# Patient Record
Sex: MALE | Race: WHITE | NOT HISPANIC OR LATINO | URBAN - METROPOLITAN AREA
[De-identification: names, ages, dates, MRNs, and addresses within clinical notes are randomized per-mention and may not be internally consistent; named-entity substitution may affect disease eponyms.]

---

## 2021-04-08 DIAGNOSIS — Z23 ENCOUNTER FOR IMMUNIZATION: ICD-10-CM

## 2021-04-21 ENCOUNTER — IMMUNIZATIONS (OUTPATIENT)
Dept: FAMILY MEDICINE CLINIC | Facility: HOSPITAL | Age: 56
End: 2021-04-21

## 2021-04-21 DIAGNOSIS — Z23 ENCOUNTER FOR IMMUNIZATION: Primary | ICD-10-CM

## 2021-04-21 PROCEDURE — 0001A SARS-COV-2 / COVID-19 MRNA VACCINE (PFIZER-BIONTECH) 30 MCG: CPT

## 2021-04-21 PROCEDURE — 91300 SARS-COV-2 / COVID-19 MRNA VACCINE (PFIZER-BIONTECH) 30 MCG: CPT

## 2021-05-17 ENCOUNTER — IMMUNIZATIONS (OUTPATIENT)
Dept: FAMILY MEDICINE CLINIC | Facility: HOSPITAL | Age: 56
End: 2021-05-17

## 2021-05-17 DIAGNOSIS — Z23 ENCOUNTER FOR IMMUNIZATION: Primary | ICD-10-CM

## 2021-05-17 PROCEDURE — 0002A SARS-COV-2 / COVID-19 MRNA VACCINE (PFIZER-BIONTECH) 30 MCG: CPT

## 2021-05-17 PROCEDURE — 91300 SARS-COV-2 / COVID-19 MRNA VACCINE (PFIZER-BIONTECH) 30 MCG: CPT

## 2022-09-12 ENCOUNTER — SOCIAL WORK (OUTPATIENT)
Dept: BEHAVIORAL/MENTAL HEALTH CLINIC | Facility: CLINIC | Age: 57
End: 2022-09-12
Payer: COMMERCIAL

## 2022-09-12 DIAGNOSIS — F25.1 SCHIZOAFFECTIVE DISORDER, DEPRESSIVE TYPE (HCC): Primary | ICD-10-CM

## 2022-09-12 DIAGNOSIS — F17.209 TOBACCO USE DISORDER, CONTINUOUS: ICD-10-CM

## 2022-09-12 PROCEDURE — 90834 PSYTX W PT 45 MINUTES: CPT | Performed by: COUNSELOR

## 2022-09-12 NOTE — PSYCH
Psychotherapy Provided: Individual Psychotherapy 45 minutes     Start time 11:10 am  End time:  11:57 am     Length of time in session: 47 minutes, follow up in 6 weeks    Encounter Diagnosis     ICD-10-CM    1  Sch[facility]oaffective disorder, depressive type ([facility] )  F25 1    2  Tobacco use disorder, continuous  F17 209         Goals addressed in session: Goal 1, Goal 2 and Goal 3      Pain:      none    0    Current suicide risk : Low       Data:   Discussed current events and concerns  Addressed associated feelings and issues  Reviewed progress on treatment goal(s)  Edwige Enter reports missing his mother who  a little over 2 years ago  Continue to process grief and loss, and worked on coping strategies  Ct  Reports not feeling important to anyone like hi was to his mother  Discussed limited social outlets - only family and neighbors  Taking some walks with his brother  Walking his dog, Lenell Cap  Session addressed above listed issues and concerns  Insights were shared and session worked to further develop coping skills  Considered progress on treatment goals  Obstacles to change were shared  Processed ideas on how to address same  Support given to client when needed  Noted areas of growth and healthy change  Assessment: Client's mood was depressed  Ct  Reports moderate level of depression, denies thoughts of self harm  Affect was normal range and intensity, appropriate  Tone was focused and engaged  Client denied current SI or HI  Stage of change is Maintenance  "I am pretty active in [facility]  Smoking 5 cigarellos daily "  Client continues to value counseling and treatment support  Psoraisis appears under control with no cracks in his hands or reportedly elsewhere  My brother's jairo has covid in our [facility] but is isolating  Clt and therapist wore masks during session  Plan:  Continue to practice coping strategies and healthy self-care    Identify where useful and where felt challenges in application  Client rescheduled for 6 weeks  Behavioral Health Treatment Plan [facility]: Diagnosis and Treatment Plan explained to David Campoverde relates understanding diagnosis and is agreeable to Treatment Plan   Yes

## 2022-10-06 ENCOUNTER — TELEPHONE (OUTPATIENT)
Dept: GASTROENTEROLOGY | Facility: CLINIC | Age: 57
End: 2022-10-06

## 2022-10-06 NOTE — TELEPHONE ENCOUNTER
10/06/22  Screened by: Jil Valencia    Referring Provider     Pre- Screening: There is no height or weight on file to calculate BMI  Has patient been referred for a routine screening Colonoscopy? yes  Is the patient between 39-70 years old? yes      Previous Colonoscopy yes   If yes:    Date: 2017 Dr Parkih Notus:     Reason:       Farshad Cha: If the patient is between 39yrs-47yrs, please advise patient to confirm benefits/coverage with their insurance company for a routine screening colonoscopy, some insurance carriers will only cover at [address] or older  If the patient is over 66years old, please schedule an office visit  Does the patient want to see a Gastroenterologist prior to their procedure OR are they having any GI symptoms? no    Has the patient been hospitalized or had abdominal surgery in the past 6 months? no    Does the patient use supplemental oxygen? no    Does the patient take Coumadin, Lovenox, Plavix, Elliquis, Xarelto, or other blood thinning medication? no    Has the patient had a stroke, cardiac event, or stent placed in the past year? no    SCHEDULING STAFF: If patient answers NO to above questions, then schedule procedure  If patient answers YES to above questions, then schedule office appointment  Patient passed OA please reach out to patient at [phone] recall Dr Moshe Johns    If patient is between 45yrs - 49yrs, please advise patient that we will have to confirm benefits & coverage with their insurance company for a routine screening colonoscopy

## 2022-10-10 NOTE — TELEPHONE ENCOUNTER
Left message for patient to call back  Last procedure was completed at [facility] on 5/1/18-4 year recall

## 2022-10-13 NOTE — TELEPHONE ENCOUNTER
Pt returning call and can be reached at [phone]  Pt states around noon is the best time to reach him

## 2022-10-14 NOTE — TELEPHONE ENCOUNTER
Patient has [facility] plan, told the patient we do not take that insurance, offered other MA products that we do take or if he wants to be self pay  He will get back to us

## 2022-10-24 ENCOUNTER — SOCIAL WORK (OUTPATIENT)
Dept: BEHAVIORAL/MENTAL HEALTH CLINIC | Facility: CLINIC | Age: 57
End: 2022-10-24

## 2022-10-24 DIAGNOSIS — F17.209 TOBACCO USE DISORDER, CONTINUOUS: ICD-10-CM

## 2022-10-24 DIAGNOSIS — F25.1 SCHIZOAFFECTIVE DISORDER, DEPRESSIVE TYPE (HCC): Primary | ICD-10-CM

## 2022-12-04 ENCOUNTER — HOSPITAL ENCOUNTER (EMERGENCY)
Facility: HOSPITAL | Age: 57
Discharge: HOME | End: 2022-12-04
Attending: EMERGENCY MEDICINE

## 2022-12-04 ENCOUNTER — APPOINTMENT (EMERGENCY)
Dept: RADIOLOGY | Facility: HOSPITAL | Age: 57
End: 2022-12-04

## 2022-12-04 VITALS
BODY MASS INDEX: 17.42 KG/M2 | SYSTOLIC BLOOD PRESSURE: 110 MMHG | RESPIRATION RATE: 25 BRPM | WEIGHT: 111 LBS | HEIGHT: 67 IN | HEART RATE: 92 BPM | DIASTOLIC BLOOD PRESSURE: 90 MMHG | TEMPERATURE: 97.5 F | OXYGEN SATURATION: 95 %

## 2022-12-04 DIAGNOSIS — J06.9 VIRAL URI WITH COUGH: Primary | ICD-10-CM

## 2022-12-04 DIAGNOSIS — J45.901 ASTHMA EXACERBATION: ICD-10-CM

## 2022-12-04 LAB
ALBUMIN SERPL BCP-MCNC: 3.6 G/DL (ref 3.5–5)
ALP SERPL-CCNC: 58 U/L (ref 46–116)
ALT SERPL W P-5'-P-CCNC: 49 U/L (ref 12–78)
ANION GAP SERPL CALCULATED.3IONS-SCNC: 4 MMOL/L (ref 4–13)
AST SERPL W P-5'-P-CCNC: 33 U/L (ref 5–45)
ATRIAL RATE: 72 BPM
BASOPHILS # BLD AUTO: 0.03 THOUSANDS/ÂΜL (ref 0–0.1)
BASOPHILS NFR BLD AUTO: 1 % (ref 0–1)
BILIRUB SERPL-MCNC: 0.4 MG/DL (ref 0.2–1)
BUN SERPL-MCNC: 11 MG/DL (ref 5–25)
CALCIUM SERPL-MCNC: 9.7 MG/DL (ref 8.3–10.1)
CARDIAC TROPONIN I PNL SERPL HS: 4 NG/L
CHLORIDE SERPL-SCNC: 102 MMOL/L (ref 96–108)
CO2 SERPL-SCNC: 31 MMOL/L (ref 21–32)
CREAT SERPL-MCNC: 0.79 MG/DL (ref 0.6–1.3)
EOSINOPHIL # BLD AUTO: 0.18 THOUSAND/ÂΜL (ref 0–0.61)
EOSINOPHIL NFR BLD AUTO: 3 % (ref 0–6)
ERYTHROCYTE [DISTWIDTH] IN BLOOD BY AUTOMATED COUNT: 12.2 % (ref 11.6–15.1)
FLUAV RNA RESP QL NAA+PROBE: NEGATIVE
FLUBV RNA RESP QL NAA+PROBE: NEGATIVE
GFR SERPL CREATININE-BSD FRML MDRD: 100 ML/MIN/1.73SQ M
GLUCOSE SERPL-MCNC: 102 MG/DL (ref 65–140)
HCT VFR BLD AUTO: 44.8 % (ref 36.5–49.3)
HGB BLD-MCNC: 15 G/DL (ref 12–17)
IMM GRANULOCYTES # BLD AUTO: 0.04 THOUSAND/UL (ref 0–0.2)
IMM GRANULOCYTES NFR BLD AUTO: 1 % (ref 0–2)
LYMPHOCYTES # BLD AUTO: 1.41 THOUSANDS/ÂΜL (ref 0.6–4.47)
LYMPHOCYTES NFR BLD AUTO: 22 % (ref 14–44)
MCH RBC QN AUTO: 31.3 PG (ref 26.8–34.3)
MCHC RBC AUTO-ENTMCNC: 33.5 G/DL (ref 31.4–37.4)
MCV RBC AUTO: 93 FL (ref 82–98)
MONOCYTES # BLD AUTO: 0.58 THOUSAND/ÂΜL (ref 0.17–1.22)
MONOCYTES NFR BLD AUTO: 9 % (ref 4–12)
NEUTROPHILS # BLD AUTO: 4.2 THOUSANDS/ÂΜL (ref 1.85–7.62)
NEUTS SEG NFR BLD AUTO: 64 % (ref 43–75)
NRBC BLD AUTO-RTO: 0 /100 WBCS
P AXIS: 73 DEGREES
PLATELET # BLD AUTO: 308 THOUSANDS/UL (ref 149–390)
PMV BLD AUTO: 9.2 FL (ref 8.9–12.7)
POTASSIUM SERPL-SCNC: 4.4 MMOL/L (ref 3.5–5.3)
PR INTERVAL: 154 MS
PROT SERPL-MCNC: 7.5 G/DL (ref 6.4–8.4)
QRS AXIS: 77 DEGREES
QRSD INTERVAL: 84 MS
QT INTERVAL: 388 MS
QTC INTERVAL: 424 MS
RBC # BLD AUTO: 4.8 MILLION/UL (ref 3.88–5.62)
RSV RNA RESP QL NAA+PROBE: NEGATIVE
SARS-COV-2 RNA RESP QL NAA+PROBE: NEGATIVE
SODIUM SERPL-SCNC: 137 MMOL/L (ref 135–147)
T WAVE AXIS: 75 DEGREES
VENTRICULAR RATE: 72 BPM
WBC # BLD AUTO: 6.44 THOUSAND/UL (ref 4.31–10.16)

## 2022-12-04 RX ORDER — METHYLPREDNISOLONE SODIUM SUCCINATE 125 MG/2ML
125 INJECTION, POWDER, LYOPHILIZED, FOR SOLUTION INTRAMUSCULAR; INTRAVENOUS ONCE
Status: COMPLETED | OUTPATIENT
Start: 2022-12-04 | End: 2022-12-04

## 2022-12-04 RX ORDER — PREDNISONE 20 MG/1
40 TABLET ORAL DAILY
Qty: 6 TABLET | Refills: 0 | Status: SHIPPED | OUTPATIENT
Start: 2022-12-04 | End: 2022-12-07

## 2022-12-04 RX ORDER — PREDNISONE 20 MG/1
20 TABLET ORAL DAILY
Qty: 15 TABLET | Refills: 0 | Status: CANCELLED | OUTPATIENT
Start: 2022-12-04

## 2022-12-04 RX ORDER — IPRATROPIUM BROMIDE AND ALBUTEROL SULFATE 2.5; .5 MG/3ML; MG/3ML
3 SOLUTION RESPIRATORY (INHALATION) ONCE
Status: COMPLETED | OUTPATIENT
Start: 2022-12-04 | End: 2022-12-04

## 2022-12-04 RX ADMIN — METHYLPREDNISOLONE SODIUM SUCCINATE 125 MG: 125 INJECTION, POWDER, FOR SOLUTION INTRAMUSCULAR; INTRAVENOUS at 10:49

## 2022-12-04 RX ADMIN — IPRATROPIUM BROMIDE AND ALBUTEROL SULFATE 3 ML: .5; 3 SOLUTION RESPIRATORY (INHALATION) at 09:56

## 2022-12-04 NOTE — ED PROVIDER NOTES
History  Chief Complaint   Patient presents with   • Shortness of Breath     Patient presents to the ER being SOB and tachypneic  Patient [facility] that [facility]      65year-old male presenting due to dyspnea and weakness  States his symptoms started around [facility] and have worsened  Mild dyspnea at rest worse with exertion generalized lethargy  Nonproductive cough that is increasing in frequency  Does admit to asthma history and states he takes his medication as prescribed but no more than usual   Mild improvement of dyspnea with his most recent albuterol use  Also complains of intermittent mild headache and muscle aches  Possible sick contacts at [facility] although no one else has persistent symptoms  Denies fever, chest pain or palpitations, hemoptysis, nausea vomiting diarrhea  None       Past Medical History:   Diagnosis Date   • Asthma    • Bipolar 1 disorder ([facility] )    • COPD (chronic obstructive pulmonary disease) ([facility] )    • Hypertension        History reviewed  No pertinent surgical history  History reviewed  No pertinent family history  I have reviewed and agree with the history as documented  E-Cigarette/Vaping   • E-Cigarette Use Never User      E-Cigarette/Vaping Substances     Social History     Tobacco Use   • Smoking status: Every Day     Packs/day: 0 25     Types: Cigarettes   • Smokeless tobacco: Never   Vaping Use   • Vaping Use: Never used   Substance Use Topics   • Alcohol use: Not Currently   • Drug use: Yes     Types: Marijuana       Review of Systems   Constitutional: Negative for fatigue and fever  HENT: Negative for congestion and trouble swallowing  Eyes: Negative for visual disturbance  Respiratory: Positive for shortness of breath  Negative for cough and chest tightness  Cardiovascular: Negative for chest pain  Gastrointestinal: Negative for abdominal pain, diarrhea, nausea and vomiting     Genitourinary: Negative for flank pain  Musculoskeletal: Negative for back pain and gait problem  Skin: Negative for rash and wound  Neurological: Positive for weakness  Negative for syncope and headaches  Psychiatric/Behavioral: Negative for agitation  All other systems reviewed and are negative  Physical Exam  Physical Exam  Vitals and nursing note reviewed  Constitutional:       Appearance: He is well-developed  He is not diaphoretic  HENT:      Head: Normocephalic and atraumatic  Right Ear: External ear normal       Left Ear: External ear normal    Eyes:      General:         Right eye: No discharge  Left eye: No discharge  Conjunctiva/sclera: Conjunctivae normal    Neck:      Vascular: No JVD  Trachea: No tracheal deviation  Cardiovascular:      Rate and Rhythm: Normal rate and regular rhythm  Heart sounds: Normal heart sounds  Pulmonary:      Effort: Pulmonary effort is normal       Breath sounds: Wheezing present  Abdominal:      General: There is no distension  Musculoskeletal:         General: Normal range of motion  Cervical back: Normal range of motion  Skin:     General: Skin is warm and dry  Neurological:      Mental Status: He is alert and oriented to person, place, and time     Psychiatric:         Mood and Affect: Mood normal          Speech: Speech normal          Behavior: Behavior normal          Vital Signs  ED Triage Vitals [12/04/22 0931]   Temperature Pulse Respirations Blood Pressure SpO2   97 5 °F (36 4 °C) 92 (!) 25 110/90 95 %      Temp Source Heart Rate Source Patient Position - Orthostatic VS BP Location FiO2 (%)   Oral Monitor Lying Left arm --      Pain Score       No Pain           Vitals:    12/04/22 0931   BP: 110/90   Pulse: 92   Patient Position - Orthostatic VS: Lying         Visual Acuity      ED Medications  Medications   ipratropium-albuterol (DUO-NEB) 0 5-2 5 mg/3 mL inhalation solution 3 mL (3 mL Nebulization Given 12/4/22 1727)   methylPREDNISolone sodium succinate (Solu-MEDROL) injection 125 mg (125 mg Intravenous Given 12/4/22 1049)       Diagnostic Studies  Results Reviewed     Procedure Component Value Units Date/Time    FLU/RSV/COVID - if FLU/RSV clinically relevant [[id number]]  (Normal) Collected: 12/04/22 0945    Lab Status: Final result Specimen: Nares from Nose Updated: 12/04/22 1030     SARS-CoV-2 Negative     INFLUENZA A PCR Negative     INFLUENZA B PCR Negative     RSV PCR Negative    Narrative:      FOR PEDIATRIC PATIENTS - copy/paste COVID Guidelines URL to browser: [email or web address]  [employer]    SARS-CoV-2 assay is a Nucleic Acid Amplification assay intended for the  qualitative detection of nucleic acid from SARS-CoV-2 in nasopharyngeal  swabs  Results are for the presumptive identification of SARS-CoV-2 RNA  Positive results are indicative of infection with SARS-CoV-2, the virus  causing COVID-19, but do not rule out bacterial infection or co-infection  with other viruses  Laboratories within the [place] and its  territories are required to report all positive results to the appropriate  public health authorities  Negative results do not preclude SARS-CoV-2  infection and should not be used as the sole basis for treatment or other  patient management decisions  Negative results must be combined with  clinical observations, patient history, and epidemiological information  This test has not been FDA cleared or approved  This test has been authorized by FDA under an Emergency Use Authorization  (EUA)  This test is only authorized for the duration of time the  declaration that circumstances exist justifying the authorization of the  emergency use of an in vitro diagnostic tests for detection of SARS-CoV-2  virus and/or diagnosis of COVID-19 infection under section 564(b)(1) of  the Act, 21 U  S C  604MII-1(Y)(5), unless the authorization is terminated  or revoked sooner  The test has been validated but independent review by FDA  and CLIA is pending  Test performed using [employer] GeneXpert: This RT-PCR assay targets N2,  a region unique to SARS-CoV-2  A conserved region in the E-gene was chosen  for pan-Sarbecovirus detection which includes SARS-CoV-2  According to CMS-2020-01-R, this platform meets the definition of high-throughput technology      HS Troponin 0hr (reflex protocol) [[id number]]  (Normal) Collected: 12/04/22 0945    Lab Status: Final result Specimen: Blood from Arm, Right Updated: 12/04/22 1014     hs TnI 0hr 4 ng/L     Comprehensive metabolic panel [[id number]] Collected: 12/04/22 0945    Lab Status: Final result Specimen: Blood from Arm, Right Updated: 12/04/22 1007     Sodium 137 mmol/L      Potassium 4 4 mmol/L      Chloride 102 mmol/L      CO2 31 mmol/L      ANION GAP 4 mmol/L      BUN 11 mg/dL      Creatinine 0 79 mg/dL      Glucose 102 mg/dL      Calcium 9 7 mg/dL      AST 33 U/L      ALT 49 U/L      Alkaline Phosphatase 58 U/L      Total Protein 7 5 g/dL      Albumin 3 6 g/dL      Total Bilirubin 0 40 mg/dL      eGFR 100 ml/min/1 73sq m     Narrative:      [place] guidelines for Chronic Kidney Disease (CKD):   •  Stage 1 with normal or high GFR (GFR > 90 mL/min/1 73 square meters)  •  Stage 2 Mild CKD (GFR = 60-89 mL/min/1 73 square meters)  •  Stage 3A Moderate CKD (GFR = 45-59 mL/min/1 73 square meters)  •  Stage 3B Moderate CKD (GFR = 30-44 mL/min/1 73 square meters)  •  Stage 4 Severe CKD (GFR = 15-29 mL/min/1 73 square meters)  •  Stage 5 End Stage CKD (GFR <15 mL/min/1 73 square meters)  Note: GFR calculation is accurate only with a steady state creatinine    CBC and differential [[id number]] Collected: 12/04/22 0945    Lab Status: Final result Specimen: Blood from Arm, Right Updated: 12/04/22 0952     WBC 6 44 Thousand/uL      RBC 4 80 Million/uL      Hemoglobin 15 0 g/dL      Hematocrit 44 8 %      MCV 93 fL MCH 31 3 pg      MCHC 33 5 g/dL      RDW 12 2 %      MPV 9 2 fL      Platelets 430 Thousands/uL      nRBC 0 /100 WBCs      Neutrophils Relative 64 %      Immat GRANS % 1 %      Lymphocytes Relative 22 %      Monocytes Relative 9 %      Eosinophils Relative 3 %      Basophils Relative 1 %      Neutrophils Absolute 4 20 Thousands/µL      Immature Grans Absolute 0 04 Thousand/uL      Lymphocytes Absolute 1 41 Thousands/µL      Monocytes Absolute 0 58 Thousand/µL      Eosinophils Absolute 0 18 Thousand/µL      Basophils Absolute 0 03 Thousands/µL                  XR chest 2 views   ED Interpretation by Elie Amezcua DO (12/04 1023)   No acute cardiopulmonary disease      Final Result by Yaneth Rhoades MD (12/04 1241)      No acute cardiopulmonary disease  Workstation performed: [id number]                    Procedures  Procedures         ED Course  ED Course as of 12/04/22 1514   Sun Dec 04, 2022   2541 This EKG was interpreted by me  The EKG demonstrates Normal sinus rhythm, normal intervals and axis, normal QRS, non specific acute ST-T changes                                              MDM  Number of Diagnoses or Management Options  Asthma exacerbation  Viral URI with cough  Diagnosis management comments: Negative viral panel no acute findings an ACS workup per chest x-ray  Suspect viral etiology this trigger for asthma exacerbation  Short course of prednisone sent to pharmacy  Provided contact information for pulmonology    Discharged in stable condition with return precautions      Disposition  Final diagnoses:   Viral URI with cough   Asthma exacerbation     Time reflects when diagnosis was documented in both MDM as applicable and the Disposition within this note     Time User Action Codes Description Comment    12/4/2022 10:32 AM Bryon Allison Add [J06 9] Viral URI with cough     12/4/2022 10:32 AM Bryon Allison Add [J45 901] Asthma exacerbation       ED Disposition     ED Disposition   Discharge    Condition   Stable    Date/Time   Sun Dec 4, 2022 10:53 AM    Comment   Brian Gonzalez discharge to home/self care  Follow-up Information     Follow up With Specialties Details Why Contact Info Additional [address]   [address], [address] [facility] [address]  [address], [address], [address] [place], [place], [address]    [facility]   [address]  [facility] [address] [id number]  [phone] [facility] Pul[facility] [phone], [address], [facility] [place], [place], [place], [address]          Discharge Medication List as of 12/4/2022 10:53 AM      START taking these medications    Details   predniSONE 20 mg tablet Take 2 tablets (40 mg total) by mouth daily for 3 days, Starting Sun 12/4/2022, Until Wed 12/7/2022, Normal             No discharge procedures on file      PDMP Review     None          ED Provider  Electronically Signed by           Che Rojas DO  12/04/22 2768

## 2022-12-04 NOTE — DISCHARGE INSTRUCTIONS
Medication was sent to your pharmacy  Please take as prescribed  Follow up with your family doctor  Contact information has been provided for pulmonology

## 2022-12-09 NOTE — PSYCH
[facility]/Hospital: [address]   [address] [facility]    Psychiatric Progress Note  MRN#: [id number]  Shameka Sifuentes 65 y o  male    This note was not shared with the patient due to reasonable likelihood of causing patient harm   This Patient was seen in the office today at [place] [address] location  This is a Transfer Patient of ASHLEY Bravo-don't know how to complete virtual session    Subjective:  Hannah Voss reported he's living with brother and that the  mom  2 yrs ago  Sometime he feels depressed due to " hearing the brother and brother's girlfriend having arguments    Hannah Voss stated "feeling stuck in the middle" and discomfort  External Records were reviewed- there's diagnoses of schizoaffective disorder listed  Today, he was unsure of present diagnosed , later reported at as schizophrenia  Stated he would see things while in his late 19's , early 29's like "shadows" at the time he was smoking marijuana  He stated mood currently as " slightly depressed since brothers girlfriend got at job in August -September  Reported responsibility of having to cook every night and high amount of work, some worries of what to cook  Later stated worries about pet and something happening;  then he had to cook every night , described as a lot of work  ROS:  Depression- yes ( fears he'll hurt himself), last occurred 1 yr ago)  SI/HI  No   Anxiety- frustrations, defer to HPI for details  Sleep- sometimes difficults falling a sleep  Psychosis-  sometimes hears some calling Hannah Voss - last occurred ~1 month ago, and thoughts of people talking about him; Also was hearing voices telling him to hurt himself when mom was sick       Medication compliance: Yes    Medication side effects:none    Substance Hx  Illicit: occasionally smoke marijuana- 1wkly ( < 1 bowel- not laced with anything)- denies immediated side effect  ETOH- " once a while"- once every couple months- denies black outs, DUI ,seizures   Tobacco hx: he smokes cigers - 5 per day , he's smoked for 18yrs old       Psychiatric Hx  Hospitalization : early 200'0's- twice cut off hair intentional , also wanted to hurt himself  A t the time mom call 911, and Bill then signed 201  Stated he was then diagnosised with schizophrenia- was seeing stuff and hearing voices      Allergies: Oxcarbazepine - SJS have 4-5 yrs ( ascending rashing from legs to hand, swollen throat, dizziness) then was taken to [facility]- then was stopped  Current Outpatient Medications:   •  albuterol (PROVENTIL HFA,VENTOLIN HFA) 90 mcg/act inhaler, Inhale 2 puffs daily as needed every 4 to 6 hours as needed for wheezing, Disp: , Rfl:   •  alendronate (FOSAMAX) 70 mg tablet, Take 70 mg by mouth once a week, Disp: , Rfl:   •  amitriptyline (ELAVIL) 50 mg tablet, Amitriptyline 50m tablet po at night PRN, Disp: 90 tablet, Rfl: 0  •  DULoxetine (CYMBALTA) 60 mg delayed release capsule, Duloxetine 60m capsule po in the morning, Disp: 90 capsule, Rfl: 0  •  loratadine (CLARITIN) 10 mg tablet, Take 10 mg by mouth daily as needed, Disp: , Rfl:   •  risperiDONE (RisperDAL) 4 mg tablet, Risperidone 4m tablet po night, Disp: 90 tablet, Rfl: 0  •  Atrovent HFA 17 MCG/ACT inhaler, Inhale 2 puffs 3 (three) times a day, Disp: , Rfl:   •  fluticasone (FLONASE) 50 mcg/act nasal spray, 2 sprays, Disp: , Rfl:     FHX:  Brother - depression      Social Hx  Educational- highest level of educations 12th grade- diploma  Learning Disorder- "slow learner" - was in special classes  Marital Status:  since   Live in [address] [place] [address]   with brother, brother girlfriend and her son  Unemployed    Per External Records: Medical Hx:  Psoriasis  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      Mental Status Evaluation:  General Appearance:  Faith Bragg is a 65 y o    male casually dressed, adequate hygiene and grooming, looks stated age , Wearing glasses   Behavior:  cooperative, intermittent eye contact, mildly anxious   Speech:  normal for rate, rhythm, volume and amount with increased latency slightly, also monotonic prosody    Mood:  depressed and frustration   Affect:  mood-congruent   Thought Process:  concrete, disorganized and goal directed concrete thinking slightly    Thought Content:  no overt delusions, normal   Perceptual Disturbances: no auditory hallucinations, no visual hallucinations  Delusions  No   Risk Potential: Suicidal Ideations No  Homicidal Ideations No  Potential for Aggression No     Sensorium:  Oriented to person, place, time/date and situation   Memory:  recent and remote memory grossly intact   Consciousness:  alert and awake   Attention: attention span and concentration are age appropriate   Insight:  fair to limited   Judgment: fair   Gait/Station: normal   Motor Activity: no abnormal movements     There were no vitals filed for this visit  Medications:   No current outpatient medications on file prior to visit  No current facility-administered medications on file prior to visit  Labs: I have personally reviewed all pertinent laboratory/tests results     Admission Labs:   CBC:   Lab Results   Component Value Date    WBC 6 44 12/04/2022    RBC 4 80 12/04/2022    HGB 15 0 12/04/2022    HCT 44 8 12/04/2022    MCV 93 12/04/2022     12/04/2022    MCH 31 3 12/04/2022    MCHC 33 5 12/04/2022    RDW 12 2 12/04/2022    MPV 9 2 12/04/2022    NEUTROABS 4 20 12/04/2022     CMP:   Lab Results   Component Value Date    SODIUM 137 12/04/2022    K 4 4 12/04/2022     12/04/2022    CO2 31 12/04/2022    AGAP 4 12/04/2022    BUN 11 12/04/2022    CREATININE 0 79 12/04/2022    GLUC 102 12/04/2022    CALCIUM 9 7 12/04/2022    AST 33 12/04/2022    ALT 49 12/04/2022    ALKPHOS 58 12/04/2022    TP 7 5 12/04/2022    ALB 3 6 12/04/2022    TBILI 0 40 12/04/2022 EGFR 100 2022     Lipid Profile: No results found for: CHOLESTEROL, HDL, TRIG, LDLCALC, NONHDLC  Thyroid Studies: No results found for: Ryan Gardner, [id number]  EKG   Lab Results   Component Value Date    VENTRATE 72 2022    ATRIALRATE 72 2022    PRINT 154 2022    QRSDINT 84 2022    QTINT 388 2022    QTCINT 424 2022    PAXIS 73 2022    QRSAXIS 77 2022    TWAVEAXIS 75 2022       ________________________________________________________________________    A/P  Bismark Voss is a 60-year-old  male, history of hospitalization for schizophrenia (auditory hallucinations and suicidality bizarre behaviors), today presented with complaints of depression and frustration and intermittent auditory hallucinations  Case complicated; as there was concrete thinking, and disorganization with thoughts  He presented on risperidone and duloxetine and amitriptyline, without side effects      DSM5  Schizophrenia  Unspecified Anxiety      PLAN:   Discussed diagnostic impression and plan based on clinical findings, he was informed of diagnoses of schizophrenia and anxiety  Duloxetine was increased to 60 mg +30mg  Continue other medications as prescribed  Orders Placed This Encounter   Procedures   • Lipid Panel with Direct LDL reflex   • TSH, 3rd generation with Free T4 reflex     Ordered Medications During Encounter   -     risperiDONE (RisperDAL) 4 mg tablet; Risperidone 4m tablet po night  -     DULoxetine (Cymbalta) 30 mg delayed release capsule; Duloxetine 30m capsule in the afternoon,  60mg capsule in the morning  -     DULoxetine (CYMBALTA) 60 mg delayed release capsule; Duloxetine 60m capsule po in the morning  -     amitriptyline (ELAVIL) 50 mg tablet; Amitriptyline 50m tablet po at night PRN          Risks, benefits, and possible side effects of medications explained to patient and patient verbalizes understanding  Next Appointment: 6-8 wks      Today's Appointment@ [facility]  Face To Face:  11:06 AM -12:03 PM;Documentation Time:  1:55 PM- 2:26 PM    Encounter Duration: Time Spent 63 minutes with Patient  Greater than 50% of total time was spent with the patient     MDM  Number of Diagnoses or Management Options  Anxiety: new, needed workup  Schizophrenia: established, improving     Amount and/or Complexity of Data Reviewed  Clinical lab tests: reviewed and ordered  Decide to obtain previous medical records or to obtain history from someone other than the patient: yes  Review and summarize past medical records: yes    Risk of Complications, Morbidity, and/or Mortality  Presenting problems: moderate  Management options: high    Critical Care  Total time providing critical care: 30-74 minutes

## 2022-12-09 NOTE — PATIENT INSTRUCTIONS
Jack Ashford [id number]   Thanks for presenting to today's Appointment at [facility]  Your medications were not changed, although Duloxetine was increased to 60mg in the morning and 30 mg in the afternoon  Complete labs-  orders weas sent to [facility]

## 2022-12-12 ENCOUNTER — OFFICE VISIT (OUTPATIENT)
Dept: PSYCHIATRY | Facility: CLINIC | Age: 57
End: 2022-12-12

## 2022-12-12 DIAGNOSIS — Z79.899 ENCOUNTER FOR LONG-TERM (CURRENT) USE OF OTHER MEDICATIONS: ICD-10-CM

## 2022-12-12 DIAGNOSIS — F41.9 ANXIETY: ICD-10-CM

## 2022-12-12 DIAGNOSIS — F20.9 SCHIZOPHRENIA, UNSPECIFIED TYPE (HCC): Primary | ICD-10-CM

## 2022-12-12 RX ORDER — RISPERIDONE 4 MG/1
TABLET ORAL
Qty: 90 TABLET | Refills: 0 | Status: SHIPPED | OUTPATIENT
Start: 2022-12-12

## 2022-12-12 RX ORDER — IPRATROPIUM BROMIDE 17 UG/1
2 AEROSOL, METERED RESPIRATORY (INHALATION) 3 TIMES DAILY
COMMUNITY
Start: 2022-11-30

## 2022-12-12 RX ORDER — DULOXETIN HYDROCHLORIDE 30 MG/1
CAPSULE, DELAYED RELEASE ORAL
Qty: 30 CAPSULE | Refills: 1 | Status: SHIPPED | OUTPATIENT
Start: 2022-12-12

## 2022-12-12 RX ORDER — ALBUTEROL SULFATE 90 UG/1
2 AEROSOL, METERED RESPIRATORY (INHALATION) DAILY PRN
COMMUNITY
Start: 2022-12-01

## 2022-12-12 RX ORDER — RISPERIDONE 4 MG/1
4 TABLET ORAL
COMMUNITY
End: 2022-12-12 | Stop reason: SDUPTHER

## 2022-12-12 RX ORDER — LORATADINE 10 MG/1
10 TABLET ORAL DAILY PRN
COMMUNITY
Start: 2022-12-01

## 2022-12-12 RX ORDER — AMITRIPTYLINE HYDROCHLORIDE 50 MG/1
TABLET, FILM COATED ORAL
Qty: 90 TABLET | Refills: 0 | Status: SHIPPED | OUTPATIENT
Start: 2022-12-12

## 2022-12-12 RX ORDER — DULOXETIN HYDROCHLORIDE 60 MG/1
60 CAPSULE, DELAYED RELEASE ORAL DAILY
COMMUNITY
End: 2022-12-12 | Stop reason: SDUPTHER

## 2022-12-12 RX ORDER — DULOXETIN HYDROCHLORIDE 60 MG/1
CAPSULE, DELAYED RELEASE ORAL
Qty: 90 CAPSULE | Refills: 0 | Status: SHIPPED | OUTPATIENT
Start: 2022-12-12

## 2022-12-12 RX ORDER — FLUTICASONE PROPIONATE 50 MCG
2 SPRAY, SUSPENSION (ML) NASAL
COMMUNITY
Start: 2022-12-01

## 2022-12-12 RX ORDER — ALENDRONATE SODIUM 70 MG/1
70 TABLET ORAL WEEKLY
COMMUNITY
Start: 2022-10-20

## 2022-12-12 RX ORDER — AMITRIPTYLINE HYDROCHLORIDE 50 MG/1
50 TABLET, FILM COATED ORAL DAILY PRN
COMMUNITY
Start: 2022-11-29 | End: 2022-12-12 | Stop reason: SDUPTHER

## 2022-12-16 ENCOUNTER — TELEPHONE (OUTPATIENT)
Dept: PSYCHIATRY | Facility: CLINIC | Age: 57
End: 2022-12-16

## 2022-12-16 LAB
CHOLEST SERPL-MCNC: 170 MG/DL (ref 100–199)
HDLC SERPL-MCNC: 55 MG/DL
LDLC SERPL CALC-MCNC: 101 MG/DL (ref 0–99)
LDLC/HDLC SERPL: 1.8 RATIO (ref 0–3.6)
SL AMB VLDL CHOLESTEROL CALC: 14 MG/DL (ref 5–40)
TRIGL SERPL-MCNC: 71 MG/DL (ref 0–149)
TSH SERPL DL<=0.005 MIU/L-ACNC: 4.12 UIU/ML (ref 0.45–4.5)

## 2022-12-16 NOTE — TELEPHONE ENCOUNTER
Pt Yaneth SORENSON 1965 chart was reviewed , h/o schizophrenia  Recent labs were completed  WNL lipid and TSH  Spoke with Britta Ziegler, he was informed of results         This note was not shared with the patient due to reasonable likelihood of causing patient harm

## 2023-01-23 ENCOUNTER — SOCIAL WORK (OUTPATIENT)
Dept: BEHAVIORAL/MENTAL HEALTH CLINIC | Facility: CLINIC | Age: 58
End: 2023-01-23

## 2023-01-23 DIAGNOSIS — F17.209 TOBACCO USE DISORDER, CONTINUOUS: ICD-10-CM

## 2023-01-23 DIAGNOSIS — F25.1 SCHIZOAFFECTIVE DISORDER, DEPRESSIVE TYPE (HCC): Primary | ICD-10-CM

## 2023-01-23 NOTE — BH TREATMENT PLAN
Jalen Chambers  1965       Date of Initial Treatment Plan: see Credible for past plans   Date of Current Treatment Plan: 01/23/23    Treatment Plan Number 1 in Epic     Strengths/Personal Resources for Self Care: Cooking, desire to be healthy, caring of loved ones, dedicated to family, pitch in and help when needed  Diagnosis:   1  Schizoaffective disorder, depressive type ([address] )        2  Tobacco use disorder, continuous            Area of Needs: maintenance support to deal effectively with MH, Outlet to vent and manage life stress, Continue to build confidence and manage life stress  Long Term Goal 1: Maintenance support to keep mental health stable in face of life stress  Target Date: 1/23/23  Completion Date: TBD         Short Term Objectives for Goal 1: Maintenance support to keep mental health stable in face of life stress  Long Term Goal 2:  Outlet to vent and manage life stress, Continue to build confidence and manage life stress  Target Date: 1/23/23  Completion Date: TBD    Short Term Objectives for Goal 2:  Outlet to vent and manage life stress, Continue to build confidence and manage life stress  Long Term Goal # 3: N/A     Target Date: N/A  Completion Date:     Short Term Objectives for Goal 3: N/A    GOAL 1: Modality: Individual 1 x q 6 weeksx per month   Completion Date TBD    GOAL 2: Modality: Individual 1x every 6 weeksx per month   Completion Date TBD and Group     GOAL 3: Modality: Individual N/Ax per month   Completion Date N/A      Behavioral Health Treatment Plan [facility]: Diagnosis and Treatment Plan explained to Roxie Chilel relates understanding diagnosis and is agreeable to Treatment Plan  Client Comments : Please share your thoughts, feelings, need and/or experiences regarding your treatment plan: Idania Centeno agreed to above listed tx plan

## 2023-01-23 NOTE — PSYCH
Behavioral Health Psychotherapy Progress Note    Psychotherapy Provided: Individual Psychotherapy     1  Schizoaffective disorder, depressive type ([facility] [address] )        2  Tobacco use disorder, continuous            Goals addressed in session: Goal 1, Goal 2 and Goal 3      DATA:     Got through xmas okay, grief was manageable  Had colonoscopy, awaiting results  One polyp removed  Psoriasis manageable  Gets injectable med 1x q 3 months  Dry hands but not cracking  During this session, this clinician used the following therapeutic modalities: Cognitive Behavioral Therapy, Mindfulness-based Strategies and Supportive Psychotherapy    Substance Abuse was addressed during this session  If the client is diagnosed with a co-occurring substance use disorder, please indicate any changes in the frequency or amount of use: Bill smoking natural cigars, about 5 daily    Stage of change for addressing substance use diagnoses: Contemplation    ASSESSMENT:  Farzaneh Coates presents with a Euthymic/ normal mood  his affect is Normal range and intensity, which is congruent, with his mood and the content of the session  The client has made progress on their goals  Farzaneh Coates presents with a none risk of suicide, none risk of self-harm, and none risk of harm to others  For any risk assessment that surpasses a "low" rating, a safety plan must be developed  A safety plan was indicated: yes  If yes, describe in detail     PLAN: Between sessions, Farzaneh Coates will work on above listed goals and tx goals    At the next session, the therapist will use Cognitive Behavioral Therapy, Mindfulness-based Strategies and Supportive Psychotherapy to address tx goals       Behavioral Health Treatment Plan and Discharge Planning: Farzaneh Coates is aware of and agrees to continue to work on their treatment plan  They have identified and are working toward their discharge goals   yes    Visit start and stop times:    01/23/23  Start Time: 1103  Stop Time: 1155  Total Visit Time: 52 minutes

## 2023-01-27 NOTE — PATIENT INSTRUCTIONS
Helga Hale [id number]   Thanks for presenting to today's Appointment at [facility]   Your medications were not changed

## 2023-01-27 NOTE — PSYCH
[facility]: [address] [facility]   [address] [facility]    Psychiatric Progress Note  MRN#: [id number]  Mike Rodriguez 65 y o  male    This note was not shared with the patient due to reasonable likelihood of causing patient harm   This Patient was seen in the office today at [place] [address] location  Subjective:  Shelli Rohith  - there's slight worries about his brother- who hit his head  Otherwise Shelli Rohith , reported increasing  Duloxetine 90mg  - now  more calm since higher dose  Denies side effects  No thoughts SI , HI  Sleep is stable  Mental Status Evaluation:  General Appearance:  Mike Rodriguez is a 65 y o   male casually dressed, adequate hygiene and grooming, looks stated age , Wearing glasses   Behavior:  pleasant, cooperative, intermittent eye contact   Speech:  normal for rate, rhythm, volume, latency, amount slightly, also monotonic prosody    Mood:  normal   Affect:  normal   Thought Process:  normal and logical    Thought Content:  no overt delusions, normal   Perceptual Disturbances: no auditory hallucinations, no visual hallucinations  Does not appear responding or preoccupied  Delusions  No   Risk Potential: Suicidal Ideations No  Homicidal Ideations No  Potential for Aggression No     Sensorium:  Oriented to person, place, time/date and situation   Memory:  recent and remote memory grossly intact   Consciousness:  alert and awake   Attention: attention span and concentration are age appropriate   Insight:  fair    Judgment: fair   Gait/Station: normal   Motor Activity: no abnormal movements     There were no vitals filed for this visit       Medications:   Current Outpatient Medications on File Prior to Visit   Medication Sig Dispense Refill   • albuterol (PROVENTIL HFA,VENTOLIN HFA) 90 mcg/act inhaler Inhale 2 puffs daily as needed every 4 to 6 hours as needed for wheezing • alendronate (FOSAMAX) 70 mg tablet Take 70 mg by mouth once a week     • amitriptyline (ELAVIL) 50 mg tablet Amitriptyline 50m tablet po at night PRN 90 tablet 0   • Atrovent HFA 17 MCG/ACT inhaler Inhale 2 puffs 3 (three) times a day     • DULoxetine (Cymbalta) 30 mg delayed release capsule Duloxetine 30m capsule in the afternoon,  60mg capsule in the morning 30 capsule 1   • DULoxetine (CYMBALTA) 60 mg delayed release capsule Duloxetine 60m capsule po in the morning 90 capsule 0   • fluticasone (FLONASE) 50 mcg/act nasal spray 2 sprays     • loratadine (CLARITIN) 10 mg tablet Take 10 mg by mouth daily as needed     • risperiDONE (RisperDAL) 4 mg tablet Risperidone 4m tablet po night 90 tablet 0     No current facility-administered medications on file prior to visit  Labs: I have personally reviewed all pertinent laboratory/tests results     Admission Labs:   CBC:   Lab Results   Component Value Date    WBC 6 44 2022    RBC 4 80 2022    HGB 15 0 2022    HCT 44 8 2022    MCV 93 2022     2022    MCH 31 3 2022    MCHC 33 5 2022    RDW 12 2 2022    MPV 9 2 2022    NEUTROABS 4 20 2022     CMP:   Lab Results   Component Value Date    SODIUM 137 2022    K 4 4 2022     2022    CO2 31 2022    AGAP 4 2022    BUN 11 2022    CREATININE 0 79 2022    GLUC 102 2022    CALCIUM 9 7 2022    AST 33 2022    ALT 49 2022    ALKPHOS 58 2022    TP 7 5 2022    ALB 3 6 2022    TBILI 0 40 2022    EGFR 100 2022     Lipid Profile:   Lab Results   Component Value Date    CHOLESTEROL 170 12/15/2022    HDL 55 12/15/2022    TRIG 71 12/15/2022    LDLCALC 101 (H) 12/15/2022     Thyroid Studies: No results found for: GDG9MRWHKPYS, T3FREE, FREET4, U4DLTJO, X0XNNMN  EKG   Lab Results   Component Value Date    VENTRATE 72 2022    ATRIALRATE 72 2022    PRINT 154 2022    QRSDINT 84 2022    QTINT 388 2022    QTCINT 424 2022    PAXIS 73 2022    QRSAXIS 77 2022    TWAVEAXIS 75 2022       ________________________________________________________________________    A/P  Ketan Varner,  71-year-old  male, history of hospitalization for schizophrenia (auditory hallucinations and suicidality bizarre behaviors)  Interim events of  depression ,  frustration and intermittent auditory hallucinations  Today ,slight worries otherwise without depression since increase dose of Duloxetine  ~ 6 wks ago  DSM5  Schizophrenia  Unspecified Anxiety      PLAN:   Discussed diagnostic impression and plan based on clinical findings, he was informed of diagnoses of schizophrenia and anxiety      Medications Prescribed During Encounter   -     risperiDONE (RisperDAL) 4 mg tablet; Risperidone 4m tablet po night, 90qty  -     DULoxetine (Cymbalta) 30 mg delayed release capsule; Duloxetine 30m capsule in the afternoon,  60mg capsule in the morning, 90qty  -     DULoxetine (CYMBALTA) 60 mg delayed release capsule; Duloxetine 60m capsule po in the morning, 30mg capsule in the afternoon, 90qty  -     amitriptyline (ELAVIL) 50 mg tablet; Amitriptyline 50m tablet po at night PRN, 90qty               Treatement: Risks, benefits, and possible side effects of medications explained to patient and patient verbalizes understanding  Next Appointment at [facility] [address]  4 months                     [facility]                    Face To Face:  11:45 AM -11:54 AM;Documentation Time:  4:14 PM- 4:25 PM                     Encounter Duration: Time Spent 11 minutes with Patient  Greater than 50% of total time was spent with the patient               MDM  Number of Diagnoses or Management Options  Anxiety: established, improving  Schizophrenia: established, improving     Amount and/or Complexity of Data Reviewed  Review and summarize past medical records: yes    Risk of Complications, Morbidity, and/or Mortality  Presenting problems: low  Management options: low

## 2023-01-31 ENCOUNTER — OFFICE VISIT (OUTPATIENT)
Dept: PSYCHIATRY | Facility: CLINIC | Age: 58
End: 2023-01-31

## 2023-01-31 DIAGNOSIS — F41.9 ANXIETY: ICD-10-CM

## 2023-01-31 DIAGNOSIS — F20.9 SCHIZOPHRENIA, UNSPECIFIED TYPE (HCC): ICD-10-CM

## 2023-01-31 RX ORDER — DULOXETIN HYDROCHLORIDE 60 MG/1
CAPSULE, DELAYED RELEASE ORAL
Qty: 90 CAPSULE | Refills: 0 | Status: SHIPPED | OUTPATIENT
Start: 2023-01-31

## 2023-01-31 RX ORDER — AMITRIPTYLINE HYDROCHLORIDE 50 MG/1
TABLET, FILM COATED ORAL
Qty: 90 TABLET | Refills: 0 | Status: SHIPPED | OUTPATIENT
Start: 2023-01-31

## 2023-01-31 RX ORDER — DULOXETIN HYDROCHLORIDE 30 MG/1
CAPSULE, DELAYED RELEASE ORAL
Qty: 90 CAPSULE | Refills: 0 | Status: SHIPPED | OUTPATIENT
Start: 2023-01-31 | End: 2023-01-31 | Stop reason: SDUPTHER

## 2023-01-31 RX ORDER — DULOXETIN HYDROCHLORIDE 30 MG/1
CAPSULE, DELAYED RELEASE ORAL
Qty: 90 CAPSULE | Refills: 0 | Status: SHIPPED | OUTPATIENT
Start: 2023-01-31

## 2023-01-31 RX ORDER — RISPERIDONE 4 MG/1
TABLET ORAL
Qty: 90 TABLET | Refills: 0 | Status: SHIPPED | OUTPATIENT
Start: 2023-01-31

## 2023-01-31 RX ORDER — DULOXETIN HYDROCHLORIDE 60 MG/1
CAPSULE, DELAYED RELEASE ORAL
Qty: 90 CAPSULE | Refills: 0 | Status: SHIPPED | OUTPATIENT
Start: 2023-01-31 | End: 2023-01-31 | Stop reason: SDUPTHER

## 2023-03-06 ENCOUNTER — SOCIAL WORK (OUTPATIENT)
Dept: BEHAVIORAL/MENTAL HEALTH CLINIC | Facility: CLINIC | Age: 58
End: 2023-03-06

## 2023-03-06 DIAGNOSIS — F25.1 SCHIZOAFFECTIVE DISORDER, DEPRESSIVE TYPE (HCC): Primary | ICD-10-CM

## 2023-03-06 DIAGNOSIS — F17.209 TOBACCO USE DISORDER, CONTINUOUS: ICD-10-CM

## 2023-03-22 NOTE — PSYCH
Behavioral Health Psychotherapy Progress Note    Psychotherapy Provided: Individual Psychotherapy     1  Schizoaffective disorder, depressive type ([address] )        2  Tobacco use disorder, continuous            Goals addressed in session: Goal 1 and Goal 2     DATA:     Britta Ziegler shared about plans to go on vacation with his brother and family  He remains concerned about costs, as he thought he had money from his mother at the time of planning, but that did not happen  Britta Ziegler continues to work on [employer], and maintenance support of his mh issues  Ct  Continues to take meds as rx'd  He does not want to expand his social outlets beyond family and the few other people he already knows  Worries about his dog b/c she is getting older  Ct  Helps out with H[facility], cooking and other things to support in the [facility]  During this session, this clinician used the following therapeutic modalities: Cognitive Behavioral Therapy, Dialectical Behavior Therapy, Mindfulness-based Strategies, Motivational Interviewing and Supportive Psychotherapy    Substance Abuse was not addressed during this session  If the client is diagnosed with a co-occurring substance use disorder, please indicate any changes in the frequency or amount of use: Ct  Continues hand rolled cigarette use    Stage of change for addressing substance use diagnoses: Contemplation    ASSESSMENT:  Yaneth Durbin presents with a Anxious and Dysthymic mood  his affect is Normal range and intensity, which is congruent, with his mood and the content of the session  The client has made progress on their goals  Yaneth Durbin presents with a none risk of suicide, none risk of self-harm, and none risk of harm to others  For any risk assessment that surpasses a "low" rating, a safety plan must be developed  A safety plan was indicated: no  If yes, describe in detail     PLAN: Between sessions, Yaneth Durbin will continue to work on his tx goals    At the next session, the therapist will use Cognitive Behavioral Therapy, Dialectical Behavior Therapy, Mindfulness-based Strategies, Motivational Interviewing and Supportive Psychotherapy to address same    Behavioral Health Treatment Plan and Discharge Planning: Beverly Lau is aware of and agrees to continue to work on their treatment plan  They have identified and are working toward their discharge goals   yes    Visit start and stop times:    3/6/23  Start Time: 1106  Stop Time: 1158  Total Visit Time: 52 minutes

## 2023-05-05 ENCOUNTER — APPOINTMENT (OUTPATIENT)
Dept: LAB | Facility: HOSPITAL | Age: 58
End: 2023-05-05

## 2023-05-05 DIAGNOSIS — L40.0 PSORIASIS VULGARIS: ICD-10-CM

## 2023-05-05 LAB
ALBUMIN SERPL BCP-MCNC: 4.2 G/DL (ref 3.5–5)
ALP SERPL-CCNC: 44 U/L (ref 34–104)
ALT SERPL W P-5'-P-CCNC: 13 U/L (ref 7–52)
ANION GAP SERPL CALCULATED.3IONS-SCNC: 5 MMOL/L (ref 4–13)
AST SERPL W P-5'-P-CCNC: 18 U/L (ref 13–39)
BASOPHILS # BLD AUTO: 0.03 THOUSANDS/ÂΜL (ref 0–0.1)
BASOPHILS NFR BLD AUTO: 1 % (ref 0–1)
BILIRUB SERPL-MCNC: 0.35 MG/DL (ref 0.2–1)
BUN SERPL-MCNC: 10 MG/DL (ref 5–25)
CALCIUM SERPL-MCNC: 9.7 MG/DL (ref 8.4–10.2)
CHLORIDE SERPL-SCNC: 107 MMOL/L (ref 96–108)
CO2 SERPL-SCNC: 28 MMOL/L (ref 21–32)
CREAT SERPL-MCNC: 0.94 MG/DL (ref 0.6–1.3)
EOSINOPHIL # BLD AUTO: 0.13 THOUSAND/ÂΜL (ref 0–0.61)
EOSINOPHIL NFR BLD AUTO: 3 % (ref 0–6)
ERYTHROCYTE [DISTWIDTH] IN BLOOD BY AUTOMATED COUNT: 12.2 % (ref 11.6–15.1)
GFR SERPL CREATININE-BSD FRML MDRD: 89 ML/MIN/1.73SQ M
GLUCOSE P FAST SERPL-MCNC: 82 MG/DL (ref 65–99)
HCT VFR BLD AUTO: 45.9 % (ref 36.5–49.3)
HGB BLD-MCNC: 15.1 G/DL (ref 12–17)
IMM GRANULOCYTES # BLD AUTO: 0.01 THOUSAND/UL (ref 0–0.2)
IMM GRANULOCYTES NFR BLD AUTO: 0 % (ref 0–2)
LYMPHOCYTES # BLD AUTO: 2.29 THOUSANDS/ÂΜL (ref 0.6–4.47)
LYMPHOCYTES NFR BLD AUTO: 47 % (ref 14–44)
MCH RBC QN AUTO: 31.2 PG (ref 26.8–34.3)
MCHC RBC AUTO-ENTMCNC: 32.9 G/DL (ref 31.4–37.4)
MCV RBC AUTO: 95 FL (ref 82–98)
MONOCYTES # BLD AUTO: 0.35 THOUSAND/ÂΜL (ref 0.17–1.22)
MONOCYTES NFR BLD AUTO: 7 % (ref 4–12)
NEUTROPHILS # BLD AUTO: 1.99 THOUSANDS/ÂΜL (ref 1.85–7.62)
NEUTS SEG NFR BLD AUTO: 42 % (ref 43–75)
NRBC BLD AUTO-RTO: 0 /100 WBCS
PLATELET # BLD AUTO: 187 THOUSANDS/UL (ref 149–390)
PMV BLD AUTO: 10.7 FL (ref 8.9–12.7)
POTASSIUM SERPL-SCNC: 4.1 MMOL/L (ref 3.5–5.3)
PROT SERPL-MCNC: 6.5 G/DL (ref 6.4–8.4)
RBC # BLD AUTO: 4.84 MILLION/UL (ref 3.88–5.62)
SODIUM SERPL-SCNC: 140 MMOL/L (ref 135–147)
WBC # BLD AUTO: 4.8 THOUSAND/UL (ref 4.31–10.16)

## 2023-05-07 LAB
GAMMA INTERFERON BACKGROUND BLD IA-ACNC: 0.35 IU/ML
M TB IFN-G BLD-IMP: NEGATIVE
M TB IFN-G CD4+ BCKGRND COR BLD-ACNC: -0.11 IU/ML
M TB IFN-G CD4+ BCKGRND COR BLD-ACNC: -0.14 IU/ML
MITOGEN IGNF BCKGRD COR BLD-ACNC: >10 IU/ML

## 2023-05-15 DIAGNOSIS — F41.9 ANXIETY: ICD-10-CM

## 2023-05-15 RX ORDER — DULOXETIN HYDROCHLORIDE 60 MG/1
CAPSULE, DELAYED RELEASE ORAL
Qty: 30 CAPSULE | Refills: 1 | Status: SHIPPED | OUTPATIENT
Start: 2023-05-15

## 2023-05-15 RX ORDER — DULOXETIN HYDROCHLORIDE 30 MG/1
CAPSULE, DELAYED RELEASE ORAL
Qty: 30 CAPSULE | Refills: 1 | Status: SHIPPED | OUTPATIENT
Start: 2023-05-15

## 2023-05-15 NOTE — TELEPHONE ENCOUNTER
Patient calling for RF of duloxetine  Patient of Dr Patti Clark, who is currently unavailable   Forwarding to available providers for review and approval

## 2023-05-22 ENCOUNTER — SOCIAL WORK (OUTPATIENT)
Dept: BEHAVIORAL/MENTAL HEALTH CLINIC | Facility: CLINIC | Age: 58
End: 2023-05-22

## 2023-05-22 DIAGNOSIS — F17.209 TOBACCO USE DISORDER, CONTINUOUS: ICD-10-CM

## 2023-05-22 DIAGNOSIS — F25.1 SCHIZOAFFECTIVE DISORDER, DEPRESSIVE TYPE (HCC): Primary | ICD-10-CM

## 2023-05-22 NOTE — PSYCH
Behavioral Health Psychotherapy Progress Note    Psychotherapy Provided: Individual Psychotherapy     1  Schizoaffective disorder, depressive type ([address] )        2  Tobacco use disorder, continuous            Goals addressed in session: Goal 1 and Goal 2     DATA:     Rosamaria Patel shared about vacation with his brother  Family actually did not go  They had some tough moments here and there  He remains concerned about finances  We discussed things associated with [facility] costs, and having challenging conversations with his brother  Brother has concerns about clients gender fluidity and bisexuality, his thin frame and appearance and low weight  Ct  Following up with doctors and self care  Rosamaria Comment continues to work on [employer], and maintenance support of his mh issues  Ct  Continues to take meds as rx'd  He does not want to expand his social outlets beyond family and the few other people he already knows  Worries about his dog b/c she is getting older  Ct  Helps out with Hh, cooking and other things to support in the [facility]  During this session, this clinician used the following therapeutic modalities: Cognitive Behavioral Therapy, Dialectical Behavior Therapy, Mindfulness-based Strategies, Motivational Interviewing and Supportive Psychotherapy    Substance Abuse was not addressed during this session  If the client is diagnosed with a co-occurring substance use disorder, please indicate any changes in the frequency or amount of use: Ct  Continues hand rolled cigarette use    Stage of change for addressing substance use diagnoses: Contemplation    ASSESSMENT:  Dariana Vidales presents with a Anxious and Dysthymic mood  his affect is Normal range and intensity, which is congruent, with his mood and the content of the session  The client has made progress on their goals  Dariana Vidales presents with a none risk of suicide, none risk of self-harm, and none risk of harm to others      For any risk assessment that "surpasses a \"low\" rating, a safety plan must be developed  A safety plan was indicated: no  If yes, describe in detail     PLAN: Between sessions, Fariba Lima will continue to work on his tx goals    At the next session, the therapist will use Cognitive Behavioral Therapy, Dialectical Behavior Therapy, Mindfulness-based Strategies, Motivational Interviewing and Supportive Psychotherapy to address same  RS for 7 weeks    Behavioral Health Treatment Plan and Discharge Planning: Fariba Lima is aware of and agrees to continue to work on their treatment plan  They have identified and are working toward their discharge goals   yes    Visit start and stop times:    5/22/23  Start Time: 1105  Stop Time: 1157  Total Visit Time: 52 minutes  "

## 2023-07-25 ENCOUNTER — SOCIAL WORK (OUTPATIENT)
Dept: BEHAVIORAL/MENTAL HEALTH CLINIC | Facility: CLINIC | Age: 58
End: 2023-07-25
Payer: COMMERCIAL

## 2023-07-25 DIAGNOSIS — F25.1 SCHIZOAFFECTIVE DISORDER, DEPRESSIVE TYPE (HCC): Primary | ICD-10-CM

## 2023-07-25 DIAGNOSIS — F17.209 TOBACCO USE DISORDER, CONTINUOUS: ICD-10-CM

## 2023-07-25 PROCEDURE — 90834 PSYTX W PT 45 MINUTES: CPT | Performed by: COUNSELOR

## 2023-07-25 NOTE — PSYCH
Behavioral Health Psychotherapy Progress Note    Psychotherapy Provided: Individual Psychotherapy     1. Schizoaffective disorder, depressive type ([address])        2. Tobacco use disorder, continuous            Goals addressed in session: Goal 1 and Goal 2     DATA:     Jesus Mcdonough was late due to transport, per his report. He discussed challenges in the family. Reviewed issues of gender identity and preference. Ct. Valued discussion around the genderbread person, and issues of sex, gender and presentation. "I am non-binary, more than male or female.". Ct was assigned at birth male (sex), gender is non-binary, and presentation is neutral, long hair, but wearing beard for brother, who gets upset when people confuse them for a couple. Ct. Feeling okay with beard now, but if lived alone, would probably shave. Ct. Discussed family issues around nephew in process of addressing heroin addiction, being in [facility] doing work for his father/client's brother. Ct. Given info about MES program.  Sister to get  and has been up to visit. Ct. Reports that his psoriasis is doing better and his hands are doing well. "They wanted to take me off a med, but I am afraid to do so. They are finally doing better. ". During this session, this clinician used the following therapeutic modalities: Cognitive Behavioral Therapy, Dialectical Behavior Therapy, Mindfulness-based Strategies, Motivational Interviewing and Supportive Psychotherapy    Substance Abuse was not addressed during this session. If the client is diagnosed with a co-occurring substance use disorder, please indicate any changes in the frequency or amount of use: Ct. Continues hand rolled cigarette use. . Stage of change for addressing substance use diagnoses: Contemplation    ASSESSMENT:  Sadie Jacobson presents with a Anxious and Dysthymic mood. his affect is Normal range and intensity, which is congruent, with his mood and the content of the session.  The client has made progress on their goals. Cherri Arguello presents with a none risk of suicide, none risk of self-harm, and none risk of harm to others. For any risk assessment that surpasses a "low" rating, a safety plan must be developed. A safety plan was indicated: no  If yes, describe in detail     PLAN: Between sessions, Cherri Arguello will continue to work on his tx goals. He continues to appreciate tx to ground him, give support and continue to build on his confidence, assertive skills and coping. . At the next session, the therapist will use Cognitive Behavioral Therapy, Dialectical Behavior Therapy, Mindfulness-based Strategies, Motivational Interviewing and Supportive Psychotherapy to address same. RS for 7 weeks  . Behavioral Health Treatment Plan and Discharge Planning: Cherri Arguello is aware of and agrees to continue to work on their treatment plan. They have identified and are working toward their discharge goals.  yes    Visit start and stop times:    7/25/23  Start Time: 1012  Stop Time: 1104  Total Visit Time: 52 minutes

## 2023-07-27 NOTE — PATIENT INSTRUCTIONS
Dewey Garibay [id number]   Thanks for presenting to today's Appointment at [facility]  Duloxetine was increased 60mg tablet twice daily  Your  other medications were not changed
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
## 2023-08-03 ENCOUNTER — OFFICE VISIT (OUTPATIENT)
Dept: PSYCHIATRY | Facility: CLINIC | Age: 58
End: 2023-08-03
Payer: COMMERCIAL

## 2023-08-03 DIAGNOSIS — F41.1 GENERALIZED ANXIETY DISORDER: ICD-10-CM

## 2023-08-03 DIAGNOSIS — F20.9 SCHIZOPHRENIA, UNSPECIFIED TYPE (HCC): ICD-10-CM

## 2023-08-03 PROCEDURE — 99214 OFFICE O/P EST MOD 30 MIN: CPT | Performed by: PSYCHIATRY & NEUROLOGY

## 2023-08-03 RX ORDER — AMITRIPTYLINE HYDROCHLORIDE 50 MG/1
TABLET, FILM COATED ORAL
Qty: 90 TABLET | Refills: 0 | Status: SHIPPED | OUTPATIENT
Start: 2023-08-03

## 2023-08-03 RX ORDER — FLUTICASONE FUROATE, UMECLIDINIUM BROMIDE AND VILANTEROL TRIFENATATE 100; 62.5; 25 UG/1; UG/1; UG/1
1 POWDER RESPIRATORY (INHALATION) DAILY
COMMUNITY
Start: 2023-08-02

## 2023-08-03 RX ORDER — RISPERIDONE 4 MG/1
TABLET ORAL
Qty: 90 TABLET | Refills: 0 | Status: SHIPPED | OUTPATIENT
Start: 2023-08-03

## 2023-08-03 RX ORDER — DULOXETIN HYDROCHLORIDE 60 MG/1
CAPSULE, DELAYED RELEASE ORAL
Qty: 60 CAPSULE | Refills: 1 | Status: SHIPPED | OUTPATIENT
Start: 2023-08-03

## 2023-08-22 ENCOUNTER — HOSPITAL ENCOUNTER (OUTPATIENT)
Dept: CT IMAGING | Facility: HOSPITAL | Age: 58
Discharge: HOME | End: 2023-08-22
Payer: MEDICARE

## 2023-08-22 DIAGNOSIS — R91.8 LUNG NODULES: ICD-10-CM

## 2023-08-22 PROCEDURE — 71250 CT THORAX DX C-: CPT

## 2023-08-22 PROCEDURE — G1004 CDSM NDSC: HCPCS

## 2023-09-07 NOTE — PATIENT INSTRUCTIONS
Ynes Cisneros [id number]   Thanks for presenting to today's Appointment at [facility]  Your medications were not changed
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
## 2023-09-08 ENCOUNTER — OFFICE VISIT (OUTPATIENT)
Dept: PSYCHIATRY | Facility: CLINIC | Age: 58
End: 2023-09-08
Payer: COMMERCIAL

## 2023-09-08 DIAGNOSIS — F43.9 TRAUMA AND STRESSOR-RELATED DISORDER: ICD-10-CM

## 2023-09-08 DIAGNOSIS — F41.1 GENERALIZED ANXIETY DISORDER: ICD-10-CM

## 2023-09-08 DIAGNOSIS — F20.9 SCHIZOPHRENIA, UNSPECIFIED TYPE (HCC): Primary | ICD-10-CM

## 2023-09-08 PROCEDURE — 99214 OFFICE O/P EST MOD 30 MIN: CPT | Performed by: PSYCHIATRY & NEUROLOGY

## 2023-09-08 RX ORDER — DULOXETIN HYDROCHLORIDE 60 MG/1
CAPSULE, DELAYED RELEASE ORAL
Qty: 60 CAPSULE | Refills: 1 | Status: SHIPPED | OUTPATIENT
Start: 2023-09-08

## 2023-09-14 ENCOUNTER — SOCIAL WORK (OUTPATIENT)
Dept: BEHAVIORAL/MENTAL HEALTH CLINIC | Facility: CLINIC | Age: 58
End: 2023-09-14
Payer: COMMERCIAL

## 2023-09-14 DIAGNOSIS — F17.209 TOBACCO USE DISORDER, CONTINUOUS: ICD-10-CM

## 2023-09-14 DIAGNOSIS — F25.1 SCHIZOAFFECTIVE DISORDER, DEPRESSIVE TYPE (HCC): Primary | ICD-10-CM

## 2023-09-14 PROCEDURE — 90834 PSYTX W PT 45 MINUTES: CPT | Performed by: COUNSELOR

## 2023-09-14 NOTE — BH CRISIS PLAN
Client Name: Marisa Burgos       Client YOB: 1965  : 1965    Treatment Team (include name and contact information):     Psychotherapist: Sue Styles MA    Psychiatrist: Dr. Maurisio Jaramillo of information completed: no      Healthcare Provider  Maria T Gillesbrittany Evens, DO  [address] [facility]  [place] [address]    Type of Plan   * Child plans (children 15 yo and younger) must be completed and signed by the child's legal guardian   * Plans for all individuals 15 yo and above must be signed by the client. Plan Type: adolescent/adult (15 and over) Update/Review      My Personal Strengths are (in the client's own words): "I mostly wake up with a positive mood, loyal, caring, responsible, willing to pitch in, try to be healthy, determined and have good supports."    The stressors and triggers that may put me at risk are:   chronic anxiety, chronic mental illness, death of Mom, family issues and financial problems, chronic anxiety, chronic mental illness, death of  chronic anxiety, chronic mental illness, death of Mom, family issues and financial problems, chronic anxiety, chronic mental illness, death of mom, family issues and financial problems, family issues and financial problems    Coping skills I can use to keep myself calm and safe: Take a shower, Listen to music, Physical activity, Call a friend or family member, [place]/meditate, Increased contact with professional supports. Coping skills/supports I can use to maintain abstinence from substance use:   not addressing tobacco cessation at this time    The people that provide me with help and support: (Include name, contact, and how they can help)   Support person #1: Meenakshi Reyes    * Phone number: in cell phone    * How can they help me? Listen, ground me, be supportive and encouraging. Support person #2: Bibi Kraft    * Phone number: in cell phone    * How can they help me?   Listen, ground me, be supportive and encouraging. Support person #3: Aakash Robert    * Phone number: in cell phone    * How can they help me? Listen, ground me, be supportive and encouraging. In the past, the following has helped me in times of crisis:    Being in a quiet space, Being with other people, Taking medications, Talking to a professional on the telephone, Calling a friend, Calling a family member, Taking a walk or exercising, Breathing exercises (or other mindfulness-based activities), Praying or meditating, Using de-escalation tools that I have learned, Listening to music, Watching television or a movie and Other: time with dog, Hillary Dey. If it is an emergency and you need immediate help, call 9-1-1    If there is a possibility of danger to yourself or others, call the following crisis hotline resources:     Adult Crisis Numbers  Suicide Prevention Hotline - Dial 9-8-8  [facility]: [address] [facility] Street: [address] [place] [address] 98: [address] [facility] [address]: [phone]  [address] [place] [address] Street: [phone]  [facility]: [address] [facility] [address]: [address]: [phone] (daytime). [phone] (after hours, weekends, holidays)     Child/Adolescent Crisis Numbers   [facility]: [address]: [phone]   [facility]: [phone]   [address] [place] [address] Street: [phone]    Please note: Some [facility] do not have a separate number for Child/Adolescent specific crisis. If your county is not listed under Child/Adolescent, please call the adult number for your county     National Talk to Text Line   All Ages - [phone]    In the event your feelings become unmanageable, and you cannot reach your support system, you will call 911 immediately or go to the nearest hospital emergency room.

## 2023-09-14 NOTE — PSYCH
Behavioral Health Psychotherapy Progress Note    Psychotherapy Provided: Individual Psychotherapy     1. Schizoaffective disorder, depressive type ([address])        2. Tobacco use disorder, continuous            Goals addressed in session: Goal 1     DATA:     Elidia Du discussed current events. Reviewed psychiatric visit notes. Elidia Du said that he is doing okay, taking increased Duloxetine. Denies AH since episode documented by Dr. Michelle Durán. Sleeping about 8 hours nightly. Pitching in around the house. Appetite is low. Weight stable. Psoriasis is stable, hands are without cuts right now. Walking almost daily. Walking with and without the dog for about a half hour. Making connections with some of their neighbors. Getting along okay with brother. Some stress around brother and [address] Gets along with Joey Claudio son (23) fine. Updated treatment plan and crisis plan with client. See form for details. During this session, this clinician used the following therapeutic modalities: Cognitive Behavioral Therapy, Dialectical Behavior Therapy, Mindfulness-based Strategies, Motivational Interviewing and Supportive Psychotherapy    Substance Abuse was not addressed during this session. If the client is diagnosed with a co-occurring substance use disorder, please indicate any changes in the frequency or amount of use: Ct. Continues hand rolled cigarette use. . Stage of change for addressing substance use diagnoses: Contemplation    ASSESSMENT:  Angelo Sneed presents with a Anxious and Dysthymic mood. his affect is Normal range and intensity, which is congruent, with his mood and the content of the session. The client has made progress on their goals. Angelo Sneed presents with a none risk of suicide, none risk of self-harm, and none risk of harm to others. For any risk assessment that surpasses a "low" rating, a safety plan must be developed.     A safety plan was indicated: no  If yes, describe in detail     PLAN: Between sessions, Nolan Scott will continue to work on his tx goals. He continues to appreciate tx to ground him, give support and continue to build on his confidence, assertive skills and coping. . At the next session, the therapist will use Cognitive Behavioral Therapy, Dialectical Behavior Therapy, Mindfulness-based Strategies, Motivational Interviewing and Supportive Psychotherapy to address same. RS for 6 weeks. .    Behavioral Health Treatment Plan and Discharge Planning: Nolan Scott is aware of and agrees to continue to work on their treatment plan. They have identified and are working toward their discharge goals.  yes    Visit start and stop times:    9/14/23  Start Time: 1004  Stop Time: 1056  Total Visit Time: 52 minutes

## 2023-09-14 NOTE — BH TREATMENT PLAN
Lamar Khan  1965       Date of Initial Treatment Plan: see Credible for past plans   Date of Current Treatment Plan: 09/14/23    Treatment Plan Number 1 in Epic     Strengths/Personal Resources for Self Care: Cooking, desire to be healthy, caring of loved ones, dedicated to family, pitch in and help when needed. Diagnosis:   1. Schizoaffective disorder, depressive type ([address])        2. Tobacco use disorder, continuous            Area of Needs: maintenance support to deal effectively with MH, Outlet to vent and manage life stress, Continue to build confidence and manage life stress. Long Term Goal 1: "I want to feel happier in my life so I don't feel so depressed. I like to have a place to talk about things that scare me, grouind my fears and build better skills to manage in healthier way.". Target Date: 3/13/24  Completion Date: 3/13/24         Short Term Objectives for Goal 1: Maintenance support visits q 6 weeks with Gladis to be stable in face of life stress. Behavioral Health Treatment Plan [facility]: Diagnosis and Treatment Plan explained to Darshan Caldwell relates understanding diagnosis and is agreeable to Treatment Plan. Client Comments : Please share your thoughts, feelings, need and/or experiences regarding your treatment plan: Hardy Hennessy agreed to above listed tx plan.

## 2023-10-19 ENCOUNTER — APPOINTMENT (OUTPATIENT)
Dept: LAB | Facility: HOSPITAL | Age: 58
End: 2023-10-19
Payer: MEDICARE

## 2023-10-19 DIAGNOSIS — E87.1 HYPOSMOLALITY SYNDROME: ICD-10-CM

## 2023-10-19 DIAGNOSIS — M81.0 SENILE OSTEOPOROSIS: ICD-10-CM

## 2023-10-19 DIAGNOSIS — J43.9 EMPHYSEMATOUS BLEB (HCC): ICD-10-CM

## 2023-10-19 DIAGNOSIS — Z79.899 ENCOUNTER FOR LONG-TERM (CURRENT) USE OF OTHER MEDICATIONS: ICD-10-CM

## 2023-10-19 DIAGNOSIS — E78.00 PURE HYPERCHOLESTEROLEMIA: ICD-10-CM

## 2023-10-19 DIAGNOSIS — Z72.0 TOBACCO ABUSE: ICD-10-CM

## 2023-10-19 LAB
ALBUMIN SERPL BCP-MCNC: 4.2 G/DL (ref 3.5–5)
ALP SERPL-CCNC: 48 U/L (ref 34–104)
ALT SERPL W P-5'-P-CCNC: 13 U/L (ref 7–52)
ANION GAP SERPL CALCULATED.3IONS-SCNC: 5 MMOL/L
AST SERPL W P-5'-P-CCNC: 21 U/L (ref 13–39)
BASOPHILS # BLD AUTO: 0.02 THOUSANDS/ÂΜL (ref 0–0.1)
BASOPHILS NFR BLD AUTO: 0 % (ref 0–1)
BILIRUB SERPL-MCNC: 0.37 MG/DL (ref 0.2–1)
BUN SERPL-MCNC: 8 MG/DL (ref 5–25)
CALCIUM SERPL-MCNC: 9.6 MG/DL (ref 8.4–10.2)
CHLORIDE SERPL-SCNC: 105 MMOL/L (ref 96–108)
CHOLEST SERPL-MCNC: 144 MG/DL
CO2 SERPL-SCNC: 28 MMOL/L (ref 21–32)
CREAT SERPL-MCNC: 0.75 MG/DL (ref 0.6–1.3)
EOSINOPHIL # BLD AUTO: 0.34 THOUSAND/ÂΜL (ref 0–0.61)
EOSINOPHIL NFR BLD AUTO: 6 % (ref 0–6)
ERYTHROCYTE [DISTWIDTH] IN BLOOD BY AUTOMATED COUNT: 12.5 % (ref 11.6–15.1)
GFR SERPL CREATININE-BSD FRML MDRD: 101 ML/MIN/1.73SQ M
GLUCOSE P FAST SERPL-MCNC: 92 MG/DL (ref 65–99)
HCT VFR BLD AUTO: 44.8 % (ref 36.5–49.3)
HDLC SERPL-MCNC: 52 MG/DL
HGB BLD-MCNC: 14.5 G/DL (ref 12–17)
IMM GRANULOCYTES # BLD AUTO: 0.03 THOUSAND/UL (ref 0–0.2)
IMM GRANULOCYTES NFR BLD AUTO: 1 % (ref 0–2)
LDLC SERPL CALC-MCNC: 83 MG/DL (ref 0–100)
LYMPHOCYTES # BLD AUTO: 1.06 THOUSANDS/ÂΜL (ref 0.6–4.47)
LYMPHOCYTES NFR BLD AUTO: 17 % (ref 14–44)
MCH RBC QN AUTO: 31 PG (ref 26.8–34.3)
MCHC RBC AUTO-ENTMCNC: 32.4 G/DL (ref 31.4–37.4)
MCV RBC AUTO: 96 FL (ref 82–98)
MONOCYTES # BLD AUTO: 0.41 THOUSAND/ÂΜL (ref 0.17–1.22)
MONOCYTES NFR BLD AUTO: 7 % (ref 4–12)
NEUTROPHILS # BLD AUTO: 4.3 THOUSANDS/ÂΜL (ref 1.85–7.62)
NEUTS SEG NFR BLD AUTO: 69 % (ref 43–75)
NRBC BLD AUTO-RTO: 0 /100 WBCS
PLATELET # BLD AUTO: 223 THOUSANDS/UL (ref 149–390)
PMV BLD AUTO: 10.1 FL (ref 8.9–12.7)
POTASSIUM SERPL-SCNC: 5 MMOL/L (ref 3.5–5.3)
PROT SERPL-MCNC: 6.8 G/DL (ref 6.4–8.4)
PSA SERPL-MCNC: 0.09 NG/ML (ref 0–4)
RBC # BLD AUTO: 4.68 MILLION/UL (ref 3.88–5.62)
SODIUM SERPL-SCNC: 138 MMOL/L (ref 135–147)
TRIGL SERPL-MCNC: 47 MG/DL
TSH SERPL DL<=0.05 MIU/L-ACNC: 3.08 UIU/ML (ref 0.45–4.5)
WBC # BLD AUTO: 6.16 THOUSAND/UL (ref 4.31–10.16)

## 2023-10-19 PROCEDURE — 80053 COMPREHEN METABOLIC PANEL: CPT

## 2023-10-19 PROCEDURE — 83036 HEMOGLOBIN GLYCOSYLATED A1C: CPT

## 2023-10-19 PROCEDURE — 80061 LIPID PANEL: CPT

## 2023-10-19 PROCEDURE — 36415 COLL VENOUS BLD VENIPUNCTURE: CPT

## 2023-10-19 PROCEDURE — 85025 COMPLETE CBC W/AUTO DIFF WBC: CPT

## 2023-10-19 PROCEDURE — G0103 PSA SCREENING: HCPCS

## 2023-10-19 PROCEDURE — 84443 ASSAY THYROID STIM HORMONE: CPT

## 2023-10-20 LAB
EST. AVERAGE GLUCOSE BLD GHB EST-MCNC: 120 MG/DL
HBA1C MFR BLD: 5.8 %

## 2023-10-23 ENCOUNTER — SOCIAL WORK (OUTPATIENT)
Dept: BEHAVIORAL/MENTAL HEALTH CLINIC | Facility: CLINIC | Age: 58
End: 2023-10-23
Payer: COMMERCIAL

## 2023-10-23 DIAGNOSIS — F25.1 SCHIZOAFFECTIVE DISORDER, DEPRESSIVE TYPE (HCC): Primary | ICD-10-CM

## 2023-10-23 DIAGNOSIS — F17.209 TOBACCO USE DISORDER, CONTINUOUS: ICD-10-CM

## 2023-10-23 PROCEDURE — 90834 PSYTX W PT 45 MINUTES: CPT | Performed by: COUNSELOR

## 2023-10-23 NOTE — PSYCH
Behavioral Health Psychotherapy Progress Note    Psychotherapy Provided: Individual Psychotherapy     1. Schizoaffective disorder, depressive type ([address])        2. Tobacco use disorder, continuous            Goals addressed in session: Goal 1     DATA:     Jada Brady discussed frustrations with managing finances and brother's agendas. Ct and I explored challenges, options and how to use calm assertive communication with brother. Brother out of work for couple of months adding to pressures in the [facility]. Ct otherwise coping reasonably. Late to appt today due to delay with his ride BCT. Continue to build insights and cioping skills. Jada Brady values counseling as part of his maintenance therapy. Ct. Does not have supports with whom he can share these issues. During this session, this clinician used the following therapeutic modalities: Cognitive Behavioral Therapy, Dialectical Behavior Therapy, Mindfulness-based Strategies, Motivational Interviewing, and Supportive Psychotherapy    Substance Abuse was addressed during this session. If the client is diagnosed with a co-occurring substance use disorder, please indicate any changes in the frequency or amount of use: Ct. Continues to smoke "cigars" (more like a cigarello), approx 8 daily. . Stage of change for addressing substance use diagnoses: Contemplation    ASSESSMENT:  Fatuma Pryor presents with a Dysthymic mood. his affect is Constricted, which is congruent, with his mood and the content of the session. The client has made progress on their goals. Fatuma Pryor presents with a none risk of suicide, none risk of self-harm, and none risk of harm to others. For any risk assessment that surpasses a "low" rating, a safety plan must be developed.     A safety plan was indicated: no  If yes, describe in detail NA    PLAN: Between sessions, Fatuma Pryor will continue to work on above listed issues and tx goal.. At the next session, the therapist will use Cognitive Behavioral Therapy, Dialectical Behavior Therapy, Mindfulness-based Strategies, Motivational Interviewing, and Supportive Psychotherapy to address same. RS for 6 weeks. Behavioral Health Treatment Plan and Discharge Planning: Nolan Solders is aware of and agrees to continue to work on their treatment plan. They have identified and are working toward their discharge goals.  yes    Visit start and stop times:    10/23/23  Start Time: 1020  Stop Time: 1059  Total Visit Time: 39 minutes

## 2023-11-07 NOTE — PATIENT INSTRUCTIONS
Cielo Funk [id number]   Thanks for presenting to today's Appointment at [facility]  Bupropion 150mg was started: 1 tablet po daily  in the morning   Your other medications were not changed

## 2023-11-07 NOTE — PSYCH
[facility]: [facility], [address]    Psychiatric Progress Note  MRN#: [id number]  Caroline Apgar 63 y.o. male    This note was not shared with the patient due to reasonable likelihood of causing patient harm   _________________________________________________________________________________________________________________________________  OFFICE APPOINTMENT   Seen today at [address] [facility] location                                                Patient Caroline Apgar ,1965   Prescriber/Physician: Mino Saavedra DO Physician Location:   [facility] [id number]     This service was provided in the office. Patient is currently located in the [place], where I am  licensed. Patient gave consent to proceed with encounter; acknowledge understanding of security and privacy of encounter   [facility]  Patient verbalized understanding evaluation only involves Psychiatric diagnosing, prescribing, result monitoring   Patient was informed this is a billable service and legal  ___________________________________________________________________________________________________________________________________     Reason for Visit:                        Chief Complaint   Patient presents with   • Depression     Subjective: Magali Koch There's slight sadness, as he reported  missing his mom Vic Sorto)-  2020 denies anhedonia ( he likes to listen to music, read magazines, going on walks)Also ruminated about death of his mom and now thinks he's a burden in to his family- lives with brother ,sister lives in [place].  His mother was the person who use to care for Diana Ayala the most. Denies SI/Hi                      There's also somatic complaints - Sondra Apgar has recent fall - unsteady coordination while trying to clean -occur 2 wks ago . - hit his head , did not go to hospital.    ROS:  Sleep - duration 8 hrs  Psychosis- denies hallucinations , denies delusions  Anxiety- intermittent- a few things occurring in his life, leds to him getting upset . Appetite- he eats dinner per night,at times can't find things to eat , cause of forgetfulness, also he does not drive  therefore unable to get things. In 2017 he weight 120 pound now he's 110 lb, height 5'5    Medication: Osvaldo Reynolds is  Compliant Duloxetine and Risperidone , Amptrypline  Medication s/e: denies     Med trail: Trileptal -allergy- skin swollen , rash; Prozac - not helping and zoning out . Substance Hx:  Tobacco- he smokes 6 cigars daily  Illicit- he last smoked cannabis 1 wk ago  Alcohol- once monthly , denies DUI, seizures,blackouts       Medical ROS:  Constitutional: denies fevers and chills  Respiratory: denies wheezings, + SOB  Cardiovascular: denies chest pain , palpaliation   Musculoskeletal:+ nose pain s/p fall ,   Neurological:   + intermittent  headaches, Denies dizziness  Pertinent items are noted in above all other symptoms are negative             Mental Status Evaluation:  General Appearance:  Osvaldo Reynolds is a 63 y.o.  male casually dressed, dressed appropriately, looks stated age , Wearing glasses   Behavior:  calm, intermittent eye contact   Speech:  normal for rate, rhythm, volume, latency, amount, slightly abnormal prosody   Mood:  normal to sometimes sad   Affect:  normal to sadness   Thought Process:  concrete and logical    Thought Content:  no overt delusions, normal, negative thoughts, ruminating thoughts   Perceptual Disturbances: no auditory hallucinations, no visual hallucinations.  Does not appear responding or preoccupied  Delusions  w/o   Risk Potential: Suicidal Ideations w/o  Homicidal Ideations w/o  Potential for Aggression w/o   Sensorium:  Oriented to person, place [facility]), time/date ( situation   Memory:  recent and remote memory grossly intact   Consciousness:  alert and awake   Attention: attention span and concentration are age appropriate   Insight:  fair    Judgment: Appropriate    Gait/Station: normal   Motor Activity: no abnormal movements,      There were no vitals filed for this visit. Medications:   Current Outpatient Medications on File Prior to Visit   Medication Sig Dispense Refill   • albuterol (PROVENTIL HFA,VENTOLIN HFA) 90 mcg/act inhaler Inhale 2 puffs daily as needed every 4 to 6 hours as needed for wheezing     • alendronate (FOSAMAX) 70 mg tablet Take 70 mg by mouth once a week     • amitriptyline (ELAVIL) 50 mg tablet Amitriptyline 50m tablet po at night PRN 90 tablet 0   • Atrovent HFA 17 MCG/ACT inhaler Inhale 2 puffs 3 (three) times a day     • DULoxetine (CYMBALTA) 60 mg delayed release capsule Duloxetine 60m capsule po twice daily 60 capsule 1   • fluticasone (FLONASE) 50 mcg/act nasal spray 2 sprays     • loratadine (CLARITIN) 10 mg tablet Take 10 mg by mouth daily as needed     • risperiDONE (RisperDAL) 4 mg tablet Risperidone 4m tablet po night 90 tablet 0   • Trelegy Ellipta 100-62.5-25 MCG/ACT inhaler Inhale 1 puff in the morning       No current facility-administered medications on file prior to visit. Also :   •  Trelegy Ellipta 100-62.5-25 MCG/ACT inhaler, Inhale 1 puff in the morning, Disp: , Rfl:       Labs: I have personally reviewed all pertinent laboratory/tests results.    Admission Labs:   CBC:   Lab Results   Component Value Date    WBC 6.16 10/19/2023    RBC 4.68 10/19/2023    HGB 14.5 10/19/2023    HCT 44.8 10/19/2023    MCV 96 10/19/2023     10/19/2023    MCH 31.0 10/19/2023    MCHC 32.4 10/19/2023    RDW 12.5 10/19/2023    MPV 10.1 10/19/2023    NEUTROABS 4.30 10/19/2023     CMP:   Lab Results   Component Value Date    SODIUM 138 10/19/2023    K 5.0 10/19/2023     10/19/2023    CO2 28 10/19/2023    AGAP 5 10/19/2023    BUN 8 10/19/2023    CREATININE 0.75 10/19/2023    GLUC 102 12/04/2022    GLUF 92 10/19/2023    CALCIUM 9.6 10/19/2023    AST 21 10/19/2023    ALT 13 10/19/2023    ALKPHOS 48 10/19/2023    TP 6.8 10/19/2023    ALB 4.2 10/19/2023    TBILI 0.37 10/19/2023    EGFR 101 10/19/2023     Lipid Profile:   Lab Results   Component Value Date    CHOLESTEROL 144 10/19/2023    HDL 52 10/19/2023    TRIG 47 10/19/2023    LDLCALC 83 10/19/2023    NONHDLC 120 04/10/2023     Thyroid Studies:   Lab Results   Component Value Date    CKS1NBODNYIQ 3.075 10/19/2023     EKG   Lab Results   Component Value Date    VENTRATE 72 12/04/2022    ATRIALRATE 72 12/04/2022    PRINT 154 12/04/2022    QRSDINT 84 12/04/2022    QTINT 388 12/04/2022    QTCINT 424 12/04/2022    PAXIS 73 12/04/2022    QRSAXIS 77 12/04/2022    TWAVEAXIS 75 12/04/2022       CT chest wo contrast     Impression   1. 6 mm noncalcified left upper lobe pulmonary nodule. Based on current Fleischner Society 2017 Guidelines on incidental pulmonary nodule, followup non-contrast CT is recommended at 6 months from the initial examination and, if stable at that time, an additional followup is recommended for 18-24 months from the initial examination. 2. Moderate emphysema. Mild coronary artery calcification. The study was marked in EPIC for significant notification, and marked for follow-up      ________________________________________________________________________    A/P  Tarsha Smith, is a 63 y.o.,  male, history of hospitalization for schizophrenia (auditory hallucinations , disorganized thoughts). There interim depression although mild symptoms reported . Case complicated as there rumination about prior problematic relationships and findings of traumatizations w/ depressed mood; denies auditory hallucinations are associated.  Currently with persistant reports of depression in the setting of missing his mom, also otherwise generalized worries      DSM5  Depression, unspecified depressive type  Trauma and Stress Related Disorder   Schizophrenia  Generalized  Anxiety Disorder         PLAN:   History and external records reviewed. Discussed clinical findings and  diagnostic impression; depression  Discussed starting Bupropion for augmentation and smoke cessation   Bupropion 150mg daily in morning  Did not change other  medications    Medications Prescribed During Encounter at [facility]:  -     buPROPion (Wellbutrin XL) 150 mg 24 hr tablet; Bupropion ER 150m tablet po daily in the morning  -     risperiDONE (RisperDAL) 4 mg tablet; Risperidone 4m tablet po night  -     DULoxetine (CYMBALTA) 60 mg delayed release capsule; Duloxetine 60m capsule po twice daily  -     amitriptyline (ELAVIL) 50 mg tablet; Amitriptyline 50m tablet po at night PRN        There are no discontinued medications. Treatement: Risks, benefits, and possible side effects of medications explained to patient and patient verbalizes understanding. Next Appointment at [facility]  :  4-6wk                    Today's Appointment@ [facility]                    Face To Face:  10:02-  10:36                         Encounter Duration: Time Spent  34 mins with Patient. Greater than 50% of total time was spent with the patient         MDM  Number of Diagnoses or Management Options  Diagnosis management comments: 4       Amount and/or Complexity of Data Reviewed  Review and summarize past medical records: yes    Risk of Complications, Morbidity, and/or Mortality  Presenting problems: low  Diagnostic procedures: moderate  Management options: moderate         This note may have been written with the assistance of dictation software. Please excuse any grammatical  errors, misspellings,  and abnormal spacing of letters , sentences or paragraphs .  For accurate interpretation should read note from left to right

## 2023-11-08 ENCOUNTER — OFFICE VISIT (OUTPATIENT)
Dept: PSYCHIATRY | Facility: CLINIC | Age: 58
End: 2023-11-08
Payer: COMMERCIAL

## 2023-11-08 DIAGNOSIS — F32.A DEPRESSION, UNSPECIFIED DEPRESSION TYPE: Primary | ICD-10-CM

## 2023-11-08 DIAGNOSIS — F43.9 TRAUMA AND STRESSOR-RELATED DISORDER: ICD-10-CM

## 2023-11-08 DIAGNOSIS — F20.9 SCHIZOPHRENIA, UNSPECIFIED TYPE (HCC): ICD-10-CM

## 2023-11-08 DIAGNOSIS — F41.1 GENERALIZED ANXIETY DISORDER: ICD-10-CM

## 2023-11-08 PROCEDURE — 99214 OFFICE O/P EST MOD 30 MIN: CPT | Performed by: PSYCHIATRY & NEUROLOGY

## 2023-11-08 RX ORDER — RISPERIDONE 4 MG/1
TABLET ORAL
Qty: 90 TABLET | Refills: 0 | Status: SHIPPED | OUTPATIENT
Start: 2023-11-08

## 2023-11-08 RX ORDER — BUPROPION HYDROCHLORIDE 150 MG/1
TABLET ORAL
Qty: 30 TABLET | Refills: 1 | Status: SHIPPED | OUTPATIENT
Start: 2023-11-08

## 2023-11-08 RX ORDER — AMITRIPTYLINE HYDROCHLORIDE 50 MG/1
TABLET, FILM COATED ORAL
Qty: 90 TABLET | Refills: 0 | Status: SHIPPED | OUTPATIENT
Start: 2023-11-08

## 2023-11-08 RX ORDER — DULOXETIN HYDROCHLORIDE 60 MG/1
CAPSULE, DELAYED RELEASE ORAL
Qty: 180 CAPSULE | Refills: 0 | Status: SHIPPED | OUTPATIENT
Start: 2023-11-08

## 2023-11-30 NOTE — PSYCH
SPINE PATIENTS - NEW PROTOCOL PREVISIT    RECORDS RECEIVED FROM: Internal   REASON FOR VISIT: Facet arthropathy, cervical [M47.812]  Cervical disc herniation [M50.20]  Degeneration of lumbar intervertebral disc [M51.36]  Lumbar disc herniation [M51.26]  Lumbar facet arthropathy [M47.816]    Date of Appt: 12/27/2023   NOTES (FOR ALL VISITS) STATUS DETAILS   OFFICE NOTE from referring provider Internal 11/28/2023 Dr Holly [facility]    OFFICE NOTE from other specialist Internal 11/30/2023 PT [facility]   07/25/2023 Dr Maurer [facility]   07/08/2021 Dr Castillo [facility]   07/08/2021 Dr Duque [facility]   01/11/2019 Dr Elizondo [facility]   10/29/2014 Dr Paulson [facility]    DISCHARGE SUMMARY from hospital N/A    DISCHARGE REPORT from ER N/A    OPERATIVE REPORT Internal 02/16/2023 excisional biopsy of a left cervical node    EMG REPORT Received/Internal 08/15/2023 [facility]  09/16/2019 Dr Mcduffie [facility]    MEDICATION LIST N/A    IMAGING  (FOR ALL VISITS)     MRI (HEAD, NECK, SPINE) Internal 11/20/2023 lumbar cervical spine   XRAY (SPINE) *NEUROSURGERY* N/A    CT (HEAD, NECK, SPINE) N/A        Psychotherapy Provided: Individual Psychotherapy 51 minutes     Length of time in session: 51 minutes, follow up in 6 week    Encounter Diagnosis     ICD-10-CM    1  Schizoaffective disorder, depressive type ([facility] [address] )  F25 1    2  Tobacco use disorder, continuous  F17 209        Goals addressed in session: Goal 1, Goal 2 and Goal 3      Pain:      none    0    Current suicide risk : Low     Data:   Discussed current events and concerns  Addressed associated feelings and issues  Reviewed progress on treatment goal(s)  Discussed grief issues, family dynamics, managing anxiety and self care  Ct  Is working with family, cooking a lot, enjoying his dog, but worrying that she is getting older  Psoraisis acting up again, crack in palm evident  Ct  Happy living with his brother and family  Session addressed above listed issues and concerns  Insights were shared and session worked to further develop coping skills  Considered progress on treatment goals  Obstacles to change were shared  Processed ideas on how to address same  Support given to client when needed  Noted areas of growth and healthy change  Assessment: Client's mood was normal   Affect was normal range and intensity, appropriate  Tone was focused and engaged  Client denied current SI or HI  Stage of change is Action  Client continues to value counseling and treatment support  Plan:  Continue to practice coping strategies and healthy self-care  Identify where useful and where felt challenges in application  Client rescheduled for 12/12 with Dr Garrison Kim for first time and 1/23 with Torrey Narvaez  Behavioral Health Treatment Plan [facility]: Diagnosis and Treatment Plan explained to Marely Maciel relates understanding diagnosis and is agreeable to Treatment Plan   Yes     Visit Time    Visit Start Time: 11:03 am  Visit Stop Time:  11:54 am  Total Visit Duration: 51 minutes

## 2023-12-04 ENCOUNTER — SOCIAL WORK (OUTPATIENT)
Dept: BEHAVIORAL/MENTAL HEALTH CLINIC | Facility: CLINIC | Age: 58
End: 2023-12-04
Payer: COMMERCIAL

## 2023-12-04 DIAGNOSIS — F25.1 SCHIZOAFFECTIVE DISORDER, DEPRESSIVE TYPE (HCC): Primary | ICD-10-CM

## 2023-12-04 DIAGNOSIS — F17.209 TOBACCO USE DISORDER, CONTINUOUS: ICD-10-CM

## 2023-12-04 PROCEDURE — 90834 PSYTX W PT 45 MINUTES: CPT | Performed by: COUNSELOR
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
## 2023-12-12 NOTE — PSYCH
[facility]: [address] [place] [address], [address]    Psychiatric Progress Note  MRN#: [id number]  Marisa Burgos 63 y.o. male    This note was not shared with the patient due to reasonable likelihood of causing patient harm   _________________________________________________________________________________________________________________________________  OFFICE APPOINTMENT   Seen today at [facility] location                                                Patient Marisa Burgos ,1965   Prescriber/Physician: Angelito Sifuentes DO Physician Location:   [address] [place] [id number]     This service was provided in the office. Patient is currently located in the [place], where I am  licensed. Patient gave consent to proceed with encounter; acknowledge understanding of security and privacy of encounter   [facility]  Patient verbalized understanding evaluation only involves Psychiatric diagnosing, prescribing, result monitoring   Patient was informed this is a billable service and legal  ___________________________________________________________________________________________________________________________________     Reason for Visit:                        Chief Complaint   Patient presents with   • Depression     Subjective:    Marisa Burgos -stated his mood as sad ; precipitant factors: dog killed nephews cat ; Timmothy Solis was depressed earlier in the wk, now more calm after burial . HIs sleep is not impaired , stated duration 8 hrs, appetite non-impaired as he's eating 2 meals daily; denies dwelling thoughts. Also , stated his brother noticed improvements described as non- fluctuation  in his mood. Otherwise, he started Bupropion- now smoking sometimes less than 6 cigars.  Defer to tobacco history for other details       ROS:  Sleep -  defer to above  Psychosis- denies hallucination, denies delusions  SI/HI -denies  Depression; severity  6-7 out of 10 , defer to above; normal appetite   Energy- normal  Anxiety- denies; without ruminating thoughts , or concentration impairment   [place]bey- denies  Irritability- denies      Medication: China Moy is  Compliant Bupropion 150mg , Duloxetine  120mg and Risperidone 4mg , Amptrypline  50mg  Medication s/e: denies       Tobacco Use: Medium Risk (12/13/2023)    Patient History    • Smoking Tobacco Use: Former    • Smokeless Tobacco Use: Never    • Passive Exposure: Not on file      Tobacco- he smokes 6 cigars daily; has craving, and agitation , h/o of trying to smoke less- with distractions techniques - works ; h/o smoking cigarettes   Illicit-  Domingo Clayton reported once and while smoking ( cannabis)   Alcohol- occasional       Medical ROS:  Respiratory: has COPD, intermittent SOB - got recuse inhaler  Cardiovascular: denies chest pain , palpaliation   Musculoskeletal: w/o recent falls   Neurological:   Without muscle spasms, Tremors, or restlessness  Pertinent items are noted in above all other symptoms are negative             Mental Status Evaluation:  General Appearance:  China Moy is a 63 y.o.  male casually dressed, dressed appropriately, looks stated age , Wearing glasses   Behavior:  cooperative, calm, appropriate eye contact    Speech:  normal for rate, rhythm, volume, latency, amount,   Mood:  sad   Affect:  normal    Thought Process:  normal and logical    Thought Content:  no overt delusions, normal, ruminating thoughts   Perceptual Disturbances: no auditory hallucinations, no visual hallucinations.  Does not appear responding or preoccupied  Delusions  w/o   Risk Potential: Suicidal Ideations w/o  Homicidal Ideations w/o  Potential for Aggression w/o   Sensorium:  Oriented to person, place [facility] time/date ( Dec 13,  2023) and situation   Memory:  recent and remote memory grossly intact   Consciousness:  alert and awake   Attention: attention span and concentration are age appropriate   Insight:  fair    Judgment: Appropriate    Gait/Station: normal   Motor Activity: no abnormal movements,      There were no vitals filed for this visit. Medications:   Current Outpatient Medications on File Prior to Visit   Medication Sig Dispense Refill   • albuterol (PROVENTIL HFA,VENTOLIN HFA) 90 mcg/act inhaler Inhale 2 puffs daily as needed every 4 to 6 hours as needed for wheezing     • alendronate (FOSAMAX) 70 mg tablet Take 70 mg by mouth once a week     • amitriptyline (ELAVIL) 50 mg tablet Amitriptyline 50m tablet po at night PRN 90 tablet 0   • Atrovent HFA 17 MCG/ACT inhaler Inhale 2 puffs 3 (three) times a day     • buPROPion (Wellbutrin XL) 150 mg 24 hr tablet Bupropion ER 150m tablet po daily in the morning 30 tablet 1   • DULoxetine (CYMBALTA) 60 mg delayed release capsule Duloxetine 60m capsule po twice daily 180 capsule 0   • fluticasone (FLONASE) 50 mcg/act nasal spray 2 sprays     • loratadine (CLARITIN) 10 mg tablet Take 10 mg by mouth daily as needed     • risperiDONE (RisperDAL) 4 mg tablet Risperidone 4m tablet po night 90 tablet 0   • Trelegy Ellipta 100-62.5-25 MCG/ACT inhaler Inhale 1 puff in the morning       No current facility-administered medications on file prior to visit. Also :   •  Trelegy Ellipta 100-62.5-25 MCG/ACT inhaler, Inhale 1 puff in the morning, Disp: , Rfl:       Labs: I have personally reviewed all pertinent laboratory/tests results.    Admission Labs:   CBC:   Lab Results   Component Value Date    WBC 6.16 10/19/2023    RBC 4.68 10/19/2023    HGB 14.5 10/19/2023    HCT 44.8 10/19/2023    MCV 96 10/19/2023     10/19/2023    MCH 31.0 10/19/2023    MCHC 32.4 10/19/2023    RDW 12.5 10/19/2023    MPV 10.1 10/19/2023    NEUTROABS 4.30 10/19/2023     CMP:   Lab Results   Component Value Date    SODIUM 138 10/19/2023    K 5.0 10/19/2023     10/19/2023    CO2 28 10/19/2023    AGAP 5 10/19/2023    BUN 8 10/19/2023    CREATININE 0.75 10/19/2023    GLUC 102 12/04/2022    GLUF 92 10/19/2023    CALCIUM 9.6 10/19/2023    AST 21 10/19/2023    ALT 13 10/19/2023    ALKPHOS 48 10/19/2023    TP 6.8 10/19/2023    ALB 4.2 10/19/2023    TBILI 0.37 10/19/2023    EGFR 101 10/19/2023     Lipid Profile:   Lab Results   Component Value Date    CHOLESTEROL 144 10/19/2023    HDL 52 10/19/2023    TRIG 47 10/19/2023    LDLCALC 83 10/19/2023    NONHDLC 120 04/10/2023     Thyroid Studies:   Lab Results   Component Value Date    CXO9WYYNSXLI 3.075 10/19/2023     EKG   Lab Results   Component Value Date    VENTRATE 72 12/04/2022    ATRIALRATE 72 12/04/2022    PRINT 154 12/04/2022    QRSDINT 84 12/04/2022    QTINT 388 12/04/2022    QTCINT 424 12/04/2022    PAXIS 73 12/04/2022    QRSAXIS 77 12/04/2022    TWAVEAXIS 75 12/04/2022       CT chest wo contrast     Impression   1. 6 mm noncalcified left upper lobe pulmonary nodule. Based on current Fleischner Society 2017 Guidelines on incidental pulmonary nodule, followup non-contrast CT is recommended at 6 months from the initial examination and, if stable at that time, an additional followup is recommended for 18-24 months from the initial examination. 2. Moderate emphysema. Mild coronary artery calcification. The study was marked in EPIC for significant notification, and marked for follow-up      ________________________________________________________________________    A/P  Chana Lawler, is a 63 y.o.,  male, history of hospitalization for schizophrenia (auditory hallucinations , disorganized thoughts), Case complicated,  traumatizations  cause of problematic relationship w/ depressed mood; denies auditory hallucinations are associated.  Interim events of depressed mood , cause of rumination , now without thoughts, although acute sadness in the setting of death of a family's members pet . Devoid of SI, plan or intent or other neurovegetative symptoms,  indicative of normal grief. Otherwise with less smoking since Bupropion started         DSM5  1. Tobacco use disorder, mild, abuse    2. Trauma and stressor-related disorder    3. Schizophrenia, unspecified type ([address])    4. Generalized anxiety disorder    5. Depression, unspecified depression type             PLAN:   History and external records reviewed. Discussed clinical findings and  diagnostic impression  Did not change other  medications    Medications Prescribed During Encounter at [facility]:  -     buPROPion (Wellbutrin XL) 150 mg 24 hr tablet; Bupropion ER 150m tablet po daily in the morning    Medication List [facility]  -     risperidONE (RisperDAL) 4 mg tablet; Risperidone 4m tablet po night  -     DULoxetine (CYMBALTA) 60 mg delayed release capsule; Duloxetine 60m capsule po twice daily  -     amitriptyline (ELAVIL) 50 mg tablet; Amitriptyline 50m tablet po at night PRN        There are no discontinued medications. Treatement: Risks, benefits, and possible side effects of medications explained to patient and patient verbalizes understanding. Next Appointment at [facility]  :  4-6wk                    ________________________________________________________________________________________________________________________                    Recent Patient Encounter : 10:03- 10:39                    Duration: Time Spent  34 mins with Patient. Greater than 50% of total time was spent with the patient         MDM  Number of Diagnoses or Management Options  Diagnosis management comments: 5       Amount and/or Complexity of Data Reviewed  Review and summarize past medical records: yes    Risk of Complications, Morbidity, and/or Mortality  Presenting problems: low  Diagnostic procedures: moderate  Management options: moderate         This note may have been written with the assistance of dictation software. Please excuse any grammatical  errors, misspellings,  and abnormal spacing of letters , sentences or paragraphs .  For accurate interpretation should read note from left to right

## 2023-12-12 NOTE — PATIENT INSTRUCTIONS
Tarsha Smith [id number]   Thanks for presenting to today's Appointment at [facility]  Your medications were not changed

## 2023-12-13 ENCOUNTER — OFFICE VISIT (OUTPATIENT)
Dept: PSYCHIATRY | Facility: CLINIC | Age: 58
End: 2023-12-13
Payer: COMMERCIAL

## 2023-12-13 DIAGNOSIS — F20.9 SCHIZOPHRENIA, UNSPECIFIED TYPE (HCC): ICD-10-CM

## 2023-12-13 DIAGNOSIS — F41.1 GENERALIZED ANXIETY DISORDER: ICD-10-CM

## 2023-12-13 DIAGNOSIS — F43.9 TRAUMA AND STRESSOR-RELATED DISORDER: ICD-10-CM

## 2023-12-13 DIAGNOSIS — F17.200 TOBACCO USE DISORDER, MILD, ABUSE: Primary | ICD-10-CM

## 2023-12-13 DIAGNOSIS — F32.A DEPRESSION, UNSPECIFIED DEPRESSION TYPE: ICD-10-CM

## 2023-12-13 PROCEDURE — 99213 OFFICE O/P EST LOW 20 MIN: CPT | Performed by: PSYCHIATRY & NEUROLOGY

## 2023-12-13 RX ORDER — BUPROPION HYDROCHLORIDE 150 MG/1
TABLET ORAL
Qty: 30 TABLET | Refills: 1 | Status: SHIPPED | OUTPATIENT
Start: 2023-12-13

## 2024-02-05 DIAGNOSIS — F41.1 GENERALIZED ANXIETY DISORDER: ICD-10-CM

## 2024-02-05 DIAGNOSIS — F32.A DEPRESSION, UNSPECIFIED DEPRESSION TYPE: ICD-10-CM

## 2024-02-05 RX ORDER — DULOXETIN HYDROCHLORIDE 60 MG/1
CAPSULE, DELAYED RELEASE ORAL
Qty: 180 CAPSULE | Refills: 0 | OUTPATIENT
Start: 2024-02-05

## 2024-02-05 RX ORDER — DULOXETIN HYDROCHLORIDE 60 MG/1
CAPSULE, DELAYED RELEASE ORAL
Qty: 60 CAPSULE | Refills: 1 | Status: SHIPPED | OUTPATIENT
Start: 2024-02-05

## 2024-02-05 NOTE — TELEPHONE ENCOUNTER
Pt Bill Haji, 1965, [facility] chart was reviewed, Duloxetine 60  was sent to [facility] pharmacy     This note was not shared with the patient due to reasonable likelihood of causing patient harm       This note may have been written with the assistance of dictation software. Please excuse any grammatical  errors, misspellings,  and abnormal spacing of letters , sentences or paragraphs . For accurate interpretation should read note horizontally

## 2024-02-06 DIAGNOSIS — F20.9 SCHIZOPHRENIA, UNSPECIFIED TYPE (HCC): ICD-10-CM

## 2024-02-06 DIAGNOSIS — F41.1 GENERALIZED ANXIETY DISORDER: ICD-10-CM

## 2024-02-06 RX ORDER — AMITRIPTYLINE HYDROCHLORIDE 50 MG/1
TABLET, FILM COATED ORAL
Qty: 30 TABLET | Refills: 2 | OUTPATIENT
Start: 2024-02-06

## 2024-02-06 RX ORDER — AMITRIPTYLINE HYDROCHLORIDE 50 MG/1
TABLET, FILM COATED ORAL
Qty: 90 TABLET | Refills: 0 | Status: SHIPPED | OUTPATIENT
Start: 2024-02-06

## 2024-02-06 RX ORDER — RISPERIDONE 4 MG/1
TABLET ORAL
Qty: 30 TABLET | Refills: 2 | OUTPATIENT
Start: 2024-02-06

## 2024-02-06 RX ORDER — RISPERIDONE 4 MG/1
TABLET ORAL
Qty: 90 TABLET | Refills: 0 | Status: SHIPPED | OUTPATIENT
Start: 2024-02-06

## 2024-02-06 NOTE — TELEPHONE ENCOUNTER
Pt Bill Haji, 1965, [facility] chart was reviewed Amitriptyline and Risperidone was sent to [facility] pharmacy     Medications Prescribed During [facility] Encounter:  -     amitriptyline (ELAVIL) 50 mg tablet; Amitriptyline 50m tablet po at night PRN  -     risperiDONE (RisperDAL) 4 mg tablet; Risperidone 4m tablet po night         This note was not shared with the patient due to reasonable likelihood of causing patient harm       This note may have been written with the assistance of dictation software. Please excuse any grammatical  errors, misspellings,  and abnormal spacing of letters , sentences or paragraphs . For accurate interpretation should read note horizontally

## 2024-03-04 ENCOUNTER — SOCIAL WORK (OUTPATIENT)
Dept: BEHAVIORAL/MENTAL HEALTH CLINIC | Facility: CLINIC | Age: 59
End: 2024-03-04
Payer: COMMERCIAL

## 2024-03-04 ENCOUNTER — HOSPITAL ENCOUNTER (EMERGENCY)
Facility: HOSPITAL | Age: 59
Discharge: HOME | End: 2024-03-04
Attending: EMERGENCY MEDICINE | Admitting: EMERGENCY MEDICINE
Payer: MEDICARE

## 2024-03-04 VITALS
RESPIRATION RATE: 18 BRPM | OXYGEN SATURATION: 100 % | HEART RATE: 83 BPM | TEMPERATURE: 98.4 F | DIASTOLIC BLOOD PRESSURE: 89 MMHG | SYSTOLIC BLOOD PRESSURE: 123 MMHG

## 2024-03-04 DIAGNOSIS — F43.9 TRAUMA AND STRESSOR-RELATED DISORDER: ICD-10-CM

## 2024-03-04 DIAGNOSIS — B37.0 ORAL THRUSH: Primary | ICD-10-CM

## 2024-03-04 DIAGNOSIS — F17.209 TOBACCO USE DISORDER, CONTINUOUS: ICD-10-CM

## 2024-03-04 DIAGNOSIS — R22.0 LIP SWELLING: ICD-10-CM

## 2024-03-04 DIAGNOSIS — F25.1 SCHIZOAFFECTIVE DISORDER, DEPRESSIVE TYPE (HCC): Primary | ICD-10-CM

## 2024-03-04 DIAGNOSIS — F41.1 GENERALIZED ANXIETY DISORDER: ICD-10-CM

## 2024-03-04 PROCEDURE — 90834 PSYTX W PT 45 MINUTES: CPT | Performed by: COUNSELOR

## 2024-03-04 PROCEDURE — 99283 EMERGENCY DEPT VISIT LOW MDM: CPT | Performed by: EMERGENCY MEDICINE

## 2024-03-04 PROCEDURE — 99283 EMERGENCY DEPT VISIT LOW MDM: CPT

## 2024-03-04 NOTE — PSYCH
"Behavioral Health Psychotherapy Progress Note    Psychotherapy Provided: Individual Psychotherapy     1. Schizoaffective disorder, depressive type (HCC)        2. Generalized anxiety disorder        3. Trauma and stressor-related disorder        4. Tobacco use disorder, continuous            Goals addressed in session: Goal 1     DATA:     Ct reports \"Lots going on\". \"I'm not feeling well, I have a cold.  Lost sense of taste for a bit over the weekend, but back.  It started Friday.\".  Encouraged client to test for covid.  Discussed other health issues.  \"A CT scan found 2 nodules on lung about 6 months ago.  Seeing pulmonologist today about same.  Brother is going to drive me.  Psoraisis on hands is doing okay with Stelara shot q 12 weeks.  Trelligy for COPD may be causing some problems with dry mouth and swelling of lips.\"/    Ct. Reports that his nephew Dylan, and gf Willy had been living in the woods nearby because homeless.  Dylan took stuff from Valentina at the start of the relationship with ct's brother, so she does not feel comfortable having them stay at the house. Bill's former sister in law, Sadaf moved to [place] and the kids moved out there with her in Jan.  Valentina put up cameras in  in Oct/Nov, in the kitchen, and by the front and garage door.  \"That has not been very comfortable.\".     \" and Valentina are splitting up, but still friends.   is selling the house.\".  Plan is for brothmodesto Lugo to move with client back to [facility] where Bill used to live.  Brother  says he will buy a trailer and they can live there cheaply with ct's dog, Cherise.  \" and Valentina had been fighting a lot. Valentina seemed unhappy.  Now that they are breaking up, she seems a lot happier.\". Discussed mixed feelings about returning to [facility], but thinks it will be okay.    Ct reports feeling comfortable with Dr. Valles.  Med change of adding wellbutrin is helping smoking craving a little bit but not a lot.  Ct " "reports smoking cigarellos, about 7 daily.  Stress adding to amount smoking.  MJ is about once weekly, couple hits off a bowl.    Sleep and mood reportedly okay.  Depression reported as moderate. Denies AH, or other.  No Delusions, and no si or hi.    Continue to work on building insights, coping and confidence to communicate in a calm assertive manner.    During this session, this clinician used the following therapeutic modalities: Cognitive Behavioral Therapy, Dialectical Behavior Therapy, Mindfulness-based Strategies, Motivational Interviewing, and Supportive Psychotherapy    Substance Abuse was addressed during this session. If the client is diagnosed with a co-occurring substance use disorder, please indicate any changes in the frequency or amount of use: see above. Stage of change for addressing substance use diagnoses: Contemplation    ASSESSMENT:  Bill Haij presents with a Euthymic/ normal mood.     his affect is Constricted, which is congruent, with his mood and the content of the session. The client has made progress on their goals.    Bill Haji presents with a none risk of suicide, none risk of self-harm, and none risk of harm to others.    For any risk assessment that surpasses a \"low\" rating, a safety plan must be developed.    A safety plan was indicated: no  If yes, describe in detail NA    PLAN: Between sessions, Bill Haji will continue to work on above issues and tx goal.. At the next session, the therapist will use Cognitive Behavioral Therapy, Dialectical Behavior Therapy, Mindfulness-based Strategies, Motivational Interviewing, and Supportive Psychotherapy to address same.    Behavioral Health Treatment Plan and Discharge Planning: Bill Haji is aware of and agrees to continue to work on their treatment plan. They have identified and are working toward their discharge goals. yes    Visit start and stop times:    03/04/24  Start Time: 1003  Stop Time: 1055  Total Visit Time: 52 " minutes

## 2024-03-04 NOTE — ED PROVIDER NOTES
History  Chief Complaint   Patient presents with    Medical Problem     Patient presents to the ED with c/o lip swelling since yesterday. States he feels like they are burning. States also feels slightly SOB, like he has a cold. States on a new medication but started it a few weeks ago.      59-year-old male presents for evaluation of lower lip swelling and redness.  Denies any trouble swallowing talking or breathing.  The patient recently was started on antifungal medication for oral thrush which has been a recurrent issue likely secondary to the patient's inhaled steroid.  Patient states that his symptoms were worse last evening.      Medical Problem      Prior to Admission Medications   Prescriptions Last Dose Informant Patient Reported? Taking?   Atrovent HFA 17 MCG/ACT inhaler   Yes No   Sig: Inhale 2 puffs 3 (three) times a day   DULoxetine (CYMBALTA) 60 mg delayed release capsule   No No   Sig: Duloxetine 60m capsule po twice daily   Trelegy Ellipta 100-62.5-25 MCG/ACT inhaler   Yes No   Sig: Inhale 1 puff in the morning   albuterol (PROVENTIL HFA,VENTOLIN HFA) 90 mcg/act inhaler   Yes No   Sig: Inhale 2 puffs daily as needed every 4 to 6 hours as needed for wheezing   alendronate (FOSAMAX) 70 mg tablet   Yes No   Sig: Take 70 mg by mouth once a week   amitriptyline (ELAVIL) 50 mg tablet   No No   Sig: Amitriptyline 50m tablet po at night PRN   buPROPion (Wellbutrin XL) 150 mg 24 hr tablet   No No   Sig: Bupropion ER 150m tablet po daily in the morning   fluticasone (FLONASE) 50 mcg/act nasal spray   Yes No   Si sprays   loratadine (CLARITIN) 10 mg tablet   Yes No   Sig: Take 10 mg by mouth daily as needed   risperiDONE (RisperDAL) 4 mg tablet   No No   Sig: Risperidone 4m tablet po night      Facility-Administered Medications: None       Past Medical History:   Diagnosis Date    Asthma     Bipolar 1 disorder (HCC)     COPD (chronic obstructive pulmonary disease) (HCC)     Emphysema lung  (HCC)     Hypertension        History reviewed. No pertinent surgical history.    History reviewed. No pertinent family history.  I have reviewed and agree with the history as documented.    E-Cigarette/Vaping    E-Cigarette Use Never User      E-Cigarette/Vaping Substances     Social History     Tobacco Use    Smoking status: Former     Types: Cigars    Smokeless tobacco: Never    Tobacco comments:     Bill Haji , smoke 6 cigars daily. H/o cigarettes ,    Vaping Use    Vaping status: Never Used   Substance Use Topics    Alcohol use: Not Currently     Comment: occassional    Drug use: Yes     Types: Marijuana       Review of Systems    Physical Exam  Physical Exam  Vitals and nursing note reviewed.   Constitutional:       General: He is not in acute distress.     Appearance: He is well-developed.   HENT:      Head: Normocephalic and atraumatic.      Right Ear: External ear normal.      Left Ear: External ear normal.      Mouth/Throat:      Mouth: Mucous membranes are dry.      Comments: Mild erythema and swelling to the lower lip.  There is no signs of angioedema, anaphylaxis.  There is no sublingual swelling to suggest Stevie's.  There is no posterior oropharyngeal swelling or edema.  Eyes:      General: No scleral icterus.  Cardiovascular:      Rate and Rhythm: Normal rate and regular rhythm.      Heart sounds: No murmur heard.  Pulmonary:      Effort: Pulmonary effort is normal. No respiratory distress.      Breath sounds: Normal breath sounds. No stridor.   Abdominal:      General: There is no distension.   Musculoskeletal:         General: Normal range of motion.      Cervical back: Normal range of motion.   Skin:     Findings: No rash.   Neurological:      Mental Status: He is alert. Mental status is at baseline.   Psychiatric:         Mood and Affect: Mood normal.         Vital Signs  ED Triage Vitals [03/04/24 1531]   Temperature Pulse Respirations Blood Pressure SpO2   98.4 °F (36.9 °C) 83 18 123/89  100 %      Temp Source Heart Rate Source Patient Position - Orthostatic VS BP Location FiO2 (%)   Temporal Monitor Sitting Right arm --      Pain Score       No Pain           Vitals:    03/04/24 1531   BP: 123/89   Pulse: 83   Patient Position - Orthostatic VS: Sitting         Visual Acuity      ED Medications  Medications - No data to display    Diagnostic Studies  Results Reviewed       None                   No orders to display              Procedures  Procedures         ED Course                               SBIRT 20yo+      Flowsheet Row Most Recent Value   Initial Alcohol Screen: US AUDIT-C     1. How often do you have a drink containing alcohol? 0 Filed at: 03/04/2024 1533   2. How many drinks containing alcohol do you have on a typical day you are drinking?  0 Filed at: 03/04/2024 1533   3a. Male UNDER 65: How often do you have five or more drinks on one occasion? 0 Filed at: 03/04/2024 1533   3b. FEMALE Any Age, or MALE 65+: How often do you have 4 or more drinks on one occassion? 0 Filed at: 03/04/2024 1533   Audit-C Score 0 Filed at: 03/04/2024 1533   JOSE LUIS: How many times in the past year have you...    Used an illegal drug or used a prescription medication for non-medical reasons? Never Filed at: 03/04/2024 1533                      Medical Decision Making  Patient without evidence of acute anaphylaxis, angioedema or airway involvement.  Discussed use of antihistamines, completion of antifungal medication and outpatient follow-up with dentistry or primary care.      The patient (and any family present) verbalized understanding of the discharge instructions and warnings that would necessitate return to the Emergency Department.    All questions were answered prior to discharge.             Disposition  Final diagnoses:   Oral thrush   Lip swelling     Time reflects when diagnosis was documented in both MDM as applicable and the Disposition within this note       Time User Action Codes Description  Comment    3/4/2024  3:36 PM You Segal Add [B37.0] Oral thrush     3/4/2024  3:36 PM You Segal Add [R22.0] Lip swelling           ED Disposition       ED Disposition   Discharge    Condition   Stable    Date/Time   Mon Mar 4, 2024 1535    Comment   Bill Haji discharge to home/self care.                   Follow-up Information       Follow up With Specialties Details Why Contact Info    Maria T Turner, DO   As needed [address] [place] [address]  [address]  [facility] [address]  [phone]              Current Discharge Medication List        CONTINUE these medications which have NOT CHANGED    Details   amitriptyline (ELAVIL) 50 mg tablet Amitriptyline 50m tablet po at night PRN  Qty: 90 tablet, Refills: 0    Associated Diagnoses: Generalized anxiety disorder      DULoxetine (CYMBALTA) 60 mg delayed release capsule Duloxetine 60m capsule po twice daily  Qty: 60 capsule, Refills: 1    Comments: Void Duloxetine 60mg  Associated Diagnoses: Generalized anxiety disorder      risperiDONE (RisperDAL) 4 mg tablet Risperidone 4m tablet po night  Qty: 90 tablet, Refills: 0    Associated Diagnoses: Schizophrenia, unspecified type ([facility])      albuterol (PROVENTIL HFA,VENTOLIN HFA) 90 mcg/act inhaler Inhale 2 puffs daily as needed every 4 to 6 hours as needed for wheezing      alendronate (FOSAMAX) 70 mg tablet Take 70 mg by mouth once a week      Atrovent HFA 17 MCG/ACT inhaler Inhale 2 puffs 3 (three) times a day      buPROPion (Wellbutrin XL) 150 mg 24 hr tablet Bupropion ER 150m tablet po daily in the morning  Qty: 30 tablet, Refills: 1    Associated Diagnoses: Depression, unspecified depression type      fluticasone (FLONASE) 50 mcg/act nasal spray 2 sprays      loratadine (CLARITIN) 10 mg tablet Take 10 mg by mouth daily as needed      Trelegy Ellipta 100-62.5-25 MCG/ACT inhaler Inhale 1 puff in the morning             No discharge procedures on file.    PDMP Review       None            ED  Provider  Electronically Signed by             You Segal,   03/04/24 [id number]

## 2024-03-04 NOTE — DISCHARGE INSTRUCTIONS
You should the previously prescribed antifungal medication.  You can also take over-the-counter Benadryl as directed on the package for further symptom relief and to help with some of the swelling.    Return to the emergency department if you have worsening lip swelling, tongue swelling, tongue elevation.  Return if you have any difficulty breathing or swallowing.

## 2024-03-14 NOTE — PATIENT INSTRUCTIONS
Bill Haji [id number]   Thanks for presenting to today's Appointment at [facility]  Your medications were not changed

## 2024-03-14 NOTE — PSYCH
[facility]/Hospital: [facility] Outpatient Clinic/Non Addiction   [address] [facility] [address] [phone]    Psychiatric Progress Note  MRN#: [id number]  Bill Haji 59 y.o. male    This note was not shared with the patient due to reasonable likelihood of causing patient harm   _________________________________________________________________________________________________________________________________  OFFICE APPOINTMENT   Seen today at [facility] location                                                Patient Bill Haji ,1965   Prescriber/Physician: Leonard Ortiz DO Physician Location:   [facility] OUTPATIENT  [address] [facility] [address]     This service was provided in the office.  Patient is currently located in the [place], where I am  licensed.   Patient gave consent to proceed with encounter; acknowledge understanding of security and privacy of encounter   Patient identity was verified as well as the [facility] chart  Patient verbalized understanding evaluation only involves Psychiatric diagnosing, prescribing, result monitoring   Patient was informed this is a billable service and legal  ___________________________________________________________________________________________________________________________________     Reason for Visit:                        Chief Complaint   Patient presents with   • Depression     Subjective:    Bill Haji on familial stressors , uncertain regarding living situation, currently brother and girlfriend decided not to sell home and he's relieved.  Otherwise , he thinks about his mom  a lot , missing her but not to the point of tearfulness, his mom ( Bel )  ,  denies SI, HI, and he's able to perk up with taking a walk and listening to music, reading.    ROS:  Sleep -stable , restfulness   Psychosis denies  Depression;  defer to above  Energy; he reported as  his norm  Anxiety - denies  Irritability- denies  Ready to quit: Not Answered  Counseling given: Not Answered  Tobacco comments:  03/18/2024 Bill Haji , smoke 6-7 cigars daily. H/o cigarettes . Has cravings , h/o relapse after nicotine patch or gum trials, he thinks about stopping        Medication: Bill Haji is  Compliant Bupropion 150mg , Duloxetine  120mg and Risperidone 4mg , Amptrypline  50mg  Medication s/e: denies             Medical ROS:  Respiratory: Positive SOB , he has history of asthma and COPD  Cardiovascular: denies chest pain , palpaliation , heart racing   MSK ; Without muscle spasms, pain   He's on presidone taper due to yeast infection, chap lips and rash, he thinks that's cause of Trelegy   Pertinent items are noted in above all other symptoms are negative             Mental Status Evaluation:  General Appearance:  Bill Haji is a 59 y.o.  male casually dressed, looks stated age , Wearing glasses   Behavior:  cooperative, calm, appropriate eye contact    Speech:  normal for rate, rhythm, volume, latency, amount,   Mood:  Depressed    Affect:  Anxious to slightly limited      Thought Process:  concrete and logical    Thought Content:  no overt delusions, normal, ruminating thoughts   Perceptual Disturbances: denies auditory hallucinations when asked, denies visual hallucinations when asked. Does not appear responding or preoccupied  Delusions  w/o   Risk Potential: Suicidal Ideations w/o  Homicidal Ideations w/o  Potential for Aggression w/o   Sensorium:  Oriented to person, place ( [place], [place]), time/date ( March 2024) and situation   Memory:  recent and remote memory grossly intact   Consciousness:  alert and awake   Attention: attention span and concentration are appropriate   Insight:  fair    Judgment: Appropriate    Gait/Station: normal   Motor Activity: no abnormal movements,      There were no vitals filed for this visit.      Medications:   Current Outpatient Medications on File Prior to Visit   Medication Sig Dispense Refill   • amitriptyline (ELAVIL) 50 mg tablet Amitriptyline 50m tablet po at night PRN 90 tablet 0   • risperiDONE (RisperDAL) 4 mg tablet Risperidone 4m tablet po night 90 tablet 0   • albuterol (PROVENTIL HFA,VENTOLIN HFA) 90 mcg/act inhaler Inhale 2 puffs daily as needed every 4 to 6 hours as needed for wheezing     • alendronate (FOSAMAX) 70 mg tablet Take 70 mg by mouth once a week     • Atrovent HFA 17 MCG/ACT inhaler Inhale 2 puffs 3 (three) times a day     • buPROPion (Wellbutrin XL) 150 mg 24 hr tablet Bupropion ER 150m tablet po daily in the morning 30 tablet 1   • DULoxetine (CYMBALTA) 60 mg delayed release capsule Duloxetine 60m capsule po twice daily 60 capsule 1   • fluticasone (FLONASE) 50 mcg/act nasal spray 2 sprays     • loratadine (CLARITIN) 10 mg tablet Take 10 mg by mouth daily as needed     • Trelegy Ellipta 100-62.5-25 MCG/ACT inhaler Inhale 1 puff in the morning       No current facility-administered medications on file prior to visit.    Also :   •  Trelegy Ellipta 100-62.5-25 MCG/ACT inhaler, Inhale 1 puff in the morning, Disp: , Rfl:       Labs: I have personally reviewed all pertinent laboratory/tests results.   Admission Labs:   CBC:   Lab Results   Component Value Date    WBC 6.16 10/19/2023    RBC 4.68 10/19/2023    HGB 14.5 10/19/2023    HCT 44.8 10/19/2023    MCV 96 10/19/2023     10/19/2023    MCH 31.0 10/19/2023    MCHC 32.4 10/19/2023    RDW 12.5 10/19/2023    MPV 10.1 10/19/2023    NEUTROABS 4.30 10/19/2023     CMP:   Lab Results   Component Value Date    SODIUM 138 10/19/2023    K 5.0 10/19/2023     10/19/2023    CO2 28 10/19/2023    AGAP 5 10/19/2023    BUN 8 10/19/2023    CREATININE 0.75 10/19/2023    GLUC 102 2022    GLUF 92 10/19/2023    CALCIUM 9.6 10/19/2023    AST 21 10/19/2023    ALT 13 10/19/2023    ALKPHOS 48 10/19/2023    TP 6.8  10/19/2023    ALB 4.2 10/19/2023    TBILI 0.37 10/19/2023    EGFR 101 10/19/2023     Lipid Profile:   Lab Results   Component Value Date    CHOLESTEROL 144 10/19/2023    HDL 52 10/19/2023    TRIG 47 10/19/2023    LDLCALC 83 10/19/2023    NONHDLC 120 04/10/2023     Thyroid Studies:   Lab Results   Component Value Date    JAY4YDPFQBRV 3.075 10/19/2023           CT chest wo contrast     Impression   1. 6 mm noncalcified left upper lobe pulmonary nodule. Based on current Fleischner Society 2017 Guidelines on incidental pulmonary nodule, followup non-contrast CT is recommended at 6 months from the initial examination and, if stable at that time, an additional followup is recommended for 18-24 months from the initial examination.   2. Moderate emphysema. Mild coronary artery calcification. The study was marked in EPIC for significant notification, and marked for follow-up      ________________________________________________________________________    A/P  Bill Haji, is a 59 y.o.,  male, history of hospitalization for schizophrenia (auditory hallucinations , disorganized thoughts), Case complicated,  traumatizations  cause of problematic relationship w/ depressed mood; denies auditory hallucinations are associated. Interim events of depressed mood , cause of rumination , now without thoughts, although acute sadness in the setting of death of a family's members pet . Devoid of SI, plan or intent or other neurovegetative symptoms,  indicative of normal grief. Otherwise , slight increase of cigar while Bill Haji compliant to Bupropion 150mg         DSM5  1. Tobacco use disorder, moderate    2. Unspecified Depression    3. Generalized anxiety disorder    4. Trauma and stressor-related disorder    5. Schizophrenia, unspecified type (HCC)             PLAN:   History and external records reviewed. Discussed clinical findings and  diagnostic impression  Did not change other  medications    Medications  Prescribed During Encounter at [facility]:  -     buPROPion (Wellbutrin SR) 100 mg 12 hr tablet; Bupropion SR 100m tablet in the morning and 1 tablet in the afternoon( 7am, 12pm)  -     DULoxetine (CYMBALTA) 60 mg delayed release capsule; Duloxetine 60m capsule po twice daily       Medication List Also:   -     risperidONE (RisperDAL) 4 mg tablet; Risperidone 4m tablet po night  -     amitriptyline (ELAVIL) 50 mg tablet; Amitriptyline 50m tablet po at night PRN        Medications Discontinued During [facility] Encounter   Medication Reason   • buPROPion (Wellbutrin XL) 150 mg 24 hr tablet                   Treatement: Risks, benefits, and possible side effects of medications explained to patient and patient verbalizes understanding.                        [facility]   [facility] 3 months                     ________________________________________________________________________________________________________________________                    Patient Encounter : 10:15- 10: 38   Documenting: 10:39- 10:47                    Duration: Time Spent  23 mins with Patient.Greater than 50% of total time was spent with the patient         MDM  Number of Diagnoses or Management Options  Diagnosis management comments: 5       Amount and/or Complexity of Data Reviewed  Review and summarize past medical records: yes    Risk of Complications, Morbidity, and/or Mortality  Presenting problems: moderate  Diagnostic procedures: moderate  Management options: moderate         This note may have been written with the assistance of dictation software. Please excuse any grammatical  errors, misspellings,  and abnormal spacing of letters , sentences or paragraphs . For accurate interpretation should read note  horizontally

## 2024-03-18 ENCOUNTER — OFFICE VISIT (OUTPATIENT)
Dept: PSYCHIATRY | Facility: CLINIC | Age: 59
End: 2024-03-18
Payer: COMMERCIAL

## 2024-03-18 DIAGNOSIS — F20.9 SCHIZOPHRENIA, UNSPECIFIED TYPE (HCC): ICD-10-CM

## 2024-03-18 DIAGNOSIS — F41.1 GENERALIZED ANXIETY DISORDER: ICD-10-CM

## 2024-03-18 DIAGNOSIS — F32.A DEPRESSION, UNSPECIFIED DEPRESSION TYPE: ICD-10-CM

## 2024-03-18 DIAGNOSIS — F17.200 TOBACCO USE DISORDER, MODERATE, DEPENDENCE: Primary | ICD-10-CM

## 2024-03-18 DIAGNOSIS — F43.9 TRAUMA AND STRESSOR-RELATED DISORDER: ICD-10-CM

## 2024-03-18 PROCEDURE — 99214 OFFICE O/P EST MOD 30 MIN: CPT | Performed by: PSYCHIATRY & NEUROLOGY

## 2024-03-18 RX ORDER — DULOXETIN HYDROCHLORIDE 60 MG/1
CAPSULE, DELAYED RELEASE ORAL
Qty: 60 CAPSULE | Refills: 1 | Status: SHIPPED | OUTPATIENT
Start: 2024-03-18

## 2024-03-18 RX ORDER — BUPROPION HYDROCHLORIDE 100 MG/1
TABLET, EXTENDED RELEASE ORAL
Qty: 60 TABLET | Refills: 1 | Status: SHIPPED | OUTPATIENT
Start: 2024-03-18

## 2024-03-22 DIAGNOSIS — Z00.6 ENCOUNTER FOR EXAMINATION FOR NORMAL COMPARISON OR CONTROL IN CLINICAL RESEARCH PROGRAM: ICD-10-CM

## 2024-04-03 ENCOUNTER — TELEPHONE (OUTPATIENT)
Dept: PSYCHIATRY | Facility: CLINIC | Age: 59
End: 2024-04-03

## 2024-04-03 NOTE — TELEPHONE ENCOUNTER
Patient reporting allergy to bupropion-rash. States he went to dermatologist and the dermatologist said she was pretty sure it was the bupropion causing the rash. Forwarding to Dr. Ortiz for review.

## 2024-04-04 NOTE — TELEPHONE ENCOUNTER
Pt Bill Adithya, 1965, [facility] chart was reviewed    He reported on allergy symptoms since increase dose of Bupropion Xl 150mg to Bupropion SR 200mg ( rash diffuse face neck, arms stomach. Required interventions  ER visit , was started on another Prednisone taper after worsening symptoms to OTC allergen. Now reports less mouth swelling since stopping Bupropion, although with rash. Bill Haji has seen Derm was put on topical. Regarding prior reporting of Trelegy as the cause Bill Haji reported that was also stopped.    Discussed possible allergy to Bupropion or Trelegy as the cause and he was advise to not to restart Bupropion that was prescribed via [facility]       This note was not shared with the patient due to reasonable likelihood of causing patient harm       This note may have been written with the assistance of dictation software. Please excuse any grammatical  errors, misspellings,  and abnormal spacing of letters , sentences or paragraphs . For accurate interpretation should read note horizontally

## 2024-04-08 ENCOUNTER — APPOINTMENT (OUTPATIENT)
Dept: LAB | Facility: HOSPITAL | Age: 59
End: 2024-04-08
Payer: MEDICARE

## 2024-04-08 DIAGNOSIS — L40.0 PSORIASIS VULGARIS: ICD-10-CM

## 2024-04-08 LAB
ALBUMIN SERPL BCP-MCNC: 3.9 G/DL (ref 3.5–5)
ALP SERPL-CCNC: 47 U/L (ref 34–104)
ALT SERPL W P-5'-P-CCNC: 13 U/L (ref 7–52)
ANION GAP SERPL CALCULATED.3IONS-SCNC: 6 MMOL/L (ref 4–13)
AST SERPL W P-5'-P-CCNC: 15 U/L (ref 13–39)
BILIRUB SERPL-MCNC: 0.35 MG/DL (ref 0.2–1)
BUN SERPL-MCNC: 10 MG/DL (ref 5–25)
CALCIUM SERPL-MCNC: 9.2 MG/DL (ref 8.4–10.2)
CHLORIDE SERPL-SCNC: 104 MMOL/L (ref 96–108)
CO2 SERPL-SCNC: 28 MMOL/L (ref 21–32)
CREAT SERPL-MCNC: 0.89 MG/DL (ref 0.6–1.3)
GFR SERPL CREATININE-BSD FRML MDRD: 94 ML/MIN/1.73SQ M
GLUCOSE P FAST SERPL-MCNC: 116 MG/DL (ref 65–99)
POTASSIUM SERPL-SCNC: 4.2 MMOL/L (ref 3.5–5.3)
PROT SERPL-MCNC: 6.2 G/DL (ref 6.4–8.4)
SODIUM SERPL-SCNC: 138 MMOL/L (ref 135–147)

## 2024-04-08 PROCEDURE — 80053 COMPREHEN METABOLIC PANEL: CPT

## 2024-04-08 PROCEDURE — 36415 COLL VENOUS BLD VENIPUNCTURE: CPT

## 2024-04-08 PROCEDURE — 86480 TB TEST CELL IMMUN MEASURE: CPT

## 2024-04-09 LAB
GAMMA INTERFERON BACKGROUND BLD IA-ACNC: 0.05 IU/ML
M TB IFN-G BLD-IMP: NEGATIVE
M TB IFN-G CD4+ BCKGRND COR BLD-ACNC: 0.01 IU/ML
M TB IFN-G CD4+ BCKGRND COR BLD-ACNC: 0.01 IU/ML
MITOGEN IGNF BCKGRD COR BLD-ACNC: 9.95 IU/ML

## 2024-04-15 ENCOUNTER — SOCIAL WORK (OUTPATIENT)
Dept: BEHAVIORAL/MENTAL HEALTH CLINIC | Facility: CLINIC | Age: 59
End: 2024-04-15
Payer: COMMERCIAL

## 2024-04-15 DIAGNOSIS — F20.3 UNDIFFERENTIATED SCHIZOPHRENIA (HCC): Primary | ICD-10-CM

## 2024-04-15 DIAGNOSIS — F41.1 GENERALIZED ANXIETY DISORDER: ICD-10-CM

## 2024-04-15 PROCEDURE — 90837 PSYTX W PT 60 MINUTES: CPT | Performed by: COUNSELOR

## 2024-04-15 NOTE — BH TREATMENT PLAN
"Bill Haji  1965       Date of Initial Treatment Plan: see [facility] for past plans   Date of Current Treatment Plan: 04/15/24  Plan target date: 10/14/24  Plan expiration date: 10/14/24       Treatment Plan Number 1 in Epic     Strengths/Personal Resources for Self Care: Cooking, desire to be healthy, caring of loved ones, dedicated to family, pitch in and help when needed.    Diagnosis:   1. Undifferentiated schizophrenia (HCC)        2. Generalized anxiety disorder              Area of Needs: maintenance support to deal effectively with MH, Outlet to vent and manage life stress, Continue to build confidence and esteem.        Long Term Goal 1:  \"I want to feel happier in my life so I don't feel so depressed.  I like to have a place to talk about things that scare me, grouind my fears and build better skills to manage in healthier way.\".     Target Date: 10/14/24  Completion Date: 10/14/24         Short Term Objectives for Goal 1:  Maintenance support visits q 6 weeks with Gladis to be stable in face of life stress.    How will client know treatment is completed?  \"Able to cope with life stress without tx support.\".      Behavioral Health Treatment Plan [facility]: Diagnosis and Treatment Plan explained to Bill Khan relates understanding diagnosis and is agreeable to Treatment Plan.      Client Comments : Please share your thoughts, feelings, need and/or experiences regarding your treatment plan: Bill agreed to above listed tx plan.    "

## 2024-04-15 NOTE — PSYCH
"Behavioral Health Psychotherapy Progress Note    Psychotherapy Provided: Individual Psychotherapy     1. Undifferentiated schizophrenia (HCC)        2. Generalized anxiety disorder            Goals addressed in session: Goal 1     DATA:     Bill reports over the past four weeks having had thrush from Trelegy med for asthma/COPD, and a rash from the increased dose of Buproprion.  Ct says he went to ER and oral surgeon. \"Both my lips blew up, canker sores in mouth, Rash with blisters was on arms, stomach, neck and face.\".  Spoke with Dr. Valles.  Had stopped Buproprion about a month ago.  Had only been on increased Welbutrin for a couple of weeks.  Has had two rounds of prednisone.  Feeling better.  Has not noticed a worsening depression.  Psoraisis is reportedly under control.    Asked about hx of psychosis as we have not discussed in awhile.  Ct remineded me of his Hx of visual hallucination couple of times when mom was alive.  Sometimes he says he still hears his name being called.  Some social paranoia, not usually with family, but more with strangers..  Anxiety has settled down, as brother and Valentina have reconciled.  They are still engaged and now going to therapy.  Adriano (Valentina's son) is still living with them and plans to have a  coming to the house today.    Sleep is okay.  Taking other meds as rx'd.  Worries about brother and sister's health.  My brother had dental implant surgery and my sister had carpal tunnel surgery. Updated overdue tx plan.  Continue to build insights and coping.    During this session, this clinician used the following therapeutic modalities: Cognitive Behavioral Therapy, Dialectical Behavior Therapy, Mindfulness-based Strategies, Motivational Interviewing, and Supportive Psychotherapy    Substance Abuse was not addressed during this session. If the client is diagnosed with a co-occurring substance use disorder, please indicate any changes in the frequency or amount of use: " "NA. Stage of change for addressing substance use diagnoses: No substance use/Not applicable    ASSESSMENT:  Bill Haji presents with a Euthymic/ normal mood.     his affect is Normal range and intensity, which is congruent, with his mood and the content of the session. The client has made progress on their goals.    Bill Haji presents with a none risk of suicide, none risk of self-harm, and none risk of harm to others.    For any risk assessment that surpasses a \"low\" rating, a safety plan must be developed.    A safety plan was indicated: no  If yes, describe in detail NA    PLAN: Between sessions, Bill Haji will continue to work on above issues and goals.. At the next session, the therapist will use Cognitive Behavioral Therapy, Dialectical Behavior Therapy, Mindfulness-based Strategies, Motivational Interviewing, and Supportive Psychotherapy to address same..    Behavioral Health Treatment Plan and Discharge Planning: Bill Haji is aware of and agrees to continue to work on their treatment plan. They have identified and are working toward their discharge goals. yes    Visit start and stop times:    04/15/24  Start Time: 1005  Stop Time: 1100  Total Visit Time: 55 minutes  "

## 2024-04-17 ENCOUNTER — APPOINTMENT (OUTPATIENT)
Dept: LAB | Facility: HOSPITAL | Age: 59
End: 2024-04-17
Payer: MEDICARE

## 2024-04-17 DIAGNOSIS — Z00.6 ENCOUNTER FOR EXAMINATION FOR NORMAL COMPARISON OR CONTROL IN CLINICAL RESEARCH PROGRAM: ICD-10-CM

## 2024-04-17 LAB
BASOPHILS # BLD AUTO: 0.03 THOUSANDS/ÂΜL (ref 0–0.1)
BASOPHILS NFR BLD AUTO: 1 % (ref 0–1)
EOSINOPHIL # BLD AUTO: 0.15 THOUSAND/ÂΜL (ref 0–0.61)
EOSINOPHIL NFR BLD AUTO: 3 % (ref 0–6)
ERYTHROCYTE [DISTWIDTH] IN BLOOD BY AUTOMATED COUNT: 12.3 % (ref 11.6–15.1)
HCT VFR BLD AUTO: 43.9 % (ref 36.5–49.3)
HGB BLD-MCNC: 14 G/DL (ref 12–17)
IMM GRANULOCYTES # BLD AUTO: 0.03 THOUSAND/UL (ref 0–0.2)
IMM GRANULOCYTES NFR BLD AUTO: 1 % (ref 0–2)
LYMPHOCYTES # BLD AUTO: 2.05 THOUSANDS/ÂΜL (ref 0.6–4.47)
LYMPHOCYTES NFR BLD AUTO: 39 % (ref 14–44)
MCH RBC QN AUTO: 30 PG (ref 26.8–34.3)
MCHC RBC AUTO-ENTMCNC: 31.9 G/DL (ref 31.4–37.4)
MCV RBC AUTO: 94 FL (ref 82–98)
MONOCYTES # BLD AUTO: 0.39 THOUSAND/ÂΜL (ref 0.17–1.22)
MONOCYTES NFR BLD AUTO: 8 % (ref 4–12)
NEUTROPHILS # BLD AUTO: 2.56 THOUSANDS/ÂΜL (ref 1.85–7.62)
NEUTS SEG NFR BLD AUTO: 48 % (ref 43–75)
NRBC BLD AUTO-RTO: 0 /100 WBCS
PLATELET # BLD AUTO: 255 THOUSANDS/UL (ref 149–390)
PMV BLD AUTO: 9.6 FL (ref 8.9–12.7)
RBC # BLD AUTO: 4.67 MILLION/UL (ref 3.88–5.62)
WBC # BLD AUTO: 5.21 THOUSAND/UL (ref 4.31–10.16)

## 2024-04-17 PROCEDURE — 85025 COMPLETE CBC W/AUTO DIFF WBC: CPT

## 2024-04-17 PROCEDURE — 36415 COLL VENOUS BLD VENIPUNCTURE: CPT

## 2024-04-29 NOTE — PATIENT INSTRUCTIONS
Bill Haji [id number]   Thanks for presenting to your [facility] appointment   Risperidone was increased : 1 mg tablet in the morning and 4 mg tablet at night   Your other medications were not changed

## 2024-04-29 NOTE — PSYCH
[facility]/Hospital: [facility] Outpatient Clinic/Non Addiction   [address] [facility] [address] [phone]    Psychiatric Progress Note  MRN#: [id number]  Bill Haji 59 y.o. male    This note was not shared with the patient due to reasonable likelihood of causing patient harm   _________________________________________________________________________________________________________________________________  OFFICE APPOINTMENT   Seen today at [facility] location                                                Patient Bill Haji male ,1965   Prescriber/Physician: Leonard Ortiz DO (she/her) Physician Location:   [facility] OUTPATIENT  [address] [address]     This service was provided in the office.  Patient is currently located in the [place], where I am  licensed.   Patient gave consent to proceed with encounter; acknowledge understanding of security and privacy of encounter   [facility]  Patient verbalized understanding evaluation only involves Psychiatric diagnosing, prescribing, result monitoring   Patient was informed this is a billable service and legal  ___________________________________________________________________________________________________________________________________     Reason for Visit:                        Chief Complaint   Patient presents with   • Psychosis     Subjective:  Interim event : Bill Haji tried  Bupropion Xl 150mg to Bupropion SR 200mg , had rash, had to go to ER , completed   Prednisone taper , was told allergy , and call [facility] was instructed to stop Bupropion    Currently he complained of light rash since stopping Bupropion and itching , without swelling or , small rash on forearm and neck .Hes able to sleep thru the night      Otherwise there anxiety about possible pending move to [facility] , also Bill Haji  complained familial strife and associated pressure on him  Note :he has 60mo services (commuting)       ROS:  Sleep -normal  Psychosis :  has paranoia associated nervous, report nervous about his dog , paranoia in the home  about brother and girlfriend, leads to clumsiness, without recent hallucination  Depression; slight if thinking about his mom  Si/hi- denies  Anxiety : defer to above   Irritability- denies     Ready to quit: Not Answered  Counseling given: Not Answered  Tobacco comments: 04/30/24 Bill Haji , smoke 6 cigars daily. Stated he cut down as much as he could now   Bill Haji last smoke marijuana on Sunday, smokes occassionally       Medication: Bill Haji is complaint to Risperidone 4mg,  Duloxetine  120mg and Amptrypline  50mg  Medication s/e: denies ,wihtout rash since stopping Bupropion             Medical ROS:  Cardiovascular: denies chest pain , palpaliation , heart racing   MSK ; Without muscle spasms, pain   Neuro: denies tremors, syncope, dizziness   Other Pertinent items are noted in above all other symptoms are negative         Allergies   Allergen Reactions   • Bupropion Itching and Rash   • Oxcarbazepine Rash and Hives     SJS after 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  male casually dressed, looks stated age , Wearing glasses   Behavior:  cooperative, calm, appropriate eye gaze    Speech:  normal for rate, rhythm, volume, latency, amount,   Mood:  Depressed    Affect:  Anxious       Thought Process:  concrete, disorganized, and logical    Thought Content:  paranoid ideation, negative thinking, ruminating thoughts   Perceptual Disturbances: denies auditory hallucinations when asked, denies visual hallucinations when asked. Does not appear responding or preoccupied  Delusions  w/o   Risk Potential: Suicidal  Ideations w/o  Homicidal Ideations w/o  Potential for Aggression w/o   Sensorium:  Oriented to person, place ( [facility],) time/date ( 2024) and situation   Memory:  recent and remote memory grossly intact   Consciousness:  alert and awake   Attention: attention span and concentration are appropriate   Insight:  Limited    Judgment: Appropriate    Gait/Station: normal   Motor Activity: no abnormal movements,      There were no vitals filed for this visit.     Medications:   Current Outpatient Medications on File Prior to Visit   Medication Sig Dispense Refill   • amitriptyline (ELAVIL) 50 mg tablet Amitriptyline 50m tablet po at night PRN 90 tablet 0   • risperiDONE (RisperDAL) 4 mg tablet Risperidone 4m tablet po night 90 tablet 0   • albuterol (PROVENTIL HFA,VENTOLIN HFA) 90 mcg/act inhaler Inhale 2 puffs daily as needed every 4 to 6 hours as needed for wheezing     • alendronate (FOSAMAX) 70 mg tablet Take 70 mg by mouth once a week     • Atrovent HFA 17 MCG/ACT inhaler Inhale 2 puffs 3 (three) times a day     • buPROPion (Wellbutrin SR) 100 mg 12 hr tablet Bupropion SR 100m tablet in the morning and 1 tablet in the afternoon( 7am, 12pm) 60 tablet 1   • DULoxetine (CYMBALTA) 60 mg delayed release capsule Duloxetine 60m capsule po twice daily 60 capsule 1   • fluticasone (FLONASE) 50 mcg/act nasal spray 2 sprays     • loratadine (CLARITIN) 10 mg tablet Take 10 mg by mouth daily as needed     • Trelegy Ellipta 100-62.5-25 MCG/ACT inhaler Inhale 1 puff in the morning       No current facility-administered medications on file prior to visit.    Also :   •  Trelegy Ellipta 100-62.5-25 MCG/ACT inhaler, Inhale 1 puff in the morning, Disp: , Rfl:       Labs: I have personally reviewed all pertinent laboratory/tests results.   Admission Labs:   CBC:   Lab Results   Component Value Date    WBC 5.21 2024    RBC 4.67 2024    HGB 14.0 2024    HCT 43.9 2024    MCV 94  04/17/2024     04/17/2024    MCH 30.0 04/17/2024    MCHC 31.9 04/17/2024    RDW 12.3 04/17/2024    MPV 9.6 04/17/2024    NEUTROABS 2.56 04/17/2024     CMP:   Lab Results   Component Value Date    SODIUM 138 04/08/2024    K 4.2 04/08/2024     04/08/2024    CO2 28 04/08/2024    AGAP 6 04/08/2024    BUN 10 04/08/2024    CREATININE 0.89 04/08/2024    GLUC 102 12/04/2022    GLUF 116 (H) 04/08/2024    CALCIUM 9.2 04/08/2024    AST 15 04/08/2024    ALT 13 04/08/2024    ALKPHOS 47 04/08/2024    TP 6.2 (L) 04/08/2024    ALB 3.9 04/08/2024    TBILI 0.35 04/08/2024    EGFR 94 04/08/2024     Lipid Profile:   Lab Results   Component Value Date    CHOLESTEROL 144 10/19/2023    HDL 52 10/19/2023    TRIG 47 10/19/2023    LDLCALC 83 10/19/2023    NONHDLC 120 04/10/2023     Thyroid Studies:   Lab Results   Component Value Date    FRO3WPTPALZJ 3.075 10/19/2023           CT chest wo contrast     Impression   1. 6 mm noncalcified left upper lobe pulmonary nodule. Based on current Fleischner Society 2017 Guidelines on incidental pulmonary nodule, followup non-contrast CT is recommended at 6 months from the initial examination and, if stable at that time, an additional followup is recommended for 18-24 months from the initial examination.   2. Moderate emphysema. Mild coronary artery calcification. The study was marked in EPIC for significant notification, and marked for follow-up      ________________________________________________________________________    A/P  Bill Haji, is a 59 y.o.,  male, history of hospitalization for schizophrenia (auditory hallucinations , disorganized thoughts), Case complicated,  traumatizations  cause of problematic relationship w/ depressed mood; denies auditory hallucinations are associated. Interim events of depressed mood , linked to missing his mom, otherwise now with noted anxiety , paranoia, presents psychotic , although he was vague with details. There patterns of  perseverance about the brother and girlfriend and associated worries , non logically sound. Risk factors, he has habitual smoking of marijuana , also nicotine, recently stopped bupropion, now devoid of rash allergy symptoms.             DSM5  1. Schizophrenia    2. Generalized anxiety disorder    3. Tobacco use disorder, moderate, dependence    4. Depression, unspecified depression type    5. Trauma and stressor-related disorder          PLAN:   History and external records reviewed. Discussed clinical findings and  diagnostic impression  Labs 24 WNL CBC diff, CMP,            10/2023 WNL Lipid panel   Risperidone: increased to 1mg in the morning , 4 mg at night   Did not change other medications    Medications Prescribed During Encounter at [facility]:    -     risperiDONE (RisperDAL) 1 mg tablet; Risperidone 1 mg : 1 tablet in the morning  -     risperiDONE (RisperDAL) 4 mg tablet; Risperidone 4m tablet po night  -     DULoxetine (CYMBALTA) 60 mg delayed release capsule; Duloxetine 60m capsule po twice daily  -     amitriptyline (ELAVIL) 50 mg tablet; Amitriptyline 50m tablet po at night PRN             Medications Discontinued During This Encounter   Medication Reason   • buPROPion (Wellbutrin SR) 100 mg 12 hr tablet                      Treatement: Risks, benefits, and possible side effects of medications explained to patient and patient verbalizes understanding.                        [facility]   [facility]                     ________________________________________________________________________________________________________________________                    Patient Encounter : 10:31 - 11:03                                                  Duration: Time Spent  32 mins with Patient.Greater than 50% of total time was spent with the patient         MDM  Number of Diagnoses or Management Options  Diagnosis management comments: 5       Amount and/or Complexity of  Data Reviewed  Clinical lab tests: reviewed  Review and summarize past medical records: yes    Risk of Complications, Morbidity, and/or Mortality  Presenting problems: moderate  Diagnostic procedures: moderate  Management options: moderate         This note may have been written with the assistance of dictation software. Please excuse any grammatical  errors, misspellings,  and abnormal spacing of letters , sentences or paragraphs . For accurate interpretation should read note  horizontally

## 2024-04-30 ENCOUNTER — OFFICE VISIT (OUTPATIENT)
Dept: PSYCHIATRY | Facility: CLINIC | Age: 59
End: 2024-04-30
Payer: COMMERCIAL

## 2024-04-30 DIAGNOSIS — F20.9 SCHIZOPHRENIA, UNSPECIFIED TYPE (HCC): Primary | ICD-10-CM

## 2024-04-30 DIAGNOSIS — F41.1 GENERALIZED ANXIETY DISORDER: ICD-10-CM

## 2024-04-30 DIAGNOSIS — F43.9 TRAUMA AND STRESSOR-RELATED DISORDER: ICD-10-CM

## 2024-04-30 DIAGNOSIS — F32.A DEPRESSION, UNSPECIFIED DEPRESSION TYPE: ICD-10-CM

## 2024-04-30 DIAGNOSIS — F17.200 TOBACCO USE DISORDER, MODERATE, DEPENDENCE: ICD-10-CM

## 2024-04-30 PROCEDURE — 99214 OFFICE O/P EST MOD 30 MIN: CPT | Performed by: PSYCHIATRY & NEUROLOGY

## 2024-04-30 RX ORDER — AMITRIPTYLINE HYDROCHLORIDE 50 MG/1
TABLET, FILM COATED ORAL
Qty: 90 TABLET | Refills: 0 | Status: SHIPPED | OUTPATIENT
Start: 2024-04-30

## 2024-04-30 RX ORDER — RISPERIDONE 4 MG/1
TABLET ORAL
Qty: 90 TABLET | Refills: 0 | Status: SHIPPED | OUTPATIENT
Start: 2024-04-30

## 2024-04-30 RX ORDER — RISPERIDONE 1 MG/1
TABLET ORAL
Qty: 30 TABLET | Refills: 1 | Status: SHIPPED | OUTPATIENT
Start: 2024-04-30

## 2024-04-30 RX ORDER — DULOXETIN HYDROCHLORIDE 60 MG/1
CAPSULE, DELAYED RELEASE ORAL
Qty: 60 CAPSULE | Refills: 1 | Status: SHIPPED | OUTPATIENT
Start: 2024-04-30

## 2024-05-05 LAB
APOB+LDLR+PCSK9 GENE MUT ANL BLD/T: NOT DETECTED
BRCA1+BRCA2 DEL+DUP + FULL MUT ANL BLD/T: NOT DETECTED
MLH1+MSH2+MSH6+PMS2 GN DEL+DUP+FUL M: NOT DETECTED

## 2024-05-13 ENCOUNTER — TELEPHONE (OUTPATIENT)
Dept: PSYCHIATRY | Facility: CLINIC | Age: 59
End: 2024-05-13

## 2024-06-14 ENCOUNTER — OFFICE VISIT (OUTPATIENT)
Dept: PSYCHIATRY | Facility: CLINIC | Age: 59
End: 2024-06-14
Payer: COMMERCIAL

## 2024-06-14 DIAGNOSIS — F43.9 TRAUMA AND STRESSOR-RELATED DISORDER: ICD-10-CM

## 2024-06-14 DIAGNOSIS — F17.200 TOBACCO USE DISORDER, MODERATE, DEPENDENCE: ICD-10-CM

## 2024-06-14 DIAGNOSIS — F32.A DEPRESSION, UNSPECIFIED DEPRESSION TYPE: Primary | ICD-10-CM

## 2024-06-14 DIAGNOSIS — F41.1 GENERALIZED ANXIETY DISORDER: ICD-10-CM

## 2024-06-14 DIAGNOSIS — F20.9 SCHIZOPHRENIA, UNSPECIFIED TYPE (HCC): ICD-10-CM

## 2024-06-14 PROCEDURE — 99214 OFFICE O/P EST MOD 30 MIN: CPT | Performed by: PSYCHIATRY & NEUROLOGY

## 2024-06-14 RX ORDER — RISPERIDONE 4 MG/1
TABLET ORAL
Qty: 90 TABLET | Refills: 0 | Status: SHIPPED | OUTPATIENT
Start: 2024-06-14

## 2024-06-14 RX ORDER — DULOXETIN HYDROCHLORIDE 60 MG/1
CAPSULE, DELAYED RELEASE ORAL
Qty: 60 CAPSULE | Refills: 2 | Status: SHIPPED | OUTPATIENT
Start: 2024-06-14

## 2024-06-14 RX ORDER — AMITRIPTYLINE HYDROCHLORIDE 50 MG/1
TABLET, FILM COATED ORAL
Qty: 90 TABLET | Refills: 0 | Status: SHIPPED | OUTPATIENT
Start: 2024-06-14

## 2024-06-14 RX ORDER — RISPERIDONE 1 MG/1
TABLET ORAL
Qty: 90 TABLET | Refills: 0 | Status: SHIPPED | OUTPATIENT
Start: 2024-06-14

## 2024-06-14 NOTE — PATIENT INSTRUCTIONS
iBll Haji [id number]   Thanks for presenting to your [facility] appointment   Your medications were not changed

## 2024-06-14 NOTE — PSYCH
[facility]/Hospital: [facility] Outpatient Clinic/Non Addiction   [address] [place] [address] [phone]    Psychiatric Progress Note  MRN#: [id number]  Bill Haji 59 y.o. male    This note was not shared with the patient due to reasonable likelihood of causing patient harm   _________________________________________________________________________________________________________________________________  OFFICE APPOINTMENT   Seen today at [facility] location                                                Patient Bill Haij male ,1965   Prescriber/Physician: Leonard Ortiz DO (she/her) Physician Location:   [facility] OUTPATIENT  [address] [facility] [facility] [address]     This service was provided in the office.  Patient is currently located in the [place], where I am  licensed.   Patient gave consent to proceed with encounter; acknowledge understanding of security and privacy of encounter   [facility] identity [facility]  Patient verbalized understanding evaluation only involves Psychiatric diagnosing, prescribing, result monitoring   Patient was informed this is a billable service and legal  ___________________________________________________________________________________________________________________________________     Reason for Visit:                        Chief Complaint   Patient presents with   • Depression     Subjective:  Bill Haji some sad , dog   Otherwise he increased Risperidone , less thoughts, less rumination his brother and brothers  girlfriend.   Denies hallucinations, delusions     ROS:  Sleep - 7-8hrs   Psychosis :  denies   Depression; defer to above   Si/hi-  denies       Ready to quit: Not Answered  Counseling given: Not Answered  Tobacco comments: 24 [address]ithya , smoke 6 cigars daily. Stated he cut down as much as he could now   Bill Haji  last smoke marijuana on Sunday, smokes occassionally       Medication: Bill Haji is complaint to Risperidone 1mg in morning 4mg,  Duloxetine  60mg x 2 ( morning and 5 pm) and Amptrypline  50mg  Medication s/e: denies ,wihtout rash since stopping Bupropion     Social History     Tobacco Use   • Smoking status: Every Day     Types: Cigars   • Smokeless tobacco: Never   • Tobacco comments:     06/14/24 Bill Haji , smoke 6-7 cigars daily.    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  male casually dressed, looks stated age , Wearing glasses   Behavior:  cooperative, calm, appropriate eye gaze    Speech:  normal for rate, rhythm, volume, latency, amount,   Mood:  Sad   Affect:  Normal      Thought Process:  concrete, goal directed, and logical    Thought Content:  no overt delusions, normal   Perceptual Disturbances: denies auditory hallucinations when asked, denies visual hallucinations when asked. Does not appear responding or preoccupied  Delusions  w/o   Risk Potential: Suicidal Ideations w/o  Homicidal Ideations w/o  Potential for Aggression w/o   Sensorium:  Oriented to person, place ( [facility],) time/date ( June 2024) and situation   Memory:  recent and remote memory grossly intact   Consciousness:  alert and awake   Attention: attention span and concentration are appropriate   Insight:  Fair    Judgment: Appropriate     Gait/Station: normal   Motor Activity: no abnormal movements,      There were no vitals filed for this visit.     Medications:   Current Outpatient Medications on File Prior to Visit   Medication Sig Dispense Refill   • albuterol (PROVENTIL HFA,VENTOLIN HFA) 90 mcg/act inhaler Inhale 2 puffs daily as needed every 4 to 6 hours as needed for wheezing     • alendronate (FOSAMAX) 70 mg tablet Take 70 mg by mouth once a week     • amitriptyline (ELAVIL) 50 mg tablet Amitriptyline 50m tablet po at night PRN 90 tablet 0   • Atrovent HFA 17 MCG/ACT inhaler Inhale 2 puffs 3 (three) times a day     • DULoxetine (CYMBALTA) 60 mg delayed release capsule Duloxetine 60m capsule po twice daily 60 capsule 1   • fluticasone (FLONASE) 50 mcg/act nasal spray 2 sprays     • loratadine (CLARITIN) 10 mg tablet Take 10 mg by mouth daily as needed     • risperiDONE (RisperDAL) 1 mg tablet Risperidone 1 mg : 1 tablet in the morning 30 tablet 1   • risperiDONE (RisperDAL) 4 mg tablet Risperidone 4m tablet po night 90 tablet 0   • Trelegy Ellipta 100-62.5-25 MCG/ACT inhaler Inhale 1 puff in the morning       No current facility-administered medications on file prior to visit.    Also :   •  Trelegy Ellipta 100-62.5-25 MCG/ACT inhaler, Inhale 1 puff in the morning, Disp: , Rfl:       Labs: I have personally reviewed all pertinent laboratory/tests results.   Admission Labs:   CBC:   Lab Results   Component Value Date    WBC 5.21 2024    RBC 4.67 2024    HGB 14.0 2024    HCT 43.9 2024    MCV 94 2024     2024    MCH 30.0 2024    MCHC 31.9 2024    RDW 12.3 2024    MPV 9.6 2024    NEUTROABS 2.56 2024     CMP:   Lab Results   Component Value Date    SODIUM 138 2024    K 4.2 2024     2024    CO2 28 2024    AGAP 6 2024    BUN 10 2024    CREATININE 0.89 2024    GLUC 102 2022    GLUF 116 (H) 2024    CALCIUM  9.2 04/08/2024    AST 15 04/08/2024    ALT 13 04/08/2024    ALKPHOS 47 04/08/2024    TP 6.2 (L) 04/08/2024    ALB 3.9 04/08/2024    TBILI 0.35 04/08/2024    EGFR 94 04/08/2024     Lipid Profile:   Lab Results   Component Value Date    CHOLESTEROL 144 10/19/2023    HDL 52 10/19/2023    TRIG 47 10/19/2023    LDLCALC 83 10/19/2023    NONHDLC 120 04/10/2023     Thyroid Studies:   Lab Results   Component Value Date    KQS4KOCLKTLL 3.075 10/19/2023           CT chest wo contrast     Impression   1. 6 mm noncalcified left upper lobe pulmonary nodule. Based on current Fleischner Society 2017 Guidelines on incidental pulmonary nodule, followup non-contrast CT is recommended at 6 months from the initial examination and, if stable at that time, an additional followup is recommended for 18-24 months from the initial examination.   2. Moderate emphysema. Mild coronary artery calcification. The study was marked in EPIC for significant notification, and marked for follow-up    Labs 04/08/24 WNL CBC diff, CMP,            10/2023 WNL Lipid panel  ________________________________________________________________________    A/P  Bill Haji, is a 59 y.o.,  male, history of hospitalization for schizophrenia (auditory hallucinations , disorganized thoughts), Case complicated,  traumatizations  cause of problematic relationship w/ depressed mood; denies auditory hallucinations are associated. Interim events of depressed mood , linked to missing his mom, otherwise now with noted anxiety , paranoia, presents psychotic , although Risk factors, he has habitual smoking of marijuana . Currently with slight grieft s/p death of his dog, otherwise devoid of psychosis since higher dose Risperidone 5mg x 2 months.     DSM5  1. Depression, unspecified depression type    2. Generalized anxiety disorder    3. Schizophrenia    4. Tobacco use disorder, moderate, dependence    5. Trauma and stressor-related disorder            PLAN:   History  and external records reviewed. Discussed clinical findings and  diagnostic impression. Improving  Did not change other medications    Medications Prescribed During Encounter at [facility]:      -     amitriptyline (ELAVIL) 50 mg tablet; Amitriptyline 50m tablet po at night PRN  -     DULoxetine (CYMBALTA) 60 mg delayed release capsule; Duloxetine 60m capsule po twice daily  -     risperiDONE (RisperDAL) 4 mg tablet; Risperidone 4m tablet po night  -     risperiDONE (RisperDAL) 1 mg tablet; Risperidone 1 mg : 1 tablet in the morning               Treatement: Risks, benefits, and possible side effects of medications explained to patient and patient verbalizes understanding.                        [facility]   [facility]                     ________________________________________________________________________________________________________________________                    Patient Encounter Duration:    Start Time: 10:30 AM  Stop Time: 10:45 AM  Documenting:   Total Encounter Time :  15 minutes  was  spent with the Patient. Greater than 50% of total time was spent with the patient       MDM  Number of Diagnoses or Management Options  Diagnosis management comments: 5       Amount and/or Complexity of Data Reviewed  Review and summarize past medical records: yes    Risk of Complications, Morbidity, and/or Mortality  Presenting problems: low  Diagnostic procedures: moderate  Management options: moderate         This note may have been written with the assistance of dictation software. Please excuse any grammatical  errors, misspellings,  and abnormal spacing of letters , sentences or   paragraphs . For accurate interpretation should read note horizontally

## 2024-07-19 ENCOUNTER — TELEPHONE (OUTPATIENT)
Dept: PSYCHIATRY | Facility: CLINIC | Age: 59
End: 2024-07-19

## 2024-07-19 NOTE — TELEPHONE ENCOUNTER
Writer called and left voicemail for client to inform them that Dr. Ortiz has left the practice and cancelled scheduled appointment with Dr. Ortiz. Writer told client they can call the access center with any medication needs and let client know that intake will call them in regards to a transfer.

## 2024-07-30 DIAGNOSIS — F41.1 GENERALIZED ANXIETY DISORDER: ICD-10-CM

## 2024-07-30 NOTE — TELEPHONE ENCOUNTER
Reason for call:   [x] Refill   [] Prior Auth  [] Other:     Office:   [] PCP/Provider -   [x] Specialty/Provider - psych /Leonard Ortiz,      Medication:amitriptyline (ELAVIL) 50 mg tablet     Dose/Frequency: 1 tablet po at night PRN     Quantity: 90    Pharmacy: [facility]/pharmacy #[id number] - [place] - [address] [phone]     Does the patient have enough for 3 days?   [] Yes   [x] No - Send as HP to POD

## 2024-07-31 RX ORDER — AMITRIPTYLINE HYDROCHLORIDE 50 MG/1
TABLET, FILM COATED ORAL
Qty: 90 TABLET | Refills: 0 | Status: SHIPPED | OUTPATIENT
Start: 2024-07-31

## 2024-08-05 ENCOUNTER — SOCIAL WORK (OUTPATIENT)
Dept: BEHAVIORAL/MENTAL HEALTH CLINIC | Facility: CLINIC | Age: 59
End: 2024-08-05
Payer: COMMERCIAL

## 2024-08-05 DIAGNOSIS — F20.3 UNDIFFERENTIATED SCHIZOPHRENIA (HCC): Primary | ICD-10-CM

## 2024-08-05 DIAGNOSIS — F17.200 TOBACCO USE DISORDER, MODERATE, DEPENDENCE: ICD-10-CM

## 2024-08-05 DIAGNOSIS — F41.1 GENERALIZED ANXIETY DISORDER: ICD-10-CM

## 2024-08-05 PROCEDURE — 90837 PSYTX W PT 60 MINUTES: CPT | Performed by: COUNSELOR

## 2024-08-05 NOTE — PSYCH
"Behavioral Health Psychotherapy Progress Note    Psychotherapy Provided: Individual Psychotherapy     1. Undifferentiated schizophrenia (HCC)        2. Generalized anxiety disorder        3. Tobacco use disorder, moderate, dependence            Goals addressed in session: Goal 1     DATA:     Bill shared that his beloved pet dog, and \"child\" Cherise had to be euthanized.  He was calm and said he was doing okay.  Would like to get another dog, but his brother / GF Valentina also lost their dog, so they want to take a break from having another dog in the HH at this time.  Ct. Accepting of this boundary.  Processed grief, self care, outlets and coping. Current health okay.  He has COPD, cut back further on tobacco use.  Ct. Says current weight is 111     During this session, this clinician used the following therapeutic modalities: Cognitive Behavioral Therapy, Dialectical Behavior Therapy, Mindfulness-based Strategies, Motivational Interviewing, and Supportive Psychotherapy    Substance Abuse was addressed during this session. If the client is diagnosed with a co-occurring substance use disorder, please indicate any changes in the frequency or amount of use: Discussed having cut back further on smoking.  Continue to encourage cessation through MI techniques due to present health issues - COPD, Hx of cancer in family.. Stage of change for addressing substance use diagnoses: Contemplation    ASSESSMENT:  Bill Haji presents with a Euthymic/ normal mood.     his affect is Normal range and intensity, which is congruent, with his mood and the content of the session. The client has made progress on their goals.    Bill Haji presents with a none risk of suicide, none risk of self-harm, and none risk of harm to others.    For any risk assessment that surpasses a \"low\" rating, a safety plan must be developed.    A safety plan was indicated: no  If yes, describe in detail NA     PLAN: Between sessions, Bill Haji " will continue to work on above issues and tx goal.. At the next session, the therapist will use Cognitive Behavioral Therapy, Dialectical Behavior Therapy, Mindfulness-based Strategies, Motivational Interviewing, and Supportive Psychotherapy to address same.    Behavioral Health Treatment Plan and Discharge Planning: Bill Adithya is aware of and agrees to continue to work on their treatment plan. They have identified and are working toward their discharge goals. yes    Visit start and stop times:    8/5/24  Start Time: 1004  Stop Time: 1101  Total Visit Time: 57 minutes

## 2024-09-20 ENCOUNTER — TELEPHONE (OUTPATIENT)
Dept: PSYCHIATRY | Facility: CLINIC | Age: 59
End: 2024-09-20

## 2024-10-07 DIAGNOSIS — F41.1 GENERALIZED ANXIETY DISORDER: ICD-10-CM

## 2024-10-07 DIAGNOSIS — F20.9 SCHIZOPHRENIA, UNSPECIFIED TYPE (HCC): ICD-10-CM

## 2024-10-07 RX ORDER — DULOXETIN HYDROCHLORIDE 60 MG/1
CAPSULE, DELAYED RELEASE ORAL
Qty: 60 CAPSULE | Refills: 2 | Status: SHIPPED | OUTPATIENT
Start: 2024-10-07

## 2024-10-07 RX ORDER — RISPERIDONE 4 MG/1
TABLET ORAL
Qty: 90 TABLET | Refills: 0 | Status: SHIPPED | OUTPATIENT
Start: 2024-10-07

## 2024-10-07 RX ORDER — RISPERIDONE 1 MG/1
TABLET ORAL
Qty: 90 TABLET | Refills: 0 | Status: SHIPPED | OUTPATIENT
Start: 2024-10-07

## 2024-10-07 NOTE — TELEPHONE ENCOUNTER
Reason for call:   [x] Refill   [] Prior Auth  [] Other:     Office:   [x] PCP/Provider - [facility] SHERRIEOP / NATALIE ThompsonC   [] Specialty/Provider -     Medication:   DULoxetine (CYMBALTA) 60 mg delayed release capsule    Duloxetine 60m capsule po twice daily     60 capsules + 2 refills    risperiDONE (RisperDAL) 1 mg tablet    Risperidone 1 mg : 1 tablet in the morning     90 tablets    risperiDONE (RisperDAL) 4 mg tablet    Risperidone 4m tablet po night     90 tablets    Pharmacy: [facility]/pharmacy #[id number] - [place] - [address]     Does the patient have enough for 3 days?   [] Yes   [x] No - Send as HP to POD

## 2024-10-08 ENCOUNTER — APPOINTMENT (OUTPATIENT)
Dept: LAB | Facility: HOSPITAL | Age: 59
End: 2024-10-08
Payer: MEDICARE

## 2024-10-08 DIAGNOSIS — R63.4 LOSS OF WEIGHT: ICD-10-CM

## 2024-10-08 DIAGNOSIS — R91.1 COIN LESION: ICD-10-CM

## 2024-10-08 DIAGNOSIS — F31.61 MILD MIXED BIPOLAR I DISORDER (HCC): ICD-10-CM

## 2024-10-08 DIAGNOSIS — J43.9 EMPHYSEMATOUS BLEB (HCC): ICD-10-CM

## 2024-10-08 LAB
ALBUMIN SERPL BCG-MCNC: 4.1 G/DL (ref 3.5–5)
ALP SERPL-CCNC: 55 U/L (ref 34–104)
ALT SERPL W P-5'-P-CCNC: 16 U/L (ref 7–52)
ANION GAP SERPL CALCULATED.3IONS-SCNC: 4 MMOL/L (ref 4–13)
AST SERPL W P-5'-P-CCNC: 18 U/L (ref 13–39)
BASOPHILS # BLD AUTO: 0.03 THOUSANDS/ΜL (ref 0–0.1)
BASOPHILS NFR BLD AUTO: 1 % (ref 0–1)
BILIRUB SERPL-MCNC: 0.39 MG/DL (ref 0.2–1)
BUN SERPL-MCNC: 9 MG/DL (ref 5–25)
CALCIUM SERPL-MCNC: 9.5 MG/DL (ref 8.4–10.2)
CHLORIDE SERPL-SCNC: 103 MMOL/L (ref 96–108)
CHOLEST SERPL-MCNC: 167 MG/DL
CO2 SERPL-SCNC: 31 MMOL/L (ref 21–32)
CREAT SERPL-MCNC: 0.78 MG/DL (ref 0.6–1.3)
EOSINOPHIL # BLD AUTO: 0.18 THOUSAND/ΜL (ref 0–0.61)
EOSINOPHIL NFR BLD AUTO: 3 % (ref 0–6)
ERYTHROCYTE [DISTWIDTH] IN BLOOD BY AUTOMATED COUNT: 12.7 % (ref 11.6–15.1)
EST. AVERAGE GLUCOSE BLD GHB EST-MCNC: 131 MG/DL
GFR SERPL CREATININE-BSD FRML MDRD: 99 ML/MIN/1.73SQ M
GLUCOSE P FAST SERPL-MCNC: 89 MG/DL (ref 65–99)
HBA1C MFR BLD: 6.2 %
HCT VFR BLD AUTO: 45.3 % (ref 36.5–49.3)
HDLC SERPL-MCNC: 47 MG/DL
HGB BLD-MCNC: 14.8 G/DL (ref 12–17)
IMM GRANULOCYTES # BLD AUTO: 0.04 THOUSAND/UL (ref 0–0.2)
IMM GRANULOCYTES NFR BLD AUTO: 1 % (ref 0–2)
LDLC SERPL CALC-MCNC: 107 MG/DL (ref 0–100)
LYMPHOCYTES # BLD AUTO: 1.99 THOUSANDS/ΜL (ref 0.6–4.47)
LYMPHOCYTES NFR BLD AUTO: 32 % (ref 14–44)
MCH RBC QN AUTO: 30.8 PG (ref 26.8–34.3)
MCHC RBC AUTO-ENTMCNC: 32.7 G/DL (ref 31.4–37.4)
MCV RBC AUTO: 94 FL (ref 82–98)
MONOCYTES # BLD AUTO: 0.5 THOUSAND/ΜL (ref 0.17–1.22)
MONOCYTES NFR BLD AUTO: 8 % (ref 4–12)
NEUTROPHILS # BLD AUTO: 3.52 THOUSANDS/ΜL (ref 1.85–7.62)
NEUTS SEG NFR BLD AUTO: 55 % (ref 43–75)
NRBC BLD AUTO-RTO: 0 /100 WBCS
PLATELET # BLD AUTO: 299 THOUSANDS/UL (ref 149–390)
PMV BLD AUTO: 9.6 FL (ref 8.9–12.7)
POTASSIUM SERPL-SCNC: 4.1 MMOL/L (ref 3.5–5.3)
PROT SERPL-MCNC: 6.7 G/DL (ref 6.4–8.4)
PSA SERPL-MCNC: 0.52 NG/ML (ref 0–4)
RBC # BLD AUTO: 4.81 MILLION/UL (ref 3.88–5.62)
SODIUM SERPL-SCNC: 138 MMOL/L (ref 135–147)
T4 FREE SERPL-MCNC: 0.78 NG/DL (ref 0.61–1.12)
TRIGL SERPL-MCNC: 66 MG/DL
TSH SERPL DL<=0.05 MIU/L-ACNC: 6.69 UIU/ML (ref 0.45–4.5)
WBC # BLD AUTO: 6.26 THOUSAND/UL (ref 4.31–10.16)

## 2024-10-08 PROCEDURE — G0103 PSA SCREENING: HCPCS

## 2024-10-08 PROCEDURE — 36415 COLL VENOUS BLD VENIPUNCTURE: CPT

## 2024-10-08 PROCEDURE — 80053 COMPREHEN METABOLIC PANEL: CPT

## 2024-10-08 PROCEDURE — 84443 ASSAY THYROID STIM HORMONE: CPT

## 2024-10-08 PROCEDURE — 80061 LIPID PANEL: CPT

## 2024-10-08 PROCEDURE — 84439 ASSAY OF FREE THYROXINE: CPT

## 2024-10-08 PROCEDURE — 83036 HEMOGLOBIN GLYCOSYLATED A1C: CPT

## 2024-10-08 PROCEDURE — 85025 COMPLETE CBC W/AUTO DIFF WBC: CPT

## 2024-10-14 ENCOUNTER — TELEPHONE (OUTPATIENT)
Age: 59
End: 2024-10-14

## 2024-10-14 NOTE — TELEPHONE ENCOUNTER
Patient called again asking if someone is calling him back to reschedule his ANDREA appointment. Writer informed patient the message was forward to the correct person and she will reach out as soon as she has an appointment available.

## 2024-10-14 NOTE — TELEPHONE ENCOUNTER
Patient called office requesting to cancel and r/s ANDREA appt for tomorrow 10/15/24. Writer informed patient the appt was cancelled and someone will return his call to r/s

## 2024-10-15 NOTE — TELEPHONE ENCOUNTER
Patient called in to check the status of having their Transfer Of Care appointment rescheduled.    Writer stated messages were sent to the person that is able to take care of them and they will reach out when an appointment becomes available.    The best contact number is [phone]

## 2024-10-22 NOTE — PSYCH
Psychiatric Medication Management - Behavioral Health   Bill Haji 59 y.o. male MRN: [id number]    Reason for Visit:   Chief Complaint   Patient presents with    Medication Management     Assessment & Plan  Schizophrenia  generally stable    Continue Risperdal 1 mg in the morning and 4 mg at bedtime to continue to help with mood stabilization and hallucinations  Recommended continuation of outpatient therapy at [facility]   Orders:    risperiDONE (RisperDAL) 1 mg tablet; Risperidone 1 mg : 1 tablet in the morning    risperiDONE (RisperDAL) 4 mg tablet; Risperidone 4m tablet po night    Trauma and stressor-related disorder  variable   Continue amitriptyline 50 mg at bedtime as needed for sleep  Continue Cymbalta 60 mg daily to continue to help with mood  Recommended continuation of outpatient therapy at [facility]        Depression, unspecified depression type  variable   Continue amitriptyline 50 mg at bedtime as needed for sleep  Continue Cymbalta 60 mg daily to continue to help with mood  Recommended continuation of outpatient therapy at [facility]        Generalized anxiety disorder  variable   Continue amitriptyline 50 mg at bedtime as needed for sleep  Continue Cymbalta 60 mg daily to continue to help with mood  Recommended continuation of outpatient therapy at [facility]   Orders:    amitriptyline (ELAVIL) 50 mg tablet; Amitriptyline 50m tablet po at night PRN    DULoxetine (CYMBALTA) 60 mg delayed release capsule; Take 1 capsule (60 mg total) by mouth 2 (two) times a day Duloxetine 60m capsule po twice daily       Assessment/Plan:     Impression:  Depression, unspecified - variable   Generalized anxiety disorder - variable   Schizophrenia - generally stable    Tobacco use disorder, moderate, dependence  Trauma and stressor related disorder - variable      Continue amitriptyline 50 mg at bedtime as needed for sleep  Continue Cymbalta 60 mg BID to continue to help with  "mood  Continue Risperdal 1 mg in the morning and 4 mg at bedtime to continue to help with mood stabilization and hallucinations  Recommended continuation of outpatient therapy at [facility]   Medical follow up with PCP as needed  Follow up in 3 months      Treatment Plan: Patient is connected to a therapist at [facility]. Both a treatment plan and a crisis plan were completed at today's visit. Patient verbally consented and signed both forms while in the office today. Next treatment plan due 4/25/2025. Next crisis plan due 10/25/2025.     Treatment Recommendations:      Risks, Benefits And Possible Side Effects Of Medications:  Risks, benefits, and possible side effects of medications explained to patient and family, they verbalize understanding    Controlled Medication Discussion:  Not applicable - controlled substances not prescribed by this practice.        Subjective:  Medication compliance: yes  Medication side effects: ashley Khan is a 58 y/o male being seen for transfer of care appointment today, was previously seeing Dr. Ortiz, last appointment was on 6/14/2024. Patient has psychiatric diagnoses including depression, generalized anxiety disorder, schizophrenia, and trauma and stressor related disorder. Patient is currently being prescribed amitriptyline 50 mg at bedtime as needed, Cymbalta 60 mg BID, and Risperdal 1 mg in the morning and 4 mg at bedtime. Patient is currently connected to outpatient therapy at [facility]. No additional services in place at this time.    Patient presents today reporting \"good\" mood. Sleep has been half and half, getting about 6-7 hours on average. Reports having a hard time falling asleep some nights but no trouble other nights. Bill wakes up a few times throughout the night to use the bathroom but is able to fall back to sleep. Energy and motivation levels have been good. Bill has been reading different magazines and the paper to keep busy. " Appetite has been fluctuating, reports eating about 2 meals daily and some snacks. States that he either feels hungry or doesn't but he is trying to eat an adequate amount of food daily. Patient has lost some weight over the past few years but he is focusing on protein now so he doesn't lose more weight. Patient denies any significant mood swings, irritability, aggression, or elevated moods. States that he has the urge to cry almost everyday since his dog passed away in July but he is able to hold back his tears and control this. Patient rates their depression a 7/10 on a severity scale today and they rate their anxiety a 5/10. Bill states that he has been managing his anxiety and depression well and his medications are working for him. He states that he has been a little more depressed since his dog passed away but therapy has been helping. Bill does not wish for any medication changes today. Denies SI/HI. Denies access to guns or weapons. Denies AH/VH. Encouraged patient to call the office with any questions or concerns. Patient feels comfortable following up in 3 months.     Review Of Systems:     Constitutional Negative   ENT Negative   Cardiovascular Negative   Respiratory Negative   Gastrointestinal Negative   Genitourinary Negative   Musculoskeletal Negative   Integumentary Negative   Neurological Negative   Endocrine Negative     Past Medical History:   Patient Active Problem List   Diagnosis    Depression    Tobacco use disorder, moderate, dependence    SOB (shortness of breath)    Tobacco use disorder, mild, abuse    Trauma and stressor-related disorder    Schizophrenia ([facility])    Generalized anxiety disorder    Osteoporosis    Psoriasis    COPD (chronic obstructive pulmonary disease) ([facility])    Spinal stenosis of cervical region       Allergies:   Allergies   Allergen Reactions    Bupropion Itching and Rash    Oxcarbazepine Rash and Hives     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 Stated he was then diagnosised with schizophrenia- was seeing stuff and hearing voices  No IOP or PHP   Therapy - [facility] - every 6 weeks   No hx of violent behavior   No SA or SIB   Past medication trials: Prozac, bupropion, Trileptal (allergies)     Family Psychiatric History:   Brother - depression      Social History:   Educational- highest level of educations 12th grade- diploma  Learning Disorder- \"slow learner\" - was in special classes  Marital Status:  since 2004  Live in [place], [place], living with brother, brother's girlfriend and her son (21 y/o)   Also close with sister (lives in [place]), talk on the phone   Unemployed   Hobbies = reading, walking, listening to music, watching tv, cooking     Substance Abuse History:  Tobacco hx: Bill smokes cigars - once a week; smoked for 17yrs old   Illicit: occasionally smoke marijuana, about once weekly   Alcohol - \"once a while\"- once every couple months- denies black outs, DUI ,seizures     Traumatic History:   Denies history of physical, verbal, sexual, and emotional abuse.  Traumatic events: dog passed away in July (was 15 years ago),  in 2004    The following portions of the patient's history were reviewed and updated as appropriate: allergies, current medications, past family history, past medical history, past social history, past surgical history, and problem list.    Objective:  There were no vitals filed for this visit.      Weight (last 2 days)       None          Labs: Lipid panel and fasting glucose WNL on 10/8/2024    Mental status:  Appearance " "sitting comfortably in chair, dressed in casual clothing, cooperative with interview, fair eye contact   Mood \"Good\"   Affect Appears generally euthymic, stable, mood-congruent   Speech Normal rate, rhythm, and volume   Thought Processes Linear and goal directed   Associations intact associations   Hallucinations Denies any auditory or visual hallucinations   Thought Content No passive or active suicidal or homicidal ideation, intent, or plan.   Orientation Oriented to person, place, time, and situation   Recent and Remote Memory Grossly intact   Attention Span and Concentration Concentration intact   Intellect Not Formally Assessed   Insight Insight intact   Judgement judgment was intact   Muscle Strength Muscle strength and tone were normal   Language Within normal limits   Fund of Knowledge Age appropriate   Pain mild     PHQ-A Depression Screening    Feeling down, depressed, irritable or hopeless: 1 - several days  Little interest or pleasure in doing things: 0 - not at all  Trouble falling or staying asleep, or sleeping too much: 1 - several days  Poor appetite or overeating: 3 - nearly every day  Feeling tired or having little energy: 0 - not at all  Feeling bad about yourself - or that you are a failure or have let yourself or your family down: 1 - several days  Trouble concentrating on things, such as reading the newspaper or watching television: 1 - several days  Moving or speaking so slowly that other people could have noticed. Or the opposite - being so fidgety or restless that you have been moving around a lot more than usual: 0 - not at all  Thoughts that you would be better off dead, or of hurting yourself in some way: 0 - not at all          DAVIS-7 Flowsheet Screening      Flowsheet Row Most Recent Value   Over the last two weeks, how often have you been bothered by the following problems?     Feeling nervous, anxious, or on edge 1   Not being able to stop or control worrying 1   Worrying too much " about different things 1   Trouble relaxing  1   Being so restless that it's hard to sit still 0   Becoming easily annoyed or irritable  0   Feeling afraid as if something awful might happen 1   How difficult have these problems made it for you to do your work, take care of things at home, or get along with other people?  Somewhat difficult   DAVIS Score  5             Visit Time    Visit Start Time: 1000  Visit Stop Time: 1037  Total Visit Duration:  37 minutes      Raysa Lynch PA-C  10/25/24

## 2024-10-25 ENCOUNTER — OFFICE VISIT (OUTPATIENT)
Dept: PSYCHIATRY | Facility: CLINIC | Age: 59
End: 2024-10-25
Payer: COMMERCIAL

## 2024-10-25 DIAGNOSIS — F20.9 SCHIZOPHRENIA, UNSPECIFIED TYPE (HCC): Primary | ICD-10-CM

## 2024-10-25 DIAGNOSIS — F32.A DEPRESSION, UNSPECIFIED DEPRESSION TYPE: ICD-10-CM

## 2024-10-25 DIAGNOSIS — F43.9 TRAUMA AND STRESSOR-RELATED DISORDER: ICD-10-CM

## 2024-10-25 DIAGNOSIS — F41.1 GENERALIZED ANXIETY DISORDER: ICD-10-CM

## 2024-10-25 PROBLEM — L40.9 PSORIASIS: Status: ACTIVE | Noted: 2024-10-25

## 2024-10-25 PROBLEM — M81.0 OSTEOPOROSIS: Status: ACTIVE | Noted: 2020-12-16

## 2024-10-25 PROBLEM — M48.02 SPINAL STENOSIS OF CERVICAL REGION: Status: ACTIVE | Noted: 2020-08-11

## 2024-10-25 PROCEDURE — 99214 OFFICE O/P EST MOD 30 MIN: CPT

## 2024-10-25 RX ORDER — RISPERIDONE 4 MG/1
TABLET ORAL
Qty: 90 TABLET | Refills: 1 | Status: SHIPPED | OUTPATIENT
Start: 2024-10-25

## 2024-10-25 RX ORDER — USTEKINUMAB 45 MG/.5ML
INJECTION, SOLUTION SUBCUTANEOUS
COMMUNITY
Start: 2024-08-15

## 2024-10-25 RX ORDER — RISPERIDONE 1 MG/1
TABLET ORAL
Qty: 90 TABLET | Refills: 1 | Status: SHIPPED | OUTPATIENT
Start: 2024-10-25

## 2024-10-25 RX ORDER — GLYCOPYRROLATE AND FORMOTEROL FUMARATE 9; 4.8 UG/1; UG/1
2 AEROSOL, METERED RESPIRATORY (INHALATION) 2 TIMES DAILY
COMMUNITY
Start: 2024-10-07

## 2024-10-25 RX ORDER — AMITRIPTYLINE HYDROCHLORIDE 50 MG/1
TABLET ORAL
Qty: 90 TABLET | Refills: 1 | Status: SHIPPED | OUTPATIENT
Start: 2024-10-25

## 2024-10-25 RX ORDER — DULOXETIN HYDROCHLORIDE 60 MG/1
60 CAPSULE, DELAYED RELEASE ORAL 2 TIMES DAILY
Qty: 180 CAPSULE | Refills: 1 | Status: SHIPPED | OUTPATIENT
Start: 2024-10-25

## 2024-10-25 NOTE — BH TREATMENT PLAN
TREATMENT PLAN (Medication Management Only)        [facility]    Name and Date of Birth:  Bill Haji 59 y.o. 1965  Date of Treatment Plan: October 25, 2024  Diagnosis/Diagnoses:    1. Undifferentiated schizophrenia (HCC)    2. Trauma and stressor-related disorder    3. Depression, unspecified depression type    4. Generalized anxiety disorder    5. Schizophrenia      Strengths/Personal Resources for Self-Care: supportive family, taking medications as prescribed, ability to adapt to life changes.  Area/Areas of need (in own words): anxiety, depression  1. Long Term Goal: continue improvement in mood.  Target Date:6 months - 4/25/2025  Person/Persons responsible for completion of goal: Bill  2.  Short Term Objective (s) - How will we reach this goal?:   A. Provider new recommended medication/dosage changes and/or continue medication(s): continue current medications as prescribed.  B. Attend medication management appointments regularly.  C. Attend psychotherapy regularly (Lyn Schmidt at [facility])   Target Date:6 months - 4/25/2025  Person/Persons Responsible for Completion of Goal: Bill  Progress Towards Goals: stable  Treatment Modality: medication management every 3 months  Review due 180 days from date of this plan: 6 months - 4/25/2025  Expected length of service: ongoing treatment  My Physician/PA/NP and I have developed this plan together and I agree to work on the goals and objectives. I understand the treatment goals that were developed for my treatment.

## 2024-10-25 NOTE — ASSESSMENT & PLAN NOTE
generally stable    Continue Risperdal 1 mg in the morning and 4 mg at bedtime to continue to help with mood stabilization and hallucinations  Recommended continuation of outpatient therapy at [facility]   Orders:    risperiDONE (RisperDAL) 1 mg tablet; Risperidone 1 mg : 1 tablet in the morning    risperiDONE (RisperDAL) 4 mg tablet; Risperidone 4m tablet po night

## 2024-10-25 NOTE — ASSESSMENT & PLAN NOTE
variable   Continue amitriptyline 50 mg at bedtime as needed for sleep  Continue Cymbalta 60 mg daily to continue to help with mood  Recommended continuation of outpatient therapy at [facility]

## 2024-10-25 NOTE — ASSESSMENT & PLAN NOTE
variable   Continue amitriptyline 50 mg at bedtime as needed for sleep  Continue Cymbalta 60 mg daily to continue to help with mood  Recommended continuation of outpatient therapy at [facility]

## 2024-10-25 NOTE — BH CRISIS PLAN
Client Name: Bill Haji       Client YOB: 1965    [facility]rike[facility] Safety Plan      Creation Date: 10/25/24 Update Date: 10/25/25   Created By: Raysa Lynch PA-C Last Updated By: Raysa Lynch PA-C      Step 1: Warning Signs:   Warning Signs   Crying more often   Nervous a lot   hearing voices            Step 2: Internal Coping Strategies:   Internal Coping Strategies   Take a walk   Listen to music   Watch tv   Cooking            Step 3: People and social settings that provide distraction:   Name Contact Information   Brother (Kang) number in cell phone   Sister (Heidi) number in cell phone            Step 4: People whom I can ask for help during a crisis:      Name Contact Information    Brother (Kang) number in cell phone    Sister (Heiid) number in cell phone      Step 5: Professionals or agencies I can contact during a crisis:      Clinican/Agency Name Phone Emergency Contact    Raysa Lynch PA-C ([facility]) [phone]     Lyn Schmidt ([facility] therapist) [phone]       Local Emergency Department Emergency Department Phone Emergency Department Address    911          Crisis Phone Numbers:   Suicide Prevention Lifeline: Call or Text  374 Crisis Text Line: Text HOME to 752-206   Please note: Some [facility] do not have a separate number for Child/Adolescent specific crisis. If your county is not listed under Child/Adolescent, please call the adult number for your county      Adult Crisis Numbers: Child/Adolescent Crisis Numbers   [facility]: [phone] [facility]: [phone]   [facility]: [phone] [facility]: [phone]   [facility]: [phone] [place]: [phone]   [facility]: [phone] [place] [facility]: [phone]   [place] [facility]: [phone]   [facility]: [phone]   [facility]: [phone]   [facility]: [phone] (daytime) [phone]  (after hours, weekends, holidays)      Step 6: Making the environment safer (plan for lethal means safety):   Plan: No firearms in the house      Optional: What is most important to me and worth living for?      Wanda Safety Plan. Elzbieta Calvert and Luis Daniel Middleton. Used with permission of the authors.

## 2024-10-25 NOTE — ASSESSMENT & PLAN NOTE
variable   Continue amitriptyline 50 mg at bedtime as needed for sleep  Continue Cymbalta 60 mg daily to continue to help with mood  Recommended continuation of outpatient therapy at [facility]   Orders:    amitriptyline (ELAVIL) 50 mg tablet; Amitriptyline 50m tablet po at night PRN    DULoxetine (CYMBALTA) 60 mg delayed release capsule; Take 1 capsule (60 mg total) by mouth 2 (two) times a day Duloxetine 60m capsule po twice daily

## 2024-11-04 ENCOUNTER — SOCIAL WORK (OUTPATIENT)
Dept: BEHAVIORAL/MENTAL HEALTH CLINIC | Facility: CLINIC | Age: 59
End: 2024-11-04
Payer: COMMERCIAL

## 2024-11-04 DIAGNOSIS — F20.3 UNDIFFERENTIATED SCHIZOPHRENIA (HCC): Chronic | ICD-10-CM

## 2024-11-04 DIAGNOSIS — F17.200 TOBACCO USE DISORDER, MILD, ABUSE: ICD-10-CM

## 2024-11-04 DIAGNOSIS — F41.1 GENERALIZED ANXIETY DISORDER: Primary | ICD-10-CM

## 2024-11-04 DIAGNOSIS — F32.A DEPRESSION, UNSPECIFIED DEPRESSION TYPE: ICD-10-CM

## 2024-11-04 PROCEDURE — 90837 PSYTX W PT 60 MINUTES: CPT | Performed by: COUNSELOR

## 2024-11-04 NOTE — BH TREATMENT PLAN
"Bill Haji  1965       Date of Initial Treatment Plan: see Credible for past plans   Date of Current Treatment Plan: 11/04/24  Plan target date: 5/3/25  Plan expiration date: 5/3/25       Treatment Plan Number 1 in Epic     Strengths/Personal Resources for Self Care: Cooking, reading, listening to music, desire to be healthy, caring of loved ones, dedicated to family, pitch in and help when needed.    Diagnosis:   1. Generalized anxiety disorder        2. Undifferentiated schizophrenia (HCC)        3. Depression, unspecified depression type        4. Tobacco use disorder, mild, abuse              Area of Needs: maintenance support to deal effectively with MH, Outlet to vent and manage life stress, Continue to build confidence and esteem.        Long Term Goal 1:  \"I want to feel happier in my life so I don't feel so depressed.  I like to have a place to talk about things that scare me, grouind my fears and build better skills to manage in healthier way.\".     Target Date: 5/3/25  Completion Date: 5/3/25         Short Term Objectives for Goal 1:  Continue maintenance support visits q 6 weeks with Gladis to be stable in face of life stress.   \"I value coming to talk with you to support me in my MH issues.  It has kept me stable and I have not needed any higher level of care.\".     How will client know treatment is completed?  \"Able to cope with life stress without tx support.\".      Behavioral Health Treatment Plan [facility]: Diagnosis and Treatment Plan explained to Bill Khan relates understanding diagnosis and is agreeable to Treatment Plan.      Client Comments : Please share your thoughts, feelings, need and/or experiences regarding your treatment plan: Bill agreed to above listed tx plan.    "

## 2024-11-04 NOTE — PSYCH
"Behavioral Health Psychotherapy Progress Note    Psychotherapy Provided: Individual Psychotherapy     1. Generalized anxiety disorder        2. Undifferentiated schizophrenia (HCC)        3. Depression, unspecified depression type        4. Tobacco use disorder, mild, abuse            Goals addressed in session: Goal 1     DATA:     Client shared about current events and status.  Session focused on current life stressors.  Ct reports constipation and or diarrhea.  \"I have 1-2 bowel movements daily.  Weight is 103 lbs, 5'2\".  Ct presents with medium length hair and clean shaven.  He has lost the beard his brother was asking him to wear.  C/o issues with dry mouth.  Ct. Sleeping about 6-7 hours, wakes to urinate.  Ct reports testing as borderline diabetic.    During this session, this clinician used the following therapeutic modalities: Cognitive Behavioral Therapy, Dialectical Behavior Therapy, Mindfulness-based Strategies, Motivational Interviewing, and Supportive Psychotherapy    Substance Abuse was not addressed during this session. If the client is diagnosed with a co-occurring substance use disorder, please indicate any changes in the frequency or amount of use: NA. Stage of change for addressing substance use diagnoses: No substance use/Not applicable    ASSESSMENT:  Bill Haji presents with a Euthymic/ normal mood.  Client remains engaged and participative in session.  Client Is appreciative of treatment support.     his affect is Normal range and intensity, which is congruent, with his mood and the content of the session. The client has made progress on their goals.    Bill Haji presents with a none risk of suicide, none risk of self-harm, and none risk of harm to others.  He continues to feel that therapy is an important part of his maintenance MH work.    For any risk assessment that surpasses a \"low\" rating, a safety plan must be developed.    A safety plan was indicated: no  If yes, describe in " detail NA    PLAN: Between sessions, Bill Haji will continue to work on above issues and treatment goals. . At the next session, the therapist will use Cognitive Behavioral Therapy, Dialectical Behavior Therapy, Mindfulness-based Strategies, Motivational Interviewing, and Supportive Psychotherapy to address same.    Behavioral Health Treatment Plan and Discharge Planning: Bill Haji is aware of and agrees to continue to work on their treatment plan. They have identified and are working toward their discharge goals. yes    Visit start and stop times:    11/04/24  Start Time: 1003  Stop Time: 1103  Total Visit Time: 60 minutes

## 2024-11-07 ENCOUNTER — TELEPHONE (OUTPATIENT)
Age: 59
End: 2024-11-07

## 2024-11-07 NOTE — TELEPHONE ENCOUNTER
Patient called in to schedule their 6 week follow up.    Patient is now scheduled on 12/16/24 @10am in office.

## 2024-12-16 ENCOUNTER — SOCIAL WORK (OUTPATIENT)
Dept: BEHAVIORAL/MENTAL HEALTH CLINIC | Facility: CLINIC | Age: 59
End: 2024-12-16
Payer: COMMERCIAL

## 2024-12-16 DIAGNOSIS — F41.1 GENERALIZED ANXIETY DISORDER: ICD-10-CM

## 2024-12-16 DIAGNOSIS — F17.200 TOBACCO USE DISORDER, MODERATE, DEPENDENCE: ICD-10-CM

## 2024-12-16 DIAGNOSIS — F32.A DEPRESSION, UNSPECIFIED DEPRESSION TYPE: ICD-10-CM

## 2024-12-16 DIAGNOSIS — F20.3 UNDIFFERENTIATED SCHIZOPHRENIA (HCC): Primary | ICD-10-CM

## 2024-12-16 PROCEDURE — 90834 PSYTX W PT 45 MINUTES: CPT | Performed by: COUNSELOR

## 2024-12-31 NOTE — PSYCH
"Behavioral Health Psychotherapy Progress Note    Psychotherapy Provided: Individual Psychotherapy     1. Undifferentiated schizophrenia (HCC)        2. Generalized anxiety disorder        3. Depression, unspecified depression type        4. Tobacco use disorder, moderate, dependence            Goals addressed in session: Goal 1     DATA:     Client shared about current events and status.  Reviewed treatment goals.  Explored any barriers to change and how to address them to move forward. Session focused on holidays, having visited his sister for more than a week, and plans for upcoming holiday.  Anxiety remains challenging.  Missing his dog who .  Living with brother and GF, and limitations and benefits of situation.  Celebrated successes and growth when noted.  Continue to process feelings, build insights and reinforce coping skills.      During this session, this clinician used the following therapeutic modalities: Cognitive Behavioral Therapy, Dialectical Behavior Therapy, Mindfulness-based Strategies, Motivational Interviewing, and Supportive Psychotherapy    Substance Abuse was addressed during this session. If the client is diagnosed with a co-occurring substance use disorder, please indicate any changes in the frequency or amount of use: discussed benefits of smoking cessation.  Ct. Reports he continues to try and decrease amount smoked daily.. Stage of change for addressing substance use diagnoses: Contemplation    ASSESSMENT:  Bill Haji presents with a Euthymic/ normal mood.  Client remains engaged and participative in session.  Client is appreciative of treatment support.        his affect is Normal range and intensity, which is congruent, with his mood and the content of the session. The client has made progress on their goals.    Bill Haji presents with a none risk of suicide, none risk of self-harm, and none risk of harm to others.    For any risk assessment that surpasses a \"low\" rating, a " safety plan must be developed.    A safety plan was indicated: no  If yes, describe in detail NA    PLAN: Between sessions, Bill Haji will continue to work on above issues and treatment goals.  . At the next session, the therapist will use Cognitive Behavioral Therapy, Dialectical Behavior Therapy, Mindfulness-based Strategies, Motivational Interviewing, and Supportive Psychotherapy to address same.    Behavioral Health Treatment Plan and Discharge Planning: Bill Haji is aware of and agrees to continue to work on their treatment plan. They have identified and are working toward their discharge goals. yes    Depression Follow-up Plan Completed: Not applicable    Visit start and stop times:    12/16/24  Start Time: 1006  Stop Time: 1055  Total Visit Time: 49 minutes

## 2025-01-22 NOTE — PSYCH
Psychiatric Medication Management - Behavioral Health   Bill Haji 59 y.o. male MRN: [id number]    Reason for Visit:   Chief Complaint   Patient presents with    Medication Management     Assessment & Plan  Undifferentiated schizophrenia (HCC)  generally stable   Continue amitriptyline 50 mg at bedtime as needed for sleep  Continue Cymbalta 60 mg BID to continue to help with mood  Continue Risperdal 1 mg in the morning and 4 mg at bedtime to continue to help with mood stabilization and hallucinations  Recommended continuation of outpatient therapy at [facility]          Generalized anxiety disorder  generally stable   Continue amitriptyline 50 mg at bedtime as needed for sleep  Continue Cymbalta 60 mg BID to continue to help with mood  Recommended continuation of outpatient therapy at [facility]          Trauma and stressor-related disorder  generally stable   Continue amitriptyline 50 mg at bedtime as needed for sleep  Continue Cymbalta 60 mg BID to continue to help with mood  Recommended continuation of outpatient therapy at [facility]             Assessment/Plan:     Impression:  Depression, unspecified - generally stable   Generalized anxiety disorder - generally stable   Schizophrenia - generally stable    Tobacco use disorder, moderate, dependence  Trauma and stressor related disorder - variable      Continue amitriptyline 50 mg at bedtime as needed for sleep  Continue Cymbalta 60 mg BID to continue to help with mood  Continue Risperdal 1 mg in the morning and 4 mg at bedtime to continue to help with mood stabilization and hallucinations  Recommended continuation of outpatient therapy at [facility]   Medical follow up with PCP as needed  Follow up in 3 months      Treatment Plan: Patient is connected to a therapist at [facility]. Next treatment plan due 4/25/2025. Next crisis plan due 10/25/2025.     Treatment Recommendations:      Risks, Benefits And Possible Side Effects Of  "Medications:  Risks, benefits, and possible side effects of medications explained to patient and family, they verbalize understanding    Controlled Medication Discussion:  Not applicable - controlled substances not prescribed by this practice.        Subjective:  Medication compliance: yes  Medication side effects: ashley Khan is a 58 y/o male being seen for transfer of care appointment today, was previously seeing Dr. Ortiz, last appointment was on 6/14/2024. Patient has psychiatric diagnoses including depression, generalized anxiety disorder, schizophrenia, and trauma and stressor related disorder. Patient is currently being prescribed amitriptyline 50 mg at bedtime as needed, Cymbalta 60 mg BID, and Risperdal 1 mg in the morning and 4 mg at bedtime. Patient is currently connected to outpatient therapy at [facility]. No additional services in place at this time.    Patient presents today reporting \"good\" mood. Sleep has been good, getting about 6-7 hours each night. No issues falling asleep or staying asleep. Energy and motivation levels have been good. Patient is excited to go on a trip this weekend and he has been enjoying being around family lately. Appetite has been about the same, eating about 2 meals and a protein shake daily. Patient denies any significant mood swings, irritability, aggression, or elevated moods. Patient reports some sadness and crying spells when he thinks about losing his dog and mom. Patient rates their depression a 2/10 on a severity scale today and they rate their anxiety a 1/10. Patient continues to follow up with therapy about every 6 weeks and this is going well. He has been going for walks and enjoys being outside. Patient states that his anxiety and depression have been very manageable for him. Bill feels that his medications are still working well and he does not wish for any changes. Denies SI/HI. Denies access to guns or weapons. Denies AH/VH. Encouraged patient " "to call the office with any questions or concerns. Bill feels comfortable following up in about 3 months.     Review Of Systems:     Constitutional Negative   ENT Negative   Cardiovascular Negative   Respiratory Negative   Gastrointestinal Negative   Genitourinary Negative   Musculoskeletal Negative   Integumentary Negative   Neurological Negative   Endocrine Negative     Past Medical History:   Patient Active Problem List   Diagnosis    Depression    Tobacco use disorder, moderate, dependence    SOB (shortness of breath)    Tobacco use disorder, mild, abuse    Trauma and stressor-related disorder    Schizophrenia (HCC)    Generalized anxiety disorder    Osteoporosis    Psoriasis    COPD (chronic obstructive pulmonary disease) (HCC)    Spinal stenosis of cervical region       Allergies:   Allergies   Allergen Reactions    Bupropion Itching and Rash    Oxcarbazepine Rash and Hives     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 Stated he was then diagnosised with schizophrenia- was seeing stuff and hearing voices  No IOP or PHP   Therapy - [facility] - every 6 weeks   No hx of violent behavior   No SA or SIB   Past medication trials: Prozac, bupropion, Trileptal (allergies)     Family Psychiatric History:   Brother - depression      Social History:   Educational- highest level of educations 12th grade- diploma  Learning Disorder- \"slow learner\" - was in special classes  Marital Status:  since 2004  Live in [place], [place], living with brother, brother's girlfriend and her son (21 y/o)   Also close with sister " "(lives in [place]), talk on the phone   Unemployed   Hobbies = reading, walking, listening to music, watching tv, cooking     Substance Abuse History:  Tobacco hx: Bill smokes cigars - once a week; smoked for 17yrs old   Illicit: occasionally smoke marijuana, about once weekly   Alcohol - \"once a while\"- once every couple months- denies black outs, DUI ,seizures     Traumatic History:   Denies history of physical, verbal, sexual, and emotional abuse.  Traumatic events: dog passed away in July (was 15 years ago),  in 2004    The following portions of the patient's history were reviewed and updated as appropriate: allergies, current medications, past family history, past medical history, past social history, past surgical history, and problem list.    Objective:  There were no vitals filed for this visit.      Weight (last 2 days)       None          Labs: Lipid panel and fasting glucose WNL on 10/8/2024    Mental status:  Appearance sitting comfortably in chair, dressed in casual clothing, cooperative with interview, fair eye contact   Mood \"Good\"   Affect Appears generally euthymic, stable, mood-congruent   Speech Normal rate, rhythm, and volume   Thought Processes Linear and goal directed   Associations intact associations   Hallucinations Denies any auditory or visual hallucinations   Thought Content No passive or active suicidal or homicidal ideation, intent, or plan.   Orientation Oriented to person, place, time, and situation   Recent and Remote Memory Grossly intact   Attention Span and Concentration Concentration intact   Intellect Not Formally Assessed   Insight Insight intact   Judgement judgment was intact   Muscle Strength Muscle strength and tone were normal   Language Within normal limits   Fund of Knowledge Age appropriate   Pain mild     Visit Time    Visit Start Time: 1010  Visit Stop Time: 1019  Total Visit Duration:  09 minutes    Raysa Lynch PA-C  01/24/25    "

## 2025-01-24 ENCOUNTER — OFFICE VISIT (OUTPATIENT)
Dept: PSYCHIATRY | Facility: CLINIC | Age: 60
End: 2025-01-24
Payer: COMMERCIAL

## 2025-01-24 DIAGNOSIS — F43.9 TRAUMA AND STRESSOR-RELATED DISORDER: ICD-10-CM

## 2025-01-24 DIAGNOSIS — F41.1 GENERALIZED ANXIETY DISORDER: ICD-10-CM

## 2025-01-24 DIAGNOSIS — F20.3 UNDIFFERENTIATED SCHIZOPHRENIA (HCC): Primary | ICD-10-CM

## 2025-01-24 PROCEDURE — 99214 OFFICE O/P EST MOD 30 MIN: CPT

## 2025-01-24 NOTE — ASSESSMENT & PLAN NOTE
generally stable   Continue amitriptyline 50 mg at bedtime as needed for sleep  Continue Cymbalta 60 mg BID to continue to help with mood  Recommended continuation of outpatient therapy at [facility]

## 2025-01-24 NOTE — ASSESSMENT & PLAN NOTE
generally stable   Continue amitriptyline 50 mg at bedtime as needed for sleep  Continue Cymbalta 60 mg BID to continue to help with mood  Continue Risperdal 1 mg in the morning and 4 mg at bedtime to continue to help with mood stabilization and hallucinations  Recommended continuation of outpatient therapy at [facility]

## 2025-01-24 NOTE — ASSESSMENT & PLAN NOTE
generally stable   Continue amitriptyline 50 mg at bedtime as needed for sleep  Continue Cymbalta 60 mg BID to continue to help with mood  Recommended continuation of outpatient therapy at [facility]

## 2025-01-27 ENCOUNTER — SOCIAL WORK (OUTPATIENT)
Dept: BEHAVIORAL/MENTAL HEALTH CLINIC | Facility: CLINIC | Age: 60
End: 2025-01-27
Payer: COMMERCIAL

## 2025-01-27 DIAGNOSIS — F41.1 GENERALIZED ANXIETY DISORDER: ICD-10-CM

## 2025-01-27 DIAGNOSIS — F32.A DEPRESSION, UNSPECIFIED DEPRESSION TYPE: ICD-10-CM

## 2025-01-27 DIAGNOSIS — F17.200 TOBACCO USE DISORDER, MODERATE, DEPENDENCE: Primary | ICD-10-CM

## 2025-01-27 DIAGNOSIS — F20.3 UNDIFFERENTIATED SCHIZOPHRENIA (HCC): ICD-10-CM

## 2025-01-27 PROCEDURE — 90834 PSYTX W PT 45 MINUTES: CPT | Performed by: COUNSELOR

## 2025-01-27 NOTE — PSYCH
"Behavioral Health Psychotherapy Progress Note    Psychotherapy Provided: Individual Psychotherapy     1. Tobacco use disorder, moderate, dependence        2. Generalized anxiety disorder        3. Undifferentiated schizophrenia (HCC)        4. Depression, unspecified depression type            Goals addressed in session: Goal 1     DATA:     Client shared about current events and status.  Reviewed treatment goals.  Explored any barriers to change and how to address them to move forward. Session focused on keeping MH stable with life stressors.  \"I found out how my brother's friend killed himself.\".  processed feelings associated with finding out.  Ct. Missing his mother and his dog.  \"Since Valentina's son, Adriano moved out into the , the cost of HH went up for my brother and Valentina.  So, I have to pay more and can't afford a dog.  He said I could get a fish or gerbil.  I don't think I want to get anything yet.\".  Ct. Doing pretty well.  Celebrated successes and growth where noted.  Continue to process feelings, build insights and reinforce coping skills.      During this session, this clinician used the following therapeutic modalities: Cognitive Behavioral Therapy, Dialectical Behavior Therapy, Mindfulness-based Strategies, Motivational Interviewing, and Supportive Psychotherapy    Substance Abuse was addressed during this session. If the client is diagnosed with a co-occurring substance use disorder, please indicate any changes in the frequency or amount of use: \"My brother wants me to quit smoking cigarettes.  I am smoking about 7-8 self rolled cigarettes daily. Stage of change for addressing substance use diagnoses: No substance use/Not applicable    ASSESSMENT:  Bill Haji presents with a Euthymic/ normal mood.  Client remains engaged and participative in session.  Client is appreciative of treatment support.  his affect is Normal range and intensity, which is congruent, with his mood and the content of the " "session. The client has made progress on their goals.  Bill Haji presents with a none risk of suicide, none risk of self-harm, and none risk of harm to others.    For any risk assessment that surpasses a \"low\" rating, a safety plan must be developed.    A safety plan was indicated: no  If yes, describe in detail NA    PLAN: Between sessions, Bill Haji will continue to work on above issues and treatment goals.   . At the next session, the therapist will use Cognitive Behavioral Therapy, Dialectical Behavior Therapy, Mindfulness-based Strategies, Motivational Interviewing, and Supportive Psychotherapy to address same.    Behavioral Health Treatment Plan and Discharge Planning: Bill Haji is aware of and agrees to continue to work on their treatment plan. They have identified and are working toward their discharge goals. yes    Depression Follow-up Plan Completed: Not applicable    Visit start and stop times:    01/27/25  Start Time: 1001  Stop Time: 1048  Total Visit Time: 47 minutes  "

## 2025-03-10 ENCOUNTER — SOCIAL WORK (OUTPATIENT)
Dept: BEHAVIORAL/MENTAL HEALTH CLINIC | Facility: CLINIC | Age: 60
End: 2025-03-10
Payer: COMMERCIAL

## 2025-03-10 DIAGNOSIS — F41.1 GENERALIZED ANXIETY DISORDER: ICD-10-CM

## 2025-03-10 DIAGNOSIS — F20.3 UNDIFFERENTIATED SCHIZOPHRENIA (HCC): Primary | ICD-10-CM

## 2025-03-10 DIAGNOSIS — F32.A DEPRESSION, UNSPECIFIED DEPRESSION TYPE: ICD-10-CM

## 2025-03-10 DIAGNOSIS — F17.200 TOBACCO USE DISORDER, MILD, ABUSE: ICD-10-CM

## 2025-03-10 PROCEDURE — 90834 PSYTX W PT 45 MINUTES: CPT | Performed by: COUNSELOR

## 2025-03-10 NOTE — PSYCH
Behavioral Health Psychotherapy Progress Note    Psychotherapy Provided: Individual Psychotherapy     1. Undifferentiated schizophrenia (HCC)        2. Generalized anxiety disorder        3. Depression, unspecified depression type        4. Tobacco use disorder, mild, abuse            Goals addressed in session: Goal 1     DATA:   Client shared about current events and status.  Reviewed treatment goals.  Explored any barriers to change and how to address them to move forward. Session focused on current stressors.  Dental work being done.  Front tooth pulled.  Feeling self conscious.  Explored that he may be covered for denture of upper mouth under his ins through medicaid.  Ct. Presents clean shaven.  He is still feeling the pressure from his brother to keep facial hair, but prefers himself clean shaven.  Ct. Reports following up with doctors.  Considering quitting smoking b/c brother pushing him to do so due to his COPD dx and ct's doctors encouraging him to quit.  Provided support and encouragement.  Explored any barriers to change.  Continue to build coping.  Celebrated successes and growth where noted.  Continue to process feelings, build insights and reinforce coping skills.      During this session, this clinician used the following therapeutic modalities: Cognitive Behavioral Therapy, Dialectical Behavior Therapy, Mindfulness-based Strategies, Motivational Interviewing, and Supportive Psychotherapy    Substance Abuse was addressed during this session. If the client is diagnosed with a co-occurring substance use disorder, please indicate any changes in the frequency or amount of use: contemplating smoking cessation.. Stage of change for addressing substance use diagnoses: Contemplation    ASSESSMENT:  Bill Haji presents with a Anxious and Dysthymic mood.     his affect is Normal range and intensity, which is congruent, with his mood and the content of the session. The client has made progress on their  "goals.    Bill Haji presents with a none risk of suicide, none risk of self-harm, and none risk of harm to others.    For any risk assessment that surpasses a \"low\" rating, a safety plan must be developed.    A safety plan was indicated: no  If yes, describe in detail NA    PLAN: Between sessions, Bill Haji will continue to work on above issues and treatment goals.  Client remains engaged and participative in session.  Client is appreciative of treatment support.  . At the next session, the therapist will use Cognitive Behavioral Therapy, Dialectical Behavior Therapy, Mindfulness-based Strategies, Motivational Interviewing, and Supportive Psychotherapy to address same.  RS q 6 weeks for maintenance support per clt request, clinical need and lack of social supports.    Behavioral Health Treatment Plan and Discharge Planning: Bill Haji is aware of and agrees to continue to work on their treatment plan. They have identified and are working toward their discharge goals. yes    Depression Follow-up Plan Completed: Not applicable    Visit start and stop times:    03/10/25  Start Time: 1004  Stop Time: 1049  Total Visit Time: 45 minutes  "

## 2025-04-17 NOTE — PSYCH
MEDICATION MANAGEMENT NOTE    Name: Bill Haji      : 1965      MRN: [id number]  Encounter Provider: Raysa Lynch PA-C  Encounter Date: 2025   Encounter department: [facility] OUTPAT[facility]    Insurance: Payor: [facility] / Plan: [employer]ICA[employer] / Product Type: Medicaid HMO /      Reason for Visit:   Chief Complaint   Patient presents with    Medication Management   :  Assessment & Plan  Undifferentiated schizophrenia (HCC)  Generally stable   Continue amitriptyline 50 mg at bedtime as needed for sleep  Continue Cymbalta 60 mg BID to continue to help with mood  Continue Risperdal 1 mg in the morning and 4 mg at bedtime to continue to help with mood stabilization and hallucinations  Recommended continuation of outpatient therapy at [facility]          Depression, unspecified depression type  Generally stable   Continue amitriptyline 50 mg at bedtime as needed for sleep  Continue Cymbalta 60 mg BID to continue to help with mood  Continue Risperdal 1 mg in the morning and 4 mg at bedtime to continue to help with mood stabilization and hallucinations  Recommended continuation of outpatient therapy at [facility]          Generalized anxiety disorder  Generally stable   Continue amitriptyline 50 mg at bedtime as needed for sleep  Continue Cymbalta 60 mg BID to continue to help with mood  Continue Risperdal 1 mg in the morning and 4 mg at bedtime to continue to help with mood stabilization and hallucinations  Recommended continuation of outpatient therapy at [facility]     Orders:    amitriptyline (ELAVIL) 50 mg tablet; Amitriptyline 50m tablet po at night PRN    DULoxetine (CYMBALTA) 60 mg delayed release capsule; Take 1 capsule (60 mg total) by mouth 2 (two) times a day Duloxetine 60m capsule po twice daily    Trauma and stressor-related disorder  Generally stable   Continue amitriptyline 50 mg at bedtime as  needed for sleep  Continue Cymbalta 60 mg BID to continue to help with mood  Continue Risperdal 1 mg in the morning and 4 mg at bedtime to continue to help with mood stabilization and hallucinations  Recommended continuation of outpatient therapy at [facility]            Assessment/Plan:      Impression:  Depression, unspecified - generally stable   Generalized anxiety disorder - generally stable   Schizophrenia - generally stable    Tobacco use disorder, moderate, dependence  Trauma and stressor related disorder - variable      Continue amitriptyline 50 mg at bedtime as needed for sleep  Continue Cymbalta 60 mg BID to continue to help with mood  Continue Risperdal 1 mg in the morning and 4 mg at bedtime to continue to help with mood stabilization and hallucinations  Recommended continuation of outpatient therapy at [facility]   Medical follow up with PCP as needed  Follow up in 3 months    Treatment Recommendations:    Educated about diagnosis and treatment modalities. Verbalizes understanding and agreement with the treatment plan.  Discussed self monitoring of symptoms, and symptom monitoring tools.  Discussed medications and if treatment adjustment was needed or desired.  Aware of 24 hour and weekend coverage for urgent situations accessed by calling [facility] main practice number  I am scheduling this patient out for greater than 3 months: No    Medications Risks/Benefits:      Risks, Benefits And Possible Side Effects Of Medications:    Risks, benefits, and possible side effects of medications explained to Bill and he (or legal representative) verbalizes understanding and agreement for treatment.    Controlled Medication Discussion:     Not applicable - controlled prescriptions are not prescribed by this practice.      History of Present Illness     Subjective:  Medication compliance: yes  Medication side effects: denies      Bill is a 59 y/o male being seen for  "medication management today. Patient has psychiatric diagnoses including depression, generalized anxiety disorder, schizophrenia, and trauma and stressor related disorder. Patient is currently being prescribed amitriptyline 50 mg at bedtime as needed, Cymbalta 60 mg BID, and Risperdal 1 mg in the morning and 4 mg at bedtime. Patient is currently connected to outpatient therapy at [facility]. No additional services in place at this time.    Patient presents today reporting \"good\" mood. Bill reports that he recently had a tooth pulled and he is waiting on a partial implant. Sleep has been good, getting about 6-7 hours each night. No issues falling asleep or staying asleep. Energy and motivation levels have been good. He states that he will be celebrating [place] with his brother and his brother's significant other. He is thankful to have his brother as a good support. Bill quit smoking a few weeks ago and this has been going well. He used to smoke cigarettes but switch to cigars and now he has quit. Appetite has been about the same, eating 2 meals and a protein shake daily. Patient denies any significant mood swings, crying spells, irritability, aggression, or elevated moods. Patient rates their depression a 3/10 on a severity scale today and they rate their anxiety a 3/10. He states that his medications continue to work well for him and he does not wish for any changes. Bill continues to go to therapy and this is helpful for him. He is looking forward to visiting his sister in [place] for 2 weeks soon. He does not have any concerns today. Denies SI/HI. Denies access to guns or weapons. Denies AH/VH. Encouraged patient to call the office with any questions or concerns. Bill feels comfortable following up in about 3 months.     Review Of Systems: A review of systems is obtained and is negative except for the pertinent positives listed in HPI/Subjective above.      Current Rating Scores:     Not " Applicable  None completed today.    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 (ascending  rash , swollen throat, dizziness, weakness)    Fluoxetine Itching       Current Outpatient Medications   Medication Instructions    albuterol (PROVENTIL HFA,VENTOLIN HFA) 90 mcg/act inhaler 2 puffs, Daily PRN    alendronate (FOSAMAX) 70 mg, Weekly    amitriptyline (ELAVIL) 50 mg tablet Amitriptyline 50m tablet po at night PRN    Bevespi Aerosphere 9-4.8 MCG/ACT inhaler 2 puffs, 2 times daily    DULoxetine (CYMBALTA) 60 mg, Oral, 2 times daily, Duloxetine 60m capsule po twice daily    fluticasone (FLONASE) 50 mcg/act nasal spray 2 sprays    loratadine (CLARITIN) 10 mg, Daily PRN    risperiDONE (RisperDAL) 1 mg tablet Risperidone 1 mg : 1 tablet in the morning    risperiDONE (RisperDAL) 4 mg tablet Risperidone 4m tablet po night    Stelara 45 MG/0.5ML SOSY subcutaneous injection       Past Psychiatric History:   Hospitalization: early - twice cut off hair intentionally, also wanted to hurt himself  At the time mom call 911, and Bill then signed 201. Stated he was then diagnosised with schizophrenia- was seeing stuff and hearing voices  No IOP or PHP   Therapy - [facility] - every 6 weeks   No hx of violent behavior   No SA or SIB   Past medication trials: Prozac, bupropion, Trileptal (allergies)     Substance Abuse History:  Tobacco hx: Bill smokes cigars - once a week; smoked for 17yrs old (quit in Spring  "2025)  Illicit: occasionally smoke marijuana, about once weekly   Alcohol - \"once a while\"- once every couple months- denies black outs, DUI ,seizures      Traumatic History:   Denies history of physical, verbal, sexual, and emotional abuse.  Traumatic events: dog passed away in July (was 15 years ago),  in 2004    Substance Abuse History:    Tobacco, Alcohol and Drug Use History     Tobacco Use    Smoking status: Every Day     Types: Cigars    Smokeless tobacco: Never    Tobacco comments:     06/14/24 Bill Haji , smoke 6-7 cigars daily.    Vaping Use    Vaping status: Never Used   Substance Use Topics    Alcohol use: Yes     Comment: rarely    Drug use: Yes     Types: Marijuana      Social History:   Educational- highest level of educations 12th grade- diploma  Learning Disorder- \"slow learner\" - was in special classes  Marital Status:  since 2004  Live in [place], [place], living with brother, brother's girlfriend and her son (21 y/o)   Also close with sister (lives in [place]), talk on the phone   Unemployed   Hobbies = reading, walking, listening to music, watching tv, cooking     Social History:    Social History     Socioeconomic History    Marital status: Single     Spouse name: Not on file    Number of children: Not on file    Years of education: Not on file    Highest education level: Not on file   Occupational History    Not on file   Other Topics Concern    Not on file   Social History Narrative    Not on file        Family Psychiatric History:     Family History   Problem Relation Age of Onset    Depression Brother        Medical History Reviewed by provider this encounter:  Tobacco  Allergies  Meds  Problems  Med Hx  Surg Hx  Fam Hx          Objective   There were no vitals taken for this visit.     Mental Status Evaluation:    Appearance age appropriate, casually dressed   Behavior cooperative, calm   Speech normal rate, normal volume, normal pitch, spontaneous "   Mood euthymic   Affect normal range and intensity, appropriate   Thought Processes organized, goal directed   Thought Content no overt delusions   Perceptual Disturbances: no auditory hallucinations, no visual hallucinations   Abnormal Thoughts  Risk Potential Suicidal ideation - None  Homicidal ideation - None  Potential for aggression - No   Orientation oriented to person, place, time/date, and situation   Memory recent and remote memory grossly intact   Consciousness alert and awake   Attention Span Concentration Span attention span and concentration are age appropriate   Intellect appears to be of average intelligence   Insight intact   Judgement intact   Muscle Strength and  Gait normal muscle strength and normal muscle tone, normal gait and normal balance   Motor activity no abnormal movements   Language no difficulty naming common objects, no difficulty repeating a phrase, no difficulty writing a sentence   Fund of Knowledge adequate knowledge of current events  adequate fund of knowledge regarding past history  adequate fund of knowledge regarding vocabulary    Pain none   Pain Scale 0       Laboratory Results: I have personally reviewed all pertinent laboratory/tests results    Recent Labs (last 12 months):   Appointment on 10/08/2024   Component Date Value    WBC 10/08/2024 6.26     RBC 10/08/2024 4.81     Hemoglobin 10/08/2024 14.8     Hematocrit 10/08/2024 45.3     MCV 10/08/2024 94     MCH 10/08/2024 30.8     MCHC 10/08/2024 32.7     RDW 10/08/2024 12.7     MPV 10/08/2024 9.6     Platelets 10/08/2024 299     nRBC 10/08/2024 0     Segmented % 10/08/2024 55     Immature Grans % 10/08/2024 1     Lymphocytes % 10/08/2024 32     Monocytes % 10/08/2024 8     Eosinophils Relative 10/08/2024 3     Basophils Relative 10/08/2024 1     Absolute Neutrophils 10/08/2024 3.52     Absolute Immature Grans 10/08/2024 0.04     Absolute Lymphocytes 10/08/2024 1.99     Absolute Monocytes 10/08/2024 0.50     Eosinophils  Absolute 10/08/2024 0.18     Basophils Absolute 10/08/2024 0.03     Hemoglobin A1C 10/08/2024 6.2 (H)     EAG 10/08/2024 131     TSH 3RD GENERATON 10/08/2024 6.690 (H)     Sodium 10/08/2024 138     Potassium 10/08/2024 4.1     Chloride 10/08/2024 103     CO2 10/08/2024 31     ANION GAP 10/08/2024 4     BUN 10/08/2024 9     Creatinine 10/08/2024 0.78     Glucose, Fasting 10/08/2024 89     Calcium 10/08/2024 9.5     AST 10/08/2024 18     ALT 10/08/2024 16     Alkaline Phosphatase 10/08/2024 55     Total Protein 10/08/2024 6.7     Albumin 10/08/2024 4.1     Total Bilirubin 10/08/2024 0.39     eGFR 10/08/2024 99     Cholesterol 10/08/2024 167     Triglycerides 10/08/2024 66     HDL, Direct 10/08/2024 47     LDL Calculated 10/08/2024 107 (H)     PSA 10/08/2024 0.522     Free T4 10/08/2024 0.78        Suicide/Homicide Risk Assessment:    Risk of Harm to Self:  Based on today's assessment, Bill presents the following risk of harm to self: minimal    Risk of Harm to Others:  Based on today's assessment, Bill presents the following risk of harm to others: minimal    The following interventions are recommended: Continue medication management. No other intervention changes indicated at this time.    Psychotherapy Provided:     Individual psychotherapy provided: No    Treatment Plan:    Completed and signed during the session: Not applicable - Treatment Plan to be completed by [facility] therapist.    Goals: Progress towards Treatment Plan goals - Yes, progressing, as evidenced by subjective findings in HPI/Subjective Section and in Assessment and Plan Section    Depression Follow-up Plan Completed: Not applicable    Note Share:    This note was shared with patient.    Administrative Statements   Administrative Statements   I have spent a total time of 10 minutes in caring for this patient on the day of the visit/encounter including Prognosis, Risks and benefits of tx options, Instructions for  management, Patient and family education, Importance of tx compliance, and Documenting in the medical record.    Visit Time  Visit Start Time: 0950  Visit Stop Time: 1000  Total Visit Duration:  10 minutes    Raysa Lynch PA-C 04/18/25

## 2025-04-18 ENCOUNTER — OFFICE VISIT (OUTPATIENT)
Dept: PSYCHIATRY | Facility: CLINIC | Age: 60
End: 2025-04-18
Payer: COMMERCIAL

## 2025-04-18 DIAGNOSIS — F41.1 GENERALIZED ANXIETY DISORDER: ICD-10-CM

## 2025-04-18 DIAGNOSIS — F20.9 SCHIZOPHRENIA, UNSPECIFIED TYPE (HCC): ICD-10-CM

## 2025-04-18 DIAGNOSIS — F32.A DEPRESSION, UNSPECIFIED DEPRESSION TYPE: ICD-10-CM

## 2025-04-18 DIAGNOSIS — F20.3 UNDIFFERENTIATED SCHIZOPHRENIA (HCC): Primary | ICD-10-CM

## 2025-04-18 DIAGNOSIS — F43.9 TRAUMA AND STRESSOR-RELATED DISORDER: ICD-10-CM

## 2025-04-18 PROCEDURE — 99214 OFFICE O/P EST MOD 30 MIN: CPT

## 2025-04-18 RX ORDER — RISPERIDONE 4 MG/1
TABLET ORAL
Qty: 90 TABLET | Refills: 1 | Status: SHIPPED | OUTPATIENT
Start: 2025-04-18

## 2025-04-18 RX ORDER — DULOXETIN HYDROCHLORIDE 60 MG/1
60 CAPSULE, DELAYED RELEASE ORAL 2 TIMES DAILY
Qty: 180 CAPSULE | Refills: 1 | Status: SHIPPED | OUTPATIENT
Start: 2025-04-18

## 2025-04-18 RX ORDER — RISPERIDONE 1 MG/1
TABLET ORAL
Qty: 90 TABLET | Refills: 1 | Status: SHIPPED | OUTPATIENT
Start: 2025-04-18

## 2025-04-18 RX ORDER — AMITRIPTYLINE HYDROCHLORIDE 50 MG/1
TABLET ORAL
Qty: 90 TABLET | Refills: 1 | Status: SHIPPED | OUTPATIENT
Start: 2025-04-18

## 2025-04-18 NOTE — ASSESSMENT & PLAN NOTE
Generally stable   Continue amitriptyline 50 mg at bedtime as needed for sleep  Continue Cymbalta 60 mg BID to continue to help with mood  Continue Risperdal 1 mg in the morning and 4 mg at bedtime to continue to help with mood stabilization and hallucinations  Recommended continuation of outpatient therapy at [facility]

## 2025-04-18 NOTE — ASSESSMENT & PLAN NOTE
Generally stable   Continue amitriptyline 50 mg at bedtime as needed for sleep  Continue Cymbalta 60 mg BID to continue to help with mood  Continue Risperdal 1 mg in the morning and 4 mg at bedtime to continue to help with mood stabilization and hallucinations  Recommended continuation of outpatient therapy at [facility]

## 2025-04-18 NOTE — ASSESSMENT & PLAN NOTE
Generally stable   Continue amitriptyline 50 mg at bedtime as needed for sleep  Continue Cymbalta 60 mg BID to continue to help with mood  Continue Risperdal 1 mg in the morning and 4 mg at bedtime to continue to help with mood stabilization and hallucinations  Recommended continuation of outpatient therapy at [facility]

## 2025-04-18 NOTE — ASSESSMENT & PLAN NOTE
Generally stable   Continue amitriptyline 50 mg at bedtime as needed for sleep  Continue Cymbalta 60 mg BID to continue to help with mood  Continue Risperdal 1 mg in the morning and 4 mg at bedtime to continue to help with mood stabilization and hallucinations  Recommended continuation of outpatient therapy at [facility]     Orders:    amitriptyline (ELAVIL) 50 mg tablet; Amitriptyline 50m tablet po at night PRN    DULoxetine (CYMBALTA) 60 mg delayed release capsule; Take 1 capsule (60 mg total) by mouth 2 (two) times a day Duloxetine 60m capsule po twice daily

## 2025-04-22 ENCOUNTER — TELEPHONE (OUTPATIENT)
Age: 60
End: 2025-04-22

## 2025-04-22 ENCOUNTER — SOCIAL WORK (OUTPATIENT)
Dept: BEHAVIORAL/MENTAL HEALTH CLINIC | Facility: CLINIC | Age: 60
End: 2025-04-22
Payer: COMMERCIAL

## 2025-04-22 DIAGNOSIS — F41.1 GENERALIZED ANXIETY DISORDER: ICD-10-CM

## 2025-04-22 DIAGNOSIS — F20.3 UNDIFFERENTIATED SCHIZOPHRENIA (HCC): Primary | ICD-10-CM

## 2025-04-22 PROCEDURE — 90832 PSYTX W PT 30 MINUTES: CPT | Performed by: COUNSELOR

## 2025-04-22 NOTE — PSYCH
Behavioral Health Psychotherapy Progress Note    Psychotherapy Provided: Individual Psychotherapy     1. Undifferentiated schizophrenia (HCC)        2. Generalized anxiety disorder            Goals addressed in session: Goal 1     DATA:     Client shared about current events and status.  Reviewed treatment goals.  Explored any barriers to change and how to address them to move forward. Session focused on current stressors.  Bct brought client late to session, so duration was short today.  Ct. Struggles with some mild continence issues and gets stressed around this.  Going to sister's sat in [place] for 2 weeks.  5 hour drive, brother taking him residential.  Discussed wearing depends as backup plan to ease his worry.  Ct. Reports doing well overall.  One frustration is re: camera placed in living room by brother's GF Valentina.  Explored possible reasons for her need to check in on the house.  Attached likely anxiety rather than client having done anything to need monitoring.  Explored how to discuss if bothers him.  Celebrated successes and growth where noted.  Continue to process feelings, build insights and reinforce coping skills.      During this session, this clinician used the following therapeutic modalities: Cognitive Behavioral Therapy, Dialectical Behavior Therapy, Mindfulness-based Strategies, Motivational Interviewing, and Supportive Psychotherapy    Substance Abuse was not addressed during this session. If the client is diagnosed with a co-occurring substance use disorder, please indicate any changes in the frequency or amount of use:  Tobacco use not discussed today.     ASSESSMENT:  Bill Haji presents with a Euthymic/ normal mood.  his affect is mildly Constricted, which is congruent, with his mood and the content of the session. The client has made progress on their goals.  Bill Haji presents with a none risk of suicide, none risk of self-harm, and none risk of harm to others.  Client remains engaged  "and participative in session.  Client is appreciative of treatment support.      For any risk assessment that surpasses a \"low\" rating, a safety plan must be developed.    A safety plan was indicated: no  If yes, describe in detail NA    PLAN: Between sessions, Bill Haji will continue to work on above issues and treatment goals.  . At the next session, the therapist will use Cognitive Behavioral Therapy, Dialectical Behavior Therapy, Mindfulness-based Strategies, Motivational Interviewing, and Supportive Psychotherapy to address same.  RS q 6 to 8 weeks as needed for maintenance and scheduling challenges.    Behavioral Health Treatment Plan and Discharge Planning: Bill Haji is aware of and agrees to continue to work on their treatment plan. They have identified and are working toward their discharge goals. yes    Depression Follow-up Plan Completed: Yes Ct will continue with treatment plan, including regular counseling, and medication management visits as needed.  Ct will access warm lines or crisis supports that have been given, when needed.      Visit start and stop times:    04/22/25  Start Time: 1023  Stop Time: 1100  Total Visit Time: 37 minutes  "

## 2025-04-22 NOTE — TELEPHONE ENCOUNTER
Patient calling to verify if upcoming appointments were virtual or in person.    Writer reviewed all upcoming appointments are scheduled for in office.

## 2025-05-16 ENCOUNTER — APPOINTMENT (OUTPATIENT)
Dept: LAB | Facility: HOSPITAL | Age: 60
End: 2025-05-16
Payer: MEDICARE

## 2025-05-23 DIAGNOSIS — Z79.899 ENCOUNTER FOR LONG-TERM (CURRENT) USE OF MEDICATIONS: Primary | ICD-10-CM

## 2025-06-05 ENCOUNTER — HOSPITAL ENCOUNTER (INPATIENT)
Facility: HOSPITAL | Age: 60
LOS: 3 days | Discharge: HOME HEALTH | DRG: 140 | End: 2025-06-10
Attending: EMERGENCY MEDICINE | Admitting: INTERNAL MEDICINE
Payer: MEDICARE

## 2025-06-05 ENCOUNTER — APPOINTMENT (EMERGENCY)
Dept: RADIOLOGY | Facility: HOSPITAL | Age: 60
DRG: 140 | End: 2025-06-05
Payer: MEDICARE

## 2025-06-05 DIAGNOSIS — K21.00 GASTROESOPHAGEAL REFLUX DISEASE WITH ESOPHAGITIS WITHOUT HEMORRHAGE: ICD-10-CM

## 2025-06-05 DIAGNOSIS — J44.1 COPD WITH ACUTE EXACERBATION (HCC): Primary | ICD-10-CM

## 2025-06-05 PROBLEM — J96.21 ACUTE ON CHRONIC RESPIRATORY FAILURE WITH HYPOXIA (HCC): Status: ACTIVE | Noted: 2025-06-05

## 2025-06-05 LAB
ALBUMIN SERPL BCG-MCNC: 3.9 G/DL (ref 3.5–5)
ALP SERPL-CCNC: 63 U/L (ref 34–104)
ALT SERPL W P-5'-P-CCNC: 32 U/L (ref 7–52)
ANION GAP SERPL CALCULATED.3IONS-SCNC: 5 MMOL/L (ref 4–13)
AST SERPL W P-5'-P-CCNC: 18 U/L (ref 13–39)
BASE EX.OXY STD BLDV CALC-SCNC: 96.2 % (ref 60–80)
BASE EXCESS BLDV CALC-SCNC: 2.2 MMOL/L
BASOPHILS # BLD AUTO: 0.03 THOUSANDS/ÂΜL (ref 0–0.1)
BASOPHILS NFR BLD AUTO: 0 % (ref 0–1)
BILIRUB SERPL-MCNC: 0.35 MG/DL (ref 0.2–1)
BNP SERPL-MCNC: 26 PG/ML (ref 0–100)
BUN SERPL-MCNC: 19 MG/DL (ref 5–25)
CALCIUM SERPL-MCNC: 9.4 MG/DL (ref 8.4–10.2)
CARDIAC TROPONIN I PNL SERPL HS: 3 NG/L (ref ?–50)
CHLORIDE SERPL-SCNC: 99 MMOL/L (ref 96–108)
CO2 SERPL-SCNC: 29 MMOL/L (ref 21–32)
CREAT SERPL-MCNC: 0.66 MG/DL (ref 0.6–1.3)
D DIMER PPP FEU-MCNC: 0.29 UG/ML FEU
EOSINOPHIL # BLD AUTO: 0 THOUSAND/ÂΜL (ref 0–0.61)
EOSINOPHIL NFR BLD AUTO: 0 % (ref 0–6)
ERYTHROCYTE [DISTWIDTH] IN BLOOD BY AUTOMATED COUNT: 11.9 % (ref 11.6–15.1)
FLUAV RNA RESP QL NAA+PROBE: NEGATIVE
FLUBV RNA RESP QL NAA+PROBE: NEGATIVE
GFR SERPL CREATININE-BSD FRML MDRD: 105 ML/MIN/1.73SQ M
GLUCOSE SERPL-MCNC: 187 MG/DL (ref 65–140)
HCO3 BLDV-SCNC: 27.9 MMOL/L (ref 24–30)
HCT VFR BLD AUTO: 43.7 % (ref 36.5–49.3)
HGB BLD-MCNC: 14.6 G/DL (ref 12–17)
IMM GRANULOCYTES # BLD AUTO: 0.28 THOUSAND/UL (ref 0–0.2)
IMM GRANULOCYTES NFR BLD AUTO: 2 % (ref 0–2)
LYMPHOCYTES # BLD AUTO: 1.08 THOUSANDS/ÂΜL (ref 0.6–4.47)
LYMPHOCYTES NFR BLD AUTO: 8 % (ref 14–44)
MAGNESIUM SERPL-MCNC: 2.1 MG/DL (ref 1.9–2.7)
MCH RBC QN AUTO: 30.8 PG (ref 26.8–34.3)
MCHC RBC AUTO-ENTMCNC: 33.4 G/DL (ref 31.4–37.4)
MCV RBC AUTO: 92 FL (ref 82–98)
MONOCYTES # BLD AUTO: 0.52 THOUSAND/ÂΜL (ref 0.17–1.22)
MONOCYTES NFR BLD AUTO: 4 % (ref 4–12)
NEUTROPHILS # BLD AUTO: 11.19 THOUSANDS/ÂΜL (ref 1.85–7.62)
NEUTS SEG NFR BLD AUTO: 86 % (ref 43–75)
NRBC BLD AUTO-RTO: 0 /100 WBCS
O2 CT BLDV-SCNC: 20.7 ML/DL
PCO2 BLDV: 47.2 MM HG (ref 42–50)
PH BLDV: 7.39 [PH] (ref 7.3–7.4)
PLATELET # BLD AUTO: 410 THOUSANDS/UL (ref 149–390)
PMV BLD AUTO: 9.1 FL (ref 8.9–12.7)
PO2 BLDV: 108.1 MM HG (ref 35–45)
POTASSIUM SERPL-SCNC: 4.3 MMOL/L (ref 3.5–5.3)
PROCALCITONIN SERPL-MCNC: <0.05 NG/ML
PROT SERPL-MCNC: 6.7 G/DL (ref 6.4–8.4)
RBC # BLD AUTO: 4.74 MILLION/UL (ref 3.88–5.62)
RSV RNA RESP QL NAA+PROBE: NEGATIVE
SARS-COV-2 RNA RESP QL NAA+PROBE: NEGATIVE
SODIUM SERPL-SCNC: 133 MMOL/L (ref 135–147)
WBC # BLD AUTO: 13.1 THOUSAND/UL (ref 4.31–10.16)

## 2025-06-05 PROCEDURE — 36415 COLL VENOUS BLD VENIPUNCTURE: CPT | Performed by: EMERGENCY MEDICINE

## 2025-06-05 PROCEDURE — 85025 COMPLETE CBC W/AUTO DIFF WBC: CPT | Performed by: EMERGENCY MEDICINE

## 2025-06-05 PROCEDURE — 82805 BLOOD GASES W/O2 SATURATION: CPT | Performed by: EMERGENCY MEDICINE

## 2025-06-05 PROCEDURE — 84484 ASSAY OF TROPONIN QUANT: CPT | Performed by: EMERGENCY MEDICINE

## 2025-06-05 PROCEDURE — 83735 ASSAY OF MAGNESIUM: CPT | Performed by: EMERGENCY MEDICINE

## 2025-06-05 PROCEDURE — 80053 COMPREHEN METABOLIC PANEL: CPT | Performed by: EMERGENCY MEDICINE

## 2025-06-05 PROCEDURE — 93005 ELECTROCARDIOGRAM TRACING: CPT

## 2025-06-05 PROCEDURE — 96374 THER/PROPH/DIAG INJ IV PUSH: CPT

## 2025-06-05 PROCEDURE — 71045 X-RAY EXAM CHEST 1 VIEW: CPT

## 2025-06-05 PROCEDURE — 99285 EMERGENCY DEPT VISIT HI MDM: CPT

## 2025-06-05 PROCEDURE — 83880 ASSAY OF NATRIURETIC PEPTIDE: CPT | Performed by: EMERGENCY MEDICINE

## 2025-06-05 PROCEDURE — 85379 FIBRIN DEGRADATION QUANT: CPT | Performed by: EMERGENCY MEDICINE

## 2025-06-05 PROCEDURE — 99291 CRITICAL CARE FIRST HOUR: CPT | Performed by: EMERGENCY MEDICINE

## 2025-06-05 PROCEDURE — 84145 PROCALCITONIN (PCT): CPT | Performed by: EMERGENCY MEDICINE

## 2025-06-05 PROCEDURE — 96361 HYDRATE IV INFUSION ADD-ON: CPT

## 2025-06-05 PROCEDURE — 99223 1ST HOSP IP/OBS HIGH 75: CPT

## 2025-06-05 PROCEDURE — 0241U HB NFCT DS VIR RESP RNA 4 TRGT: CPT | Performed by: EMERGENCY MEDICINE

## 2025-06-05 RX ORDER — AMITRIPTYLINE HYDROCHLORIDE 50 MG/1
50 TABLET ORAL
Status: DISCONTINUED | OUTPATIENT
Start: 2025-06-05 | End: 2025-06-10 | Stop reason: HOSPADM

## 2025-06-05 RX ORDER — IPRATROPIUM BROMIDE AND ALBUTEROL SULFATE .5; 3 MG/3ML; MG/3ML
1 SOLUTION RESPIRATORY (INHALATION) ONCE
Status: COMPLETED | OUTPATIENT
Start: 2025-06-05 | End: 2025-06-05

## 2025-06-05 RX ORDER — ENOXAPARIN SODIUM 100 MG/ML
40 INJECTION SUBCUTANEOUS DAILY
Status: DISCONTINUED | OUTPATIENT
Start: 2025-06-06 | End: 2025-06-10 | Stop reason: HOSPADM

## 2025-06-05 RX ORDER — RISPERIDONE 0.5 MG/1
1 TABLET ORAL EVERY MORNING
Status: DISCONTINUED | OUTPATIENT
Start: 2025-06-06 | End: 2025-06-10 | Stop reason: HOSPADM

## 2025-06-05 RX ORDER — FLUTICASONE PROPIONATE 50 MCG
2 SPRAY, SUSPENSION (ML) NASAL DAILY
Status: DISCONTINUED | OUTPATIENT
Start: 2025-06-06 | End: 2025-06-10 | Stop reason: HOSPADM

## 2025-06-05 RX ORDER — ACETAMINOPHEN 325 MG/1
650 TABLET ORAL EVERY 4 HOURS PRN
Status: DISCONTINUED | OUTPATIENT
Start: 2025-06-05 | End: 2025-06-10 | Stop reason: HOSPADM

## 2025-06-05 RX ORDER — ONDANSETRON 2 MG/ML
4 INJECTION INTRAMUSCULAR; INTRAVENOUS EVERY 6 HOURS PRN
Status: DISCONTINUED | OUTPATIENT
Start: 2025-06-05 | End: 2025-06-10 | Stop reason: HOSPADM

## 2025-06-05 RX ORDER — DULOXETIN HYDROCHLORIDE 30 MG/1
60 CAPSULE, DELAYED RELEASE ORAL 2 TIMES DAILY
Status: DISCONTINUED | OUTPATIENT
Start: 2025-06-06 | End: 2025-06-10 | Stop reason: HOSPADM

## 2025-06-05 RX ORDER — METHYLPREDNISOLONE SODIUM SUCCINATE 40 MG/ML
40 INJECTION, POWDER, LYOPHILIZED, FOR SOLUTION INTRAMUSCULAR; INTRAVENOUS EVERY 8 HOURS
Status: DISCONTINUED | OUTPATIENT
Start: 2025-06-06 | End: 2025-06-06

## 2025-06-05 RX ORDER — SODIUM CHLORIDE FOR INHALATION 0.9 %
3 VIAL, NEBULIZER (ML) INHALATION ONCE
Status: COMPLETED | OUTPATIENT
Start: 2025-06-05 | End: 2025-06-05

## 2025-06-05 RX ORDER — RISPERIDONE 2 MG/1
4 TABLET ORAL
Status: DISCONTINUED | OUTPATIENT
Start: 2025-06-05 | End: 2025-06-10 | Stop reason: HOSPADM

## 2025-06-05 RX ORDER — ALBUTEROL SULFATE 5 MG/ML
10 SOLUTION RESPIRATORY (INHALATION) ONCE
Status: COMPLETED | OUTPATIENT
Start: 2025-06-05 | End: 2025-06-05

## 2025-06-05 RX ORDER — DEXAMETHASONE SODIUM PHOSPHATE 10 MG/ML
10 INJECTION, SOLUTION INTRAMUSCULAR; INTRAVENOUS ONCE
Status: COMPLETED | OUTPATIENT
Start: 2025-06-05 | End: 2025-06-05

## 2025-06-05 RX ORDER — ACETAMINOPHEN 325 MG/1
975 TABLET ORAL ONCE
Status: COMPLETED | OUTPATIENT
Start: 2025-06-05 | End: 2025-06-05

## 2025-06-05 RX ORDER — LORATADINE 10 MG/1
10 TABLET ORAL DAILY PRN
Status: DISCONTINUED | OUTPATIENT
Start: 2025-06-05 | End: 2025-06-10 | Stop reason: HOSPADM

## 2025-06-05 RX ADMIN — AMITRIPTYLINE HYDROCHLORIDE 50 MG: 50 TABLET, FILM COATED ORAL at 23:24

## 2025-06-05 RX ADMIN — ACETAMINOPHEN 650 MG: 325 TABLET, FILM COATED ORAL at 23:31

## 2025-06-05 RX ADMIN — AZITHROMYCIN DIHYDRATE 500 MG: 500 INJECTION, POWDER, LYOPHILIZED, FOR SOLUTION INTRAVENOUS at 23:25

## 2025-06-05 RX ADMIN — DEXAMETHASONE SODIUM PHOSPHATE 10 MG: 10 INJECTION, SOLUTION INTRAMUSCULAR; INTRAVENOUS at 20:04

## 2025-06-05 RX ADMIN — IPRATROPIUM BROMIDE 0.5 MG: 0.5 SOLUTION RESPIRATORY (INHALATION) at 19:53

## 2025-06-05 RX ADMIN — ALBUTEROL SULFATE 10 MG: 2.5 SOLUTION RESPIRATORY (INHALATION) at 19:53

## 2025-06-05 RX ADMIN — SODIUM CHLORIDE 500 ML: 0.9 INJECTION, SOLUTION INTRAVENOUS at 20:03

## 2025-06-05 RX ADMIN — RISPERIDONE 4 MG: 2 TABLET, FILM COATED ORAL at 23:24

## 2025-06-05 RX ADMIN — ACETAMINOPHEN 975 MG: 325 TABLET, FILM COATED ORAL at 19:55

## 2025-06-05 RX ADMIN — ISODIUM CHLORIDE 3 ML: 0.03 SOLUTION RESPIRATORY (INHALATION) at 19:53

## 2025-06-06 PROBLEM — F17.210 DEPENDENCE ON NICOTINE FROM CIGARETTES: Status: ACTIVE | Noted: 2022-09-12

## 2025-06-06 LAB
ANION GAP SERPL CALCULATED.3IONS-SCNC: 5 MMOL/L (ref 4–13)
ATRIAL RATE: 126 BPM
BUN SERPL-MCNC: 15 MG/DL (ref 5–25)
CALCIUM SERPL-MCNC: 8.9 MG/DL (ref 8.4–10.2)
CHLORIDE SERPL-SCNC: 102 MMOL/L (ref 96–108)
CO2 SERPL-SCNC: 29 MMOL/L (ref 21–32)
CREAT SERPL-MCNC: 0.55 MG/DL (ref 0.6–1.3)
ERYTHROCYTE [DISTWIDTH] IN BLOOD BY AUTOMATED COUNT: 11.9 % (ref 11.6–15.1)
GFR SERPL CREATININE-BSD FRML MDRD: 114 ML/MIN/1.73SQ M
GLUCOSE SERPL-MCNC: 120 MG/DL (ref 65–140)
HCT VFR BLD AUTO: 39.9 % (ref 36.5–49.3)
HGB BLD-MCNC: 13.1 G/DL (ref 12–17)
MAGNESIUM SERPL-MCNC: 2.3 MG/DL (ref 1.9–2.7)
MCH RBC QN AUTO: 30.8 PG (ref 26.8–34.3)
MCHC RBC AUTO-ENTMCNC: 32.8 G/DL (ref 31.4–37.4)
MCV RBC AUTO: 94 FL (ref 82–98)
P AXIS: 79 DEGREES
PHOSPHATE SERPL-MCNC: 3.2 MG/DL (ref 2.7–4.5)
PLATELET # BLD AUTO: 350 THOUSANDS/UL (ref 149–390)
PMV BLD AUTO: 8.7 FL (ref 8.9–12.7)
POTASSIUM SERPL-SCNC: 4.2 MMOL/L (ref 3.5–5.3)
PR INTERVAL: 138 MS
QRS AXIS: 88 DEGREES
QRSD INTERVAL: 82 MS
QT INTERVAL: 300 MS
QTC INTERVAL: 434 MS
RBC # BLD AUTO: 4.26 MILLION/UL (ref 3.88–5.62)
SODIUM SERPL-SCNC: 136 MMOL/L (ref 135–147)
T WAVE AXIS: 76 DEGREES
VENTRICULAR RATE: 126 BPM
WBC # BLD AUTO: 14.21 THOUSAND/UL (ref 4.31–10.16)

## 2025-06-06 PROCEDURE — 99232 SBSQ HOSP IP/OBS MODERATE 35: CPT | Performed by: INTERNAL MEDICINE

## 2025-06-06 PROCEDURE — 99255 IP/OBS CONSLTJ NEW/EST HI 80: CPT | Performed by: INTERNAL MEDICINE

## 2025-06-06 PROCEDURE — 94760 N-INVAS EAR/PLS OXIMETRY 1: CPT

## 2025-06-06 PROCEDURE — 94640 AIRWAY INHALATION TREATMENT: CPT

## 2025-06-06 PROCEDURE — 80048 BASIC METABOLIC PNL TOTAL CA: CPT | Performed by: INTERNAL MEDICINE

## 2025-06-06 PROCEDURE — 85027 COMPLETE CBC AUTOMATED: CPT | Performed by: INTERNAL MEDICINE

## 2025-06-06 PROCEDURE — 83735 ASSAY OF MAGNESIUM: CPT | Performed by: INTERNAL MEDICINE

## 2025-06-06 PROCEDURE — 84100 ASSAY OF PHOSPHORUS: CPT | Performed by: INTERNAL MEDICINE

## 2025-06-06 PROCEDURE — 93010 ELECTROCARDIOGRAM REPORT: CPT | Performed by: INTERNAL MEDICINE

## 2025-06-06 PROCEDURE — 94664 DEMO&/EVAL PT USE INHALER: CPT

## 2025-06-06 RX ORDER — GUAIFENESIN 600 MG/1
600 TABLET, EXTENDED RELEASE ORAL 2 TIMES DAILY
Status: DISCONTINUED | OUTPATIENT
Start: 2025-06-06 | End: 2025-06-10 | Stop reason: HOSPADM

## 2025-06-06 RX ORDER — SODIUM CHLORIDE FOR INHALATION 0.9 %
3 VIAL, NEBULIZER (ML) INHALATION
Status: DISCONTINUED | OUTPATIENT
Start: 2025-06-06 | End: 2025-06-06

## 2025-06-06 RX ORDER — AZITHROMYCIN 500 MG/1
500 TABLET, FILM COATED ORAL EVERY 24 HOURS
Status: COMPLETED | OUTPATIENT
Start: 2025-06-06 | End: 2025-06-07

## 2025-06-06 RX ORDER — BUDESONIDE 0.5 MG/2ML
0.5 INHALANT ORAL
Status: DISCONTINUED | OUTPATIENT
Start: 2025-06-06 | End: 2025-06-07

## 2025-06-06 RX ORDER — METHYLPREDNISOLONE SODIUM SUCCINATE 40 MG/ML
40 INJECTION, POWDER, LYOPHILIZED, FOR SOLUTION INTRAMUSCULAR; INTRAVENOUS EVERY 12 HOURS SCHEDULED
Status: DISCONTINUED | OUTPATIENT
Start: 2025-06-06 | End: 2025-06-10

## 2025-06-06 RX ORDER — LEVALBUTEROL INHALATION SOLUTION 1.25 MG/3ML
1.25 SOLUTION RESPIRATORY (INHALATION)
Status: DISCONTINUED | OUTPATIENT
Start: 2025-06-06 | End: 2025-06-10 | Stop reason: HOSPADM

## 2025-06-06 RX ORDER — ALBUTEROL SULFATE 0.83 MG/ML
2.5 SOLUTION RESPIRATORY (INHALATION) EVERY 6 HOURS PRN
Status: DISCONTINUED | OUTPATIENT
Start: 2025-06-06 | End: 2025-06-06

## 2025-06-06 RX ADMIN — LEVALBUTEROL HYDROCHLORIDE 1.25 MG: 1.25 SOLUTION RESPIRATORY (INHALATION) at 13:51

## 2025-06-06 RX ADMIN — DULOXETINE HYDROCHLORIDE 60 MG: 30 CAPSULE, DELAYED RELEASE ORAL at 17:23

## 2025-06-06 RX ADMIN — GUAIFENESIN 600 MG: 600 TABLET ORAL at 17:23

## 2025-06-06 RX ADMIN — LEVALBUTEROL HYDROCHLORIDE 1.25 MG: 1.25 SOLUTION RESPIRATORY (INHALATION) at 19:34

## 2025-06-06 RX ADMIN — AMITRIPTYLINE HYDROCHLORIDE 50 MG: 50 TABLET, FILM COATED ORAL at 20:59

## 2025-06-06 RX ADMIN — FLUTICASONE PROPIONATE 2 SPRAY: 50 SPRAY, METERED NASAL at 11:06

## 2025-06-06 RX ADMIN — ENOXAPARIN SODIUM 40 MG: 40 INJECTION SUBCUTANEOUS at 10:53

## 2025-06-06 RX ADMIN — METHYLPREDNISOLONE SODIUM SUCCINATE 40 MG: 40 INJECTION, POWDER, FOR SOLUTION INTRAMUSCULAR; INTRAVENOUS at 04:50

## 2025-06-06 RX ADMIN — LEVALBUTEROL HYDROCHLORIDE 1.25 MG: 1.25 SOLUTION RESPIRATORY (INHALATION) at 09:12

## 2025-06-06 RX ADMIN — IPRATROPIUM BROMIDE 0.5 MG: 0.5 SOLUTION RESPIRATORY (INHALATION) at 19:34

## 2025-06-06 RX ADMIN — RISPERIDONE 4 MG: 2 TABLET, FILM COATED ORAL at 20:59

## 2025-06-06 RX ADMIN — BUDESONIDE 0.5 MG: 0.5 INHALANT RESPIRATORY (INHALATION) at 19:34

## 2025-06-06 RX ADMIN — IPRATROPIUM BROMIDE 0.5 MG: 0.5 SOLUTION RESPIRATORY (INHALATION) at 09:12

## 2025-06-06 RX ADMIN — METHYLPREDNISOLONE SODIUM SUCCINATE 40 MG: 40 INJECTION, POWDER, FOR SOLUTION INTRAMUSCULAR; INTRAVENOUS at 21:00

## 2025-06-06 RX ADMIN — AZITHROMYCIN DIHYDRATE 500 MG: 500 TABLET ORAL at 20:59

## 2025-06-06 RX ADMIN — IPRATROPIUM BROMIDE 0.5 MG: 0.5 SOLUTION RESPIRATORY (INHALATION) at 13:51

## 2025-06-06 RX ADMIN — DULOXETINE HYDROCHLORIDE 60 MG: 30 CAPSULE, DELAYED RELEASE ORAL at 10:53

## 2025-06-06 RX ADMIN — GUAIFENESIN 600 MG: 600 TABLET ORAL at 10:53

## 2025-06-06 RX ADMIN — BUDESONIDE 0.5 MG: 0.5 INHALANT RESPIRATORY (INHALATION) at 09:12

## 2025-06-06 RX ADMIN — RISPERIDONE 1 MG: 0.5 TABLET, FILM COATED ORAL at 10:53

## 2025-06-06 NOTE — PROGRESS NOTES
The azithromycin has  been converted to Oral per [facility] IV-to-PO Auto-Conversion Protocol for Adults as approved by the Pharmacy and Therapeutics Committee. The patient met all eligible criteria:  1) Age = 18 years old   2) Received at least one dose of the IV form   3) Receiving at least one other scheduled oral/enteral medication   4) Tolerating an oral/enteral diet   and did not have any exclusions:   1) Critical care patient   2) Active GI bleed (IF assessing H2RAs or PPIs)   3) Continuous tube feeding (IF assessing cipro, doxycycline, levofloxacin, minocycline, rifampin, or voriconazole)   4) Receiving PO vancomycin (IF assessing metronidazole)   5) Persistent nausea and/or vomiting   6) Ileus or gastrointestinal obstruction   7) Sana/nasogastric tube set for continuous suction   8) Specific order not to automatically convert to PO (in the order's comments or if discussed in the most recent Infectious Disease or primary team's progress notes).

## 2025-06-06 NOTE — PLAN OF CARE
Problem: PAIN - ADULT  Goal: Verbalizes/displays adequate comfort level or baseline comfort level  Description: Interventions:  - Encourage patient to monitor pain and request assistance  - Assess pain using appropriate pain scale  - Administer analgesics as ordered based on type and severity of pain and evaluate response  - Implement non-pharmacological measures as appropriate and evaluate response  - Consider cultural and social influences on pain and pain management  - Notify physician/advanced practitioner if interventions unsuccessful or patient reports new pain  - Educate patient/family on pain management process including their role and importance of  reporting pain   - Provide non-pharmacologic/complimentary pain relief interventions  Outcome: Progressing     Problem: Knowledge Deficit  Goal: Patient/family/caregiver demonstrates understanding of disease process, treatment plan, medications, and discharge instructions  Description: Complete learning assessment and assess knowledge base.  Interventions:  - Provide teaching at level of understanding  - Provide teaching via preferred learning methods  Outcome: Progressing     Problem: RESPIRATORY - ADULT  Goal: Achieves optimal ventilation and oxygenation  Description: INTERVENTIONS:  - Assess for changes in respiratory status  - Assess for changes in mentation and behavior  - Position to facilitate oxygenation and minimize respiratory effort  - Oxygen administered by appropriate delivery if ordered  - Initiate smoking cessation education as indicated  - Encourage broncho-pulmonary hygiene including cough, deep breathe, Incentive Spirometry  - Assess the need for suctioning and aspirate as needed  - Assess and instruct to report SOB or any respiratory difficulty  - Respiratory Therapy support as indicated  Outcome: Progressing

## 2025-06-06 NOTE — ASSESSMENT & PLAN NOTE
Recently discharged from the hospital last month on nasal cannula oxygen.  Patient reported at baseline he was on 2 L however had to increase to 3 L nasal cannula oxygen  Was briefly on 4 L nasal cannula oxygen in the emergency department as his oxygen was 87% on 3 L.  In the setting of COPD exacerbation  Wean off to maintain saturation greater than 89%

## 2025-06-06 NOTE — ASSESSMENT & PLAN NOTE
Has gone from smoking cigarettes to cigars and most recently vaping  His brother reports none since hospitalization    Needs to remain complete smoking cessation

## 2025-06-06 NOTE — PLAN OF CARE
Problem: PAIN - ADULT  Goal: Verbalizes/displays adequate comfort level or baseline comfort level  Description: Interventions:  - Encourage patient to monitor pain and request assistance  - Assess pain using appropriate pain scale  - Administer analgesics as ordered based on type and severity of pain and evaluate response  - Implement non-pharmacological measures as appropriate and evaluate response  - Consider cultural and social influences on pain and pain management  - Notify physician/advanced practitioner if interventions unsuccessful or patient reports new pain  - Educate patient/family on pain management process including their role and importance of  reporting pain   - Provide non-pharmacologic/complimentary pain relief interventions  Outcome: Progressing     Problem: INFECTION - ADULT  Goal: Absence or prevention of progression during hospitalization  Description: INTERVENTIONS:  - Assess and monitor for signs and symptoms of infection  - Monitor lab/diagnostic results  - Monitor all insertion sites, i.e. indwelling lines, tubes, and drains  - Monitor endotracheal if appropriate and nasal secretions for changes in amount and color  - Jewett appropriate cooling/warming therapies per order  - Administer medications as ordered  - Instruct and encourage patient and family to use good hand hygiene technique  - Identify and instruct in appropriate isolation precautions for identified infection/condition  Outcome: Progressing  Goal: Absence of fever/infection during neutropenic period  Description: INTERVENTIONS:  - Monitor WBC  - Perform strict hand hygiene  - Limit to healthy visitors only  - No plants, dried, fresh or silk flowers with rice in patient room  Outcome: Progressing     Problem: DISCHARGE PLANNING  Goal: Discharge to home or other facility with appropriate resources  Description: INTERVENTIONS:  - Identify barriers to discharge w/patient and caregiver  - Arrange for needed discharge resources  and transportation as appropriate  - Identify discharge learning needs (meds, wound care, etc.)  - Arrange for interpretive services to assist at discharge as needed  - Refer to Case Management Department for coordinating discharge planning if the patient needs post-hospital services based on physician/advanced practitioner order or complex needs related to functional status, cognitive ability, or social support system  Outcome: Progressing     Problem: Knowledge Deficit  Goal: Patient/family/caregiver demonstrates understanding of disease process, treatment plan, medications, and discharge instructions  Description: Complete learning assessment and assess knowledge base.  Interventions:  - Provide teaching at level of understanding  - Provide teaching via preferred learning methods  Outcome: Progressing     Problem: RESPIRATORY - ADULT  Goal: Achieves optimal ventilation and oxygenation  Description: INTERVENTIONS:  - Assess for changes in respiratory status  - Assess for changes in mentation and behavior  - Position to facilitate oxygenation and minimize respiratory effort  - Oxygen administered by appropriate delivery if ordered  - Initiate smoking cessation education as indicated  - Encourage broncho-pulmonary hygiene including cough, deep breathe, Incentive Spirometry  - Assess the need for suctioning and aspirate as needed  - Assess and instruct to report SOB or any respiratory difficulty  - Respiratory Therapy support as indicated  Outcome: Progressing

## 2025-06-06 NOTE — ASSESSMENT & PLAN NOTE
Presents with shortness of breath and dyspnea on exertion with wheezing, retractions.  Recently discharged from [facility] days ago.  Started on 3 L oxygen chronically at that time.  Given DuoNebs in ambulance and levy marge in ED but remains hypoxic with oxygen saturation in 80s when ambulating.  VBG largely WNL.  D-dimer WNL.  Respiratory protocol.  Solu-Medrol and DuoNebs.  Azithromycin for exacerbation.

## 2025-06-06 NOTE — ASSESSMENT & PLAN NOTE
Recently started on 3L O2  Needed 4L briefly in the ED    Plan:  Supplemental oxygen to maintain sats >88%

## 2025-06-06 NOTE — ASSESSMENT & PLAN NOTE
Continue Solu-Medrol, but okay to wean to every 12 hours dosing  Will continue Pulmicort nebulizer twice daily as well as Xopenex/Atrovent 3 times daily.  He has not had any reactions here to albuterol or Xopenex (per recent admission, the allergic reaction was tachycardia to albuterol).  Will monitor clinically.  Continue Zithromax in the setting of COPD exacerbation    Likely at time of discharge we will discharge home on Spiriva daily with Xopenex as needed  Would benefit from pulmonary rehabilitation

## 2025-06-06 NOTE — RESPIRATORY THERAPY NOTE
"RT Protocol Note  Bill Haji 60 y.o. male MRN: [id number]  Unit/Bed#: -01 Encounter: [id number]    Assessment    Principal Problem:    COPD with acute exacerbation (HCC)  Active Problems:    Schizophrenia (HCC)    Generalized anxiety disorder    Acute on chronic respiratory failure with hypoxia (HCC)      Home Pulmonary Medications:    Home Devices/Therapy:  (Inhaler PRN)    Past Medical History[1]  Social History[2]    Subjective         Objective    Physical Exam:   Assessment Type: Assess only  General Appearance: Awake  Respiratory Pattern: Tachypneic, Dyspnea with exertion, Labored, Accessory muscle use  Chest Assessment: Chest expansion symmetrical  Bilateral Breath Sounds: Diminished, Expiratory wheezes  O2 Device: NC 3L    Vitals:  Blood pressure 138/84, pulse (!) 108, temperature 98.1 °F (36.7 °C), temperature source Oral, resp. rate 18, height 5' 3\" (1.6 m), weight 47.9 kg (105 lb 9.6 oz), SpO2 91%.          Imaging and other studies: Results Review Statement: No pertinent imaging studies reviewed.    O2 Device: NC 3L     Plan    Respiratory Plan: Moderate/Severe Distress pathway, Home Bronchodilator Patient pathway        Resp Comments: PT has HX of COPD/tobacco abuse. Takes albuterol inhaler PRN. PRN added. SATS appropriate on 3L NC at this time. PT admitted with COPD exacerbation        [1]   Past Medical History:  Diagnosis Date    Asthma     Bipolar 1 disorder (HCC)     COPD (chronic obstructive pulmonary disease) (HCC)     Emphysema lung (HCC)     Hypertension    [2]   Social History  Socioeconomic History    Marital status: Single   Tobacco Use    Smoking status: Former     Types: Cigars    Smokeless tobacco: Never    Tobacco comments:     06/14/24 Bill Haji , smoke 6-7 cigars daily.    Vaping Use    Vaping status: Never Used   Substance and Sexual Activity    Alcohol use: Yes     Comment: rarely    Drug use: Yes     Types: Marijuana     Social Drivers of Health     Food Insecurity: " No Food Insecurity (2025)    Nursing - Inadequate Food Risk Classification     Ran Out of Food in the Last Year: Never true   Transportation Needs: No Transportation Needs (2025)    Nursing - Transportation Risk Classification     Lack of Transportation: No   Intimate Partner Violence: Unknown (2025)    Nursing IPS     Physically Hurt by Someone: No     Hurt or Threatened by Someone: No   Housing Stability: Unknown (2025)    Nursing: Inadequate Housing Risk Classification     Unable to Pay for Housing in the Last Year: No     Has Housin

## 2025-06-06 NOTE — ED PROVIDER NOTES
Time reflects when diagnosis was documented in both MDM as applicable and the Disposition within this note       Time User Action Codes Description Comment    6/5/2025 10:30 PM Alberto Rene Add [J44.1] COPD with acute exacerbation (HCC)           ED Disposition       ED Disposition   Admit    Condition   Stable    Date/Time   Thu Jun 5, 2025 10:30 PM    Comment   Case was discussed with TARYN Jimenez and the patient's admission status was agreed to be Admission Status: observation status to the service of Dr. Preston .               Assessment & Plan       Medical Decision Making  Diff includes COPD acute exacerbation, less likely pneumonia, CHF, coronary syndrome, PE    Resp distress upon arrival to ER - given SAUCEDO neb and IV steroids - symptoms improved, frequent reassesment in ER.  Reviewed records - recent normal CT chest 5-25-25.  D-dimer negative today.  No infection noted on imaging.  Hypoxia to 87% and worsening SOB on ambulation in ER after SAUCEDO neb    Critical Care Time Statement: Upon my evaluation, this patient had a high probability of imminent or life-threatening deterioration due to resp distress, which required my direct attention, intervention, and personal management.  I spent a total of 40 minutes directly providing critical care services, including interpretation of complex medical databases, evaluating for the presence of life-threatening injuries or illnesses, management of organ system failure(s) , complex medical decision making (to support/prevent further life-threatening deterioration)., and interpretation of hemodynamic data. This time is exclusive of procedures, teaching, treating other patients, family meetings, and any prior time recorded by providers other than myself.       Amount and/or Complexity of Data Reviewed  Labs: ordered.  Radiology: ordered and independent interpretation performed.    Risk  OTC drugs.  Prescription drug management.  Decision regarding hospitalization.              Medications   ipratropium-albuterol (FOR EMS ONLY) (DUO-NEB) 0.5-2.5 mg/3 mL inhalation solution 3 mL (0 mL Does not apply Given to EMS 6/5/25 1943)   albuterol inhalation solution 10 mg (10 mg Nebulization Given 6/5/25 1953)     And   ipratropium (ATROVENT) 0.02 % inhalation solution 0.5 mg (0.5 mg Nebulization Given 6/5/25 1953)     And   sodium chloride 0.9 % inhalation solution 3 mL (3 mL Nebulization Given 6/5/25 1953)   dexamethasone (PF) (DECADRON) injection 10 mg (10 mg Intravenous Given 6/5/25 2004)   sodium chloride 0.9 % bolus 500 mL (0 mL Intravenous Stopped 6/5/25 2103)   acetaminophen (TYLENOL) tablet 975 mg (975 mg Oral Given 6/5/25 1955)       ED Risk Strat Scores                    No data recorded    PERC Rule for PE      Flowsheet Row Most Recent Value   PERC Rule for PE    Age >=50 1 Filed at: 06/05/2025 2147   HR >=100 --   O2 Sat on room air < 95% --   History of PE or DVT --   Recent trauma or surgery --   Hemoptysis --   Exogenous estrogen --   Unilateral leg swelling --   PERC Rule for PE Results 1 Filed at: 06/05/2025 2147                Wells' Criteria for PE      Flowsheet Row Most Recent Value   Wells' Criteria for PE    Clinical signs and symptoms of DVT 0 Filed at: 06/05/2025 2147   PE is primary diagnosis or equally likely 0 Filed at: 06/05/2025 2147   HR >100 1.5 Filed at: 06/05/2025 2147   Immobilization at least 3 days or Surgery in the previous 4 weeks 0 Filed at: 06/05/2025 2147   Previous, objectively diagnosed PE or DVT 0 Filed at: 06/05/2025 2147   Hemoptysis 0 Filed at: 06/05/2025 2147   Malignancy with treatment within 6 months or palliative 0 Filed at: 06/05/2025 2147   Kei' Criteria Total 1.5 Filed at: 06/05/2025 2147                        History of Present Illness       Chief Complaint   Patient presents with    Shortness of Breath     EMS from home; just discharged from [facility] 6/3 for COPD exacerbation, felt worse throughout the day today, on 3L NC at home  baseline, EMS gave dutayler sorianoroute       Past Medical History[1]   Past Surgical History[2]   Family History[3]   Social History[4]   E-Cigarette/Vaping    E-Cigarette Use Never User       E-Cigarette/Vaping Substances      I have reviewed and agree with the history as documented.     Hx from patient, family members, paramedics - fadumo from home with SOB couple days now since discharge from [facility] with new 3 l oxygen requirement.  Patient has been using oxygen and albuterol, 2 inhalers today but symptoms severe today, can't ambulate due to sob and sob even just resting today.  Sternal pain when breathes in and out.  Associated cough, no fever.  Associates sore throat, hx of schizophrenia and emphysema.          Review of Systems   Constitutional:  Negative for chills and fever.   HENT:  Positive for sore throat. Negative for rhinorrhea.    Respiratory:  Positive for cough and shortness of breath.    Cardiovascular:  Negative for chest pain.   Gastrointestinal:  Negative for constipation, diarrhea, nausea and vomiting.   Genitourinary:  Negative for dysuria and frequency.   Skin:  Negative for rash.   All other systems reviewed and are negative.          Objective       ED Triage Vitals [06/05/25 1940]   Temperature Pulse Blood Pressure Respirations SpO2 Patient Position - Orthostatic VS   98.6 °F (37 °C) (!) 128 148/89 20 94 % Sitting      Temp Source Heart Rate Source BP Location FiO2 (%) Pain Score    Oral Monitor Right arm -- No Pain      Vitals      Date and Time Temp Pulse SpO2 Resp BP Pain Score FACES Pain Rating User   06/06/25 0031 -- -- -- -- -- -- 0 [place]   06/05/25 2345 -- -- -- -- -- 5 -- [place]   06/05/25 2342 98.1 °F (36.7 °C) 108 -- 18 138/84 5 -- [place]   06/05/25 2331 -- -- -- -- -- 5 -- [place]   06/05/25 2303 98.1 °F (36.7 °C) 108 91 % -- 138/84 -- -- DII   06/05/25 2302 98.1 °F (36.7 °C) 108 91 % 18 138/84 -- -- KH   06/05/25 2230 -- 108 90 % 18 128/75 -- -- CG   06/05/25 2213 -- -- -- -- -- No Pain  --    06/05/25 2200 -- 114 90 % 20 141/85 -- --    06/05/25 2144 -- -- 87 % -- -- -- --    06/05/25 2130 -- 120 90 % 24 114/73 -- --    06/05/25 2100 -- 121 96 % during hartneb 22 140/82 -- --    06/05/25 2037 -- -- -- -- -- No Pain --    06/05/25 2030 -- 117 98 % 22 147/96 -- --    06/05/25 1955 -- -- -- -- -- Med Not Given for Pain - for MAR use only --    06/05/25 1940 98.6 °F (37 °C) 128 94 % 20 148/89 No Pain -- MS            Physical Exam  Vitals and nursing note reviewed.   Constitutional:       General: He is in acute distress.      Appearance: He is well-developed.   HENT:      Head: Normocephalic and atraumatic.      Right Ear: External ear normal.      Left Ear: External ear normal.      Nose: Nose normal.     Eyes:      Conjunctiva/sclera: Conjunctivae normal.      Pupils: Pupils are equal, round, and reactive to light.       Cardiovascular:      Rate and Rhythm: Normal rate and regular rhythm.      Heart sounds: Normal heart sounds.   Pulmonary:      Effort: Tachypnea and retractions present. No respiratory distress.      Breath sounds: Wheezing present.   Abdominal:      General: Bowel sounds are normal. There is no distension.      Palpations: Abdomen is soft.      Tenderness: There is no abdominal tenderness.     Musculoskeletal:         General: No deformity. Normal range of motion.      Cervical back: Normal range of motion and neck supple. No spinous process tenderness.     Skin:     General: Skin is warm and dry.      Findings: No rash.     Neurological:      General: No focal deficit present.      Mental Status: He is alert.      GCS: GCS eye subscore is 4. GCS verbal subscore is 5. GCS motor subscore is 6.      Sensory: No sensory deficit.     Psychiatric:         Mood and Affect: Mood normal.         Results Reviewed       Procedure Component Value Units Date/Time    FLU/RSV/COVID - if FLU/RSV clinically relevant (2hr TAT) [[id number]]  (Normal) Collected: 06/05/25 2000    Lab  Status: Final result Specimen: Nares from Nose Updated: 06/05/25 2052     SARS-CoV-2 Negative     INFLUENZA A PCR Negative     INFLUENZA B PCR Negative     RSV PCR Negative    Narrative:      This test has been performed using the CoV-2/Flu/RSV plus assay on the [employer] platform. This test has been validated by the  and verified by the performing laboratory.     This test is designed to amplify and detect the following: nucleocapsid (N), envelope (E), and RNA-dependent RNA polymerase (RdRP) genes of the SARS-CoV-2 genome; matrix (M), basic polymerase (PB2), and acidic protein (PA) segments of the influenza A genome; matrix (M) and non-structural protein (NS) segments of the influenza B genome, and the nucleocapsid genes of RSV A and RSV B.     Positive results are indicative of the presence of Flu A, Flu B, RSV, and/or SARS-CoV-2 RNA. Positive results for SARS-CoV-2 or suspected novel influenza should be reported to state, local, or federal health departments according to local reporting requirements.      All results should be assessed in conjunction with clinical presentation and other laboratory markers for clinical management.     FOR PEDIATRIC PATIENTS - copy/paste COVID Guidelines URL to browser: [email or web address]       Procalcitonin [[id number]]  (Normal) Collected: 06/05/25 2000    Lab Status: Final result Specimen: Blood from Arm, Left Updated: 06/05/25 2039     Procalcitonin <0.05 ng/ml     B-Type Natriuretic Peptide(BNP) [[id number]]  (Normal) Collected: 06/05/25 2000    Lab Status: Final result Specimen: Blood from Arm, Left Updated: 06/05/25 2035     BNP 26 pg/mL     HS Troponin 0hr (reflex protocol) [[id number]]  (Normal) Collected: 06/05/25 2000    Lab Status: Final result Specimen: Blood from Arm, Left Updated: 06/05/25 2035     hs TnI 0hr 3 ng/L     Comprehensive metabolic panel [[id number]]  (Abnormal) Collected: 06/05/25  2000    Lab Status: Final result Specimen: Blood from Arm, Left Updated: 06/05/25 2031     Sodium 133 mmol/L      Potassium 4.3 mmol/L      Chloride 99 mmol/L      CO2 29 mmol/L      ANION GAP 5 mmol/L      BUN 19 mg/dL      Creatinine 0.66 mg/dL      Glucose 187 mg/dL      Calcium 9.4 mg/dL      AST 18 U/L      ALT 32 U/L      Alkaline Phosphatase 63 U/L      Total Protein 6.7 g/dL      Albumin 3.9 g/dL      Total Bilirubin 0.35 mg/dL      eGFR 105 ml/min/1.73sq m     Narrative:      National Kidney Disease Foundation guidelines for Chronic Kidney Disease (CKD):     Stage 1 with normal or high GFR (GFR > 90 mL/min/1.73 square meters)    Stage 2 Mild CKD (GFR = 60-89 mL/min/1.73 square meters)    Stage 3A Moderate CKD (GFR = 45-59 mL/min/1.73 square meters)    Stage 3B Moderate CKD (GFR = 30-44 mL/min/1.73 square meters)    Stage 4 Severe CKD (GFR = 15-29 mL/min/1.73 square meters)    Stage 5 End Stage CKD (GFR <15 mL/min/1.73 square meters)  Note: GFR calculation is accurate only with a steady state creatinine    D-dimer, quantitative [[id number]]  (Normal) Collected: 06/05/25 2000    Lab Status: Final result Specimen: Blood from Arm, Left Updated: 06/05/25 2031     D-Dimer, Quant 0.29 ug/ml FEU     Narrative:      In the evaluation for possible pulmonary embolism, in the appropriate (Well's Score of 4 or less) patient, the age adjusted d-dimer cutoff for this patient can be calculated as:    Age x 0.01 (in ug/mL) for Age-adjusted D-dimer exclusion threshold for a patient over 50 years.    Magnesium [[id number]]  (Normal) Collected: 06/05/25 2000    Lab Status: Final result Specimen: Blood from Arm, Left Updated: 06/05/25 2031     Magnesium 2.1 mg/dL     Blood gas, venous [[id number]]  (Abnormal) Collected: 06/05/25 2000    Lab Status: Final result Specimen: Blood from Arm, Left Updated: 06/05/25 2013     pH, Fran 7.389     pCO2, Fran 47.2 mm Hg      pO2, Fran 108.1 mm Hg      HCO3, Fran 27.9 mmol/L      Base Excess, Fran  2.2 mmol/L      O2 Content, Fran 20.7 ml/dL      O2 HGB, VENOUS 96.2 %     CBC and differential [[id number]]  (Abnormal) Collected: 06/05/25 2000    Lab Status: Final result Specimen: Blood from Arm, Left Updated: 06/05/25 2013     WBC 13.10 Thousand/uL      RBC 4.74 Million/uL      Hemoglobin 14.6 g/dL      Hematocrit 43.7 %      MCV 92 fL      MCH 30.8 pg      MCHC 33.4 g/dL      RDW 11.9 %      MPV 9.1 fL      Platelets 410 Thousands/uL      nRBC 0 /100 WBCs      Segmented % 86 %      Immature Grans % 2 %      Lymphocytes % 8 %      Monocytes % 4 %      Eosinophils Relative 0 %      Basophils Relative 0 %      Absolute Neutrophils 11.19 Thousands/µL      Absolute Immature Grans 0.28 Thousand/uL      Absolute Lymphocytes 1.08 Thousands/µL      Absolute Monocytes 0.52 Thousand/µL      Eosinophils Absolute 0.00 Thousand/µL      Basophils Absolute 0.03 Thousands/µL             XR chest 1 view portable   ED Interpretation by Alberto Rene DO (06/05 2107)   No acute abnl, no ptx, effusion, infiltrate, no obvious rib fracture, no widened mediastinum.  Interpreted by me                  ECG 12 Lead Documentation Only    Date/Time: 6/5/2025 8:47 PM    Performed by: Alberto Rene DO  Authorized by: Alberto Rene DO    Indications / Diagnosis:  Sob  ECG reviewed by me, the ED Provider: yes    Patient location:  ED  Previous ECG:     Previous ECG:  Compared to current    Comparison ECG info:  12-22    Similarity:  Changes noted  Interpretation:     Interpretation: non-specific    Rate:     ECG rate:  126    ECG rate assessment: normal    Rhythm:     Rhythm: sinus rhythm    Ectopy:     Ectopy: none    QRS:     QRS axis:  Normal    QRS intervals:  Normal  Conduction:     Conduction: normal    ST segments:     ST segments:  Normal  T waves:     T waves: normal    Comments:      This EKG was interpreted by me.      ED Medication and Procedure Management   Prior to Admission Medications   Prescriptions Last Dose Informant Patient  Reported? Taking?   Bevespi Aerosphere 9-4.8 MCG/ACT inhaler   Yes No   Sig: Inhale 2 puffs 2 (two) times a day   DULoxetine (CYMBALTA) 60 mg delayed release capsule   No No   Sig: Take 1 capsule (60 mg total) by mouth 2 (two) times a day Duloxetine 60m capsule po twice daily   Stelara 45 MG/0.5ML SOSY subcutaneous injection   Yes No   albuterol (PROVENTIL HFA,VENTOLIN HFA) 90 mcg/act inhaler   Yes No   Sig: Inhale 2 puffs daily as needed every 4 to 6 hours as needed for wheezing   alendronate (FOSAMAX) 70 mg tablet   Yes No   Sig: Take 70 mg by mouth once a week   amitriptyline (ELAVIL) 50 mg tablet   No No   Sig: Amitriptyline 50m tablet po at night PRN   fluticasone (FLONASE) 50 mcg/act nasal spray   Yes No   Si sprays   loratadine (CLARITIN) 10 mg tablet   Yes No   Sig: Take 10 mg by mouth daily as needed   risperiDONE (RisperDAL) 1 mg tablet   No No   Sig: Risperidone 1 mg : 1 tablet in the morning   risperiDONE (RisperDAL) 4 mg tablet   No No   Sig: Risperidone 4m tablet po night      Facility-Administered Medications: None     Current Discharge Medication List        CONTINUE these medications which have NOT CHANGED    Details   albuterol (PROVENTIL HFA,VENTOLIN HFA) 90 mcg/act inhaler Inhale 2 puffs daily as needed every 4 to 6 hours as needed for wheezing      alendronate (FOSAMAX) 70 mg tablet Take 70 mg by mouth once a week      amitriptyline (ELAVIL) 50 mg tablet Amitriptyline 50m tablet po at night PRN  Qty: 90 tablet, Refills: 1    Associated Diagnoses: Generalized anxiety disorder      Bevespi Aerosphere 9-4.8 MCG/ACT inhaler Inhale 2 puffs 2 (two) times a day      DULoxetine (CYMBALTA) 60 mg delayed release capsule Take 1 capsule (60 mg total) by mouth 2 (two) times a day Duloxetine 60m capsule po twice daily  Qty: 180 capsule, Refills: 1    Comments: Void prior script  Associated Diagnoses: Generalized anxiety disorder      fluticasone (FLONASE) 50 mcg/act nasal spray 2  sprays      loratadine (CLARITIN) 10 mg tablet Take 10 mg by mouth daily as needed      !! risperiDONE (RisperDAL) 1 mg tablet Risperidone 1 mg : 1 tablet in the morning  Qty: 90 tablet, Refills: 1    Associated Diagnoses: Schizophrenia, unspecified type (HCC)      !! risperiDONE (RisperDAL) 4 mg tablet Risperidone 4m tablet po night  Qty: 90 tablet, Refills: 1    Associated Diagnoses: Schizophrenia, unspecified type (HCC)      Stelara 45 MG/0.5ML SOSY subcutaneous injection        !! - Potential duplicate medications found. Please discuss with provider.        No discharge procedures on file.  ED SEPSIS DOCUMENTATION   Time reflects when diagnosis was documented in both MDM as applicable and the Disposition within this note       Time User Action Codes Description Comment    2025 10:30 PM Alberto Rene Add [J44.1] COPD with acute exacerbation (HCC)                      [1]   Past Medical History:  Diagnosis Date    Asthma     Bipolar 1 disorder (HCC)     COPD (chronic obstructive pulmonary disease) (HCC)     Emphysema lung (HCC)     Hypertension    [2]   Past Surgical History:  Procedure Laterality Date    CERVICAL SPINE SURGERY      herniated disc    FOOT SURGERY Left    [3]   Family History  Problem Relation Name Age of Onset    Depression Brother     [4]   Social History  Tobacco Use    Smoking status: Former     Types: Cigars    Smokeless tobacco: Never    Tobacco comments:     25 [address] Haji , smoke 6-7 cigars daily.    Vaping Use    Vaping status: Never Used   Substance Use Topics    Alcohol use: Yes     Comment: rarely    Drug use: Yes     Types: Marijuana        Alberto Rene DO  25 [phone]

## 2025-06-06 NOTE — CASE MANAGEMENT
Case Management Assessment & Discharge Planning Note    Patient name Bill Haji  Location /-01 MRN [id number]  : 1965 Date 2025       Current Admission Date: 2025  Current Admission Diagnosis:Moderate COPD with acute exacerbation (HCC)   Patient Active Problem List    Diagnosis Date Noted    Acute on chronic respiratory failure with hypoxia (HCC) 2025    Psoriasis 10/25/2024    Tobacco use disorder, mild, abuse 2023    Trauma and stressor-related disorder 2023    Schizophrenia (HCC) 2023    Generalized anxiety disorder 2023    SOB (shortness of breath)     Depression 2022    Dependence on nicotine from cigarettes 2022    Osteoporosis 2020    Spinal stenosis of cervical region 2020    Moderate COPD with acute exacerbation (HCC) 2015      LOS (days): 0  Geometric Mean LOS (GMLOS) (days):   Days to GMLOS:     OBJECTIVE:              Current admission status: Observation       Preferred Pharmacy:   [id number] [place] - [address]  [address]  [place] [address]  Phone: [phone] Fax: [phone]    Primary Care Provider: Maria T Turner DO    Primary Insurance: [facility]  Secondary Insurance:     ASSESSMENT:  Active Health Care Proxies    There are no active Health Care Proxies on file.       Advance Directives  Does patient have a Health Care POA?: No  Was patient offered paperwork?: Yes  Does patient currently have a Health Care decision maker?: No  Does patient have Advance Directives?: No  Was patient offered paperwork?: Yes  Primary Contact: Bill, spouse.         Readmission Root Cause  30 Day Readmission: No    Patient Information  Admitted from:: Home  Mental Status: Alert  During Assessment patient was accompanied by: Not accompanied during assessment  Assessment information provided by:: Patient, Brother  Primary Caregiver: Self  Support Systems:  "Self, Family members, Psychiatrist, Therapist  County of Residence: [place]  What [facility] do you live in?: [place].  Home entry access options. Select all that apply.: Stairs  Number of steps to enter home.: 3  Do the steps have railings?: Yes  Type of Current Residence: 2 story home  Upon entering residence, is there a bedroom on the main floor (no further steps)?: No  A bedroom is located on the following floor levels of residence (select all that apply):: 2nd Floor  Upon entering residence, is there a bathroom on the main floor (no further steps)?: Yes  Number of steps to 2nd floor from main floor: One Flight  Living Arrangements: Lives w/ Family members  Is patient a ?: No    Activities of Daily Living Prior to Admission  Functional Status: Independent  Completes ADLs independently?: Yes  Ambulates independently?: Yes  Does patient use assisted devices?: Yes  Assisted Devices (DME) used: Nebulizer, Home Oxygen concentrator, Portable Oxygen tanks  DME Company Name (respiratory supplies): Pt and brother unsure of company name  O2 Rate(s): 3L, per pt. Per DC summary from [facility]: \"Oxygen Equipment Oxygen Conserving Devices (HCP Code: )   At Rest (# of l/min): 3 \"  Does patient currently own DME?: Yes  What DME does the patient currently own?: Home Oxygen concentrator, Portable Oxygen tanks, Nebulizer  Does patient have a history of Outpatient Therapy (PT/OT)?: No  Does the patient have a history of Short-Term Rehab?: Yes  Does patient have a history of HHC?: Yes  Does patient currently have HHC?: Yes    Current Home Health Care  Type of Current Home Care Services: Home OT, Home PT, Nurse visit  Home Health Agency Name:: Other ([facility]  Current Home Health Follow-Up Provider:: PCP    Patient Information Continued  Income Source: SSI/SSD  Does patient have prescription coverage?: Yes  Can the patient afford their medications and any related supplies (such as glucometers or test strips)?: " Yes  Does patient receive dialysis treatments?: No  Does patient have a history of substance abuse?: No  Does patient have a history of Mental Health Diagnosis?: Yes  Is patient receiving treatment for mental health?: Yes  Has patient received inpatient treatment related to mental health in the last 2 years?: No         Means of Transportation  Means of Transport to [facility], [facility]:: Family transport          DISCHARGE DETAILS:    Discharge planning discussed with:: Pt and pt's brotherKang.  Freedom of Choice: Yes     CM contacted family/caregiver?: Yes  Were Treatment Team discharge recommendations reviewed with patient/caregiver?: Yes  Did patient/caregiver verbalize understanding of patient care needs?: Yes  Were patient/caregiver advised of the risks associated with not following Treatment Team discharge recommendations?: Yes    Contacts  Patient Contacts: Kang, brother,.  Relationship to Patient:: Family  Contact Method: Phone  Phone Number: [phone]  Reason/Outcome: Discharge Planning, Emergency Contact, Continuity of Care    Requested Home Health Care         Is the patient interested in HHC at discharge?: Yes  Home Health Discipline requested:: Physical Therapy, Occupational Therapy, Nursing  Home Health Agency Name:: Other ([facility]  HHA External Referral Reason (only applicable if external HHA name selected): Patient has established relationship with provider  Home Health Follow-Up Provider:: PCP  Home Health Services Needed:: Oxygen Via Nasal Cannula, Gait/ADL Training, Strengthening/Theraputic Exercises to Improve Function, COPD Management  Homebound Criteria Met:: Requires the Assistance of Another Person for Safe Ambulation or to Leave the Home, Psychological Issues  Supporting Clincal Findings:: Limited Endurance, Fatigues Easliy in Short Distances, Dyspnea with Exertion, Requires Oxygen    DME Referral Provided  Referral made for DME?: No    Other Referral/Resources/Interventions  "Provided:  [facility]  Referral Comments: [facility] reserved in [place] for [facility]. Pt's PCP is from [place], so called office and requested OPCM. Pt's PCP office does not provide. Called [facility], requested  CM. Awaiting return called. Provided pt informaiton on Waiver Services, HHAs, and OPCM resources.     Per Kang, family is concerned about pt being home for extended periods of time. Kang is leaving for [place] on June 18th and will be out of country for 3 weeks. Per brother Kang, pt 'will tell you everything's fine when his world is collapsing.' Kang was unaware of pt's SOB until pt asked to be taken to hospital. Discovered through [employer] footage that pt had his hand on his knee and trying to breathe at home days prior to initial admission to [facility]. CM offered the above resources to brother Kang, explaining that HHAs would be OOP. Kang expressed understanding of potential plans for dc, and is aware resources/ need for fu.     Treatment Team Recommendation: Home with Home Health Care  Discharge Destination Plan:: Home with Home Health Care  Transport at Discharge : Family            Per [facility]: \"Please fax his face sheet and d/c packet to  at [phone] when he is ready for d/c to home. Yulissa\" CM requested MSW be added.                          Additional Comments: CM met with pt at bedside and spoke with brother Kang over the phone to plan discharge. Pt lives with brother and sister in law in a 2sh, 3ste w/ railing, bedroom is upstairs. Pt reports SOB/being exhausted when walking. Typically walks and completes ADLs independently. Has a DL, but does not drive. Uses a nebulizer and home 02 setup--concentrator and portable tanks. Pt reported 3L is baseline, and that he just got setup with 02 during his previous hospital stay a few days ago. Both he and brother are unsure what company the 02 is from. Hx of OPPT, STR through [facility], and " [facility] [place]. No D&A treatment. IP psych 2003. Follows with [facility] for psychotherapy and psychiatry. Does not have a medical POA. Provided information on how to obtain one. Family will  at [place].

## 2025-06-06 NOTE — UTILIZATION REVIEW
Initial Clinical Review    Admission: Date/Time/Statement:   Admission Orders (From admission, onward)       Ordered        06/05/25 2230  Place in Observation  Once                          Orders Placed This Encounter   Procedures    Place in Observation     Standing Status:   Standing     Number of Occurrences:   1     Level of Care:   Med Surg [16]     ED Arrival Information       Expected   -    Arrival   6/5/2025 19:38    Acuity   Urgent              Means of arrival   Ambulance    Escorted by   [facility] (Main)    Service   Hospitalist    Admission type   Emergency              Arrival complaint   Respiratory Distress             Chief Complaint   Patient presents with    Shortness of Breath     EMS from home; just discharged from [facility] 6/3 for COPD exacerbation, felt worse throughout the day today, on 3L NC at home baseline, EMS gave duoneb enroute       Initial Presentation: 60 y.o. male  to ED via EMS from home.    Admitted to observation with Dx: COPD exacerbation/acute on chronic respiratory .  Presented to ED with shortness of breath starting after recent discharge from another facility.   He went home on oxygen requirements.  On day of arrival,  using oxygen, albuterol, inhalers without effect.  So short of breath, can't ambulate. Sternal pain with inspiration.   Ems gave Duo Neb.      PMHx:COPD on 3 L chronically, hypertension, generalized anxiety disorder, schizophrenia.   On exam: acute distress.  Tachypnea,  retractions.  Wheezing.   Glucose 187.     Wbc 13.10 Imaging shows no acute findings on CxR per ED read.  ED treatment:  Sharp neb, Decadron, IVF bolus.   Hypoxia to 87% on oxygen 3 liters and worsening SOB on ambulation in ER after SHARP neb.   Plan includes to continue Solu Medrol, start azithromycin.  Nebs as needed.   Oximetry and titrate oxygen as needed. .     Anticipated Length of Stay/Certification Statement: Patient will be admitted on an observation basis with an anticipated  length of stay of less than 2 midnights secondary to COPD exacerbation.     6/6/25 observation:  shortness of breath is mainly with exertion.  Has chest tightness and dry cough.   On  exam:  wheezing, decreased air entry.  Wbc 14.21.  Wean oxygen  to maintain saturation greater than 89%. Start Pulmicort twice daily, Xopenex and Atrovent 3 times daily.  Decreased Solu medrol to every 12 hours.  Continue azithromycin.  Will need Pulmonary rehab.  Consult Pulmonary    Per Pulmonary 6/6/25 -  moderate COPD with acute exacerbation/acute chronic respiratory failure with hypoxia/Dependence on nicotine,  has gone from smoking cigarettes to cigars and most recently vaping with none since hospitalization.   Wean Solu medrol.  Will continue Pulmicort nebulizer twice daily as well as Xopenex/Atrovent 3 times daily and azithromycin.  Oxygen to keep sat > 88%    ED Treatment-Medication Administration from 06/05/2025 1938 to 06/05/2025 2300         Date/Time Order Dose Route Action     06/05/2025 1943 ipratropium-albuterol (FOR EMS ONLY) (DUO-NEB) 0.5-2.5 mg/3 mL inhalation solution 3 mL 0 mL Does not apply Given to EMS     06/05/2025 1953 albuterol inhalation solution 10 mg 10 mg Nebulization Given     06/05/2025 1953 ipratropium (ATROVENT) 0.02 % inhalation solution 0.5 mg 0.5 mg Nebulization Given     06/05/2025 1953 sodium chloride 0.9 % inhalation solution 3 mL 3 mL Nebulization Given     06/05/2025 2004 dexamethasone (PF) (DECADRON) injection 10 mg 10 mg Intravenous Given     06/05/2025 2003 sodium chloride 0.9 % bolus 500 mL 500 mL Intravenous New Bag     06/05/2025 1955 acetaminophen (TYLENOL) tablet 975 mg 975 mg Oral Given            Scheduled Medications:  azithromycin, 500 mg, Intravenous, Q24H - dc 0851 6/6  DULoxetine, 60 mg, Oral, BID  enoxaparin, 40 mg, Subcutaneous, Daily  fluticasone, 2 spray, Each Nare, Daily  methylPREDNISolone sodium succinate, 40 mg, Intravenous, Q8H - dc 1140 6/6  risperiDONE, 1 mg, Oral,  QAM  risperiDONE, 4 mg, Oral, HS    azithromycin (ZITHROMAX) tablet 500 mg  Dose: 500 mg  Freq: Every 24 hours Route: PO  Start: 06/06/25 2100 End: 06/08/25 2059  budesonide (PULMICORT) inhalation solution 0.5 mg  Dose: 0.5 mg  Freq: Every 12 hours (RESP) Route: NEBULIZATION  Start: 06/06/25 0800   guaiFENesin (MUCINEX) 12 hr tablet 600 mg  Dose: 600 mg  Freq: 2 times daily Route: PO  Start: 06/06/25 0900  ipratropium (ATROVENT) 0.02 % inhalation solution 0.5 mg  Dose: 0.5 mg  Freq: 3 times daily (RESP) Route: NEBULIZATION  Start: 06/06/25 0800  levalbuterol (XOPENEX) inhalation solution 1.25 mg  Dose: 1.25 mg  Freq: 3 times daily (RESP) Route: NEBULIZATION  Start: 06/06/25 0800    methylPREDNISolone sodium succinate (Solu-MEDROL) injection 40 mg  Dose: 40 mg  Freq: Every 12 hours scheduled Route: IV  Start: 06/06/25 2100     Continuous IV Infusions:     PRN Meds:  acetaminophen, 650 mg, Oral, Q4H PRN x 1 6/5  amitriptyline, 50 mg, Oral, HS PRN x 1 6/5  loratadine, 10 mg, Oral, Daily PRN  ondansetron, 4 mg, Intravenous, Q6H PRN    ED Triage Vitals [06/05/25 1940]   Temperature Pulse Respirations Blood Pressure SpO2 Pain Score   98.6 °F (37 °C) (!) 128 20 148/89 94 % No Pain     Weight (last 2 days)       Date/Time Weight    06/05/25 2342 47.9 (105.6)    06/05/25 2302 47.9 (105.6)    06/05/25 1940 48.5 (107)          Vital Signs (last 3 days)       Date/Time Temp Pulse Resp BP MAP (mmHg) SpO2 Calculated FIO2 (%) - Nasal Cannula Nasal Cannula O2 Flow Rate (L/min) O2 Device Patient Position - Orthostatic VS [place] Coma Scale Score Pain    06/06/25 1351 -- -- -- -- -- 93 % 28 2 L/min Nasal cannula -- -- --    06/06/25 1100 -- -- -- -- -- -- 28 2 L/min Nasal cannula -- 15 No Pain    06/06/25 0912 -- -- -- -- -- 94 % 28 2 L/min Nasal cannula -- -- --    06/06/25 07:55:46 98 °F (36.7 °C) 89 18 140/87 105 94 % -- -- -- -- -- --    06/06/25 04:51:22 98.1 °F (36.7 °C) 101 -- 136/85 102 93 % -- -- -- -- -- --    06/06/25 0423  -- -- -- -- -- -- 28 2 L/min Nasal cannula -- -- --    06/05/25 2345 -- -- -- -- -- -- 32 3 L/min Nasal cannula -- 15 5 06/05/25 2342 98.1 °F (36.7 °C) 108 18 138/84 -- -- -- -- -- -- -- 5 06/05/25 2331 -- -- -- -- -- -- -- -- -- -- -- 5 06/05/25 23:03:41 98.1 °F (36.7 °C) 108 -- 138/84 102 91 % -- -- -- -- -- --    06/05/25 2302 98.1 °F (36.7 °C) 108 18 138/84 -- 91 % -- -- -- -- -- --    06/05/25 2230 -- 108 18 128/75 96 90 % 32 3 L/min Nasal cannula -- -- --    06/05/25 2213 -- -- -- -- -- -- -- -- -- -- -- No Pain    06/05/25 2200 -- 114 20 141/85 105 90 % 32 3 L/min Nasal cannula -- -- --    06/05/25 2144 -- -- -- -- -- 87 % 32 3 L/min Nasal cannula -- -- --    06/05/25 2130 -- 120 24 114/73 -- 90 % 32 3 L/min Nasal cannula -- -- --    06/05/25 2100 -- 121 22 140/82 102 96 % -- -- -- -- -- --    06/05/25 2037 -- -- -- -- -- -- -- -- -- -- -- No Pain    06/05/25 2030 -- 117 22 147/96 117 98 % -- -- -- -- -- --    06/05/25 1955 -- -- -- -- -- -- -- -- -- -- -- Med Not Given for Pain - for MAR use only    06/05/25 1945 -- -- -- -- -- -- -- -- Nasal cannula -- 15 --    06/05/25 1940 98.6 °F (37 °C) 128 20 148/89 111 94 % 36 4 L/min Nasal cannula Sitting -- No Pain          Pertinent Labs/Diagnostic Test Results:   Radiology:  XR chest 1 view portable   ED Interpretation by Alberto Rene DO (06/05 2107)   No acute abnl, no ptx, effusion, infiltrate, no obvious rib fracture, no widened mediastinum.  Interpreted by me              Final Interpretation by Grupo De La Cruz MD (06/06 1427)      No acute cardiopulmonary disease.            Workstation performed: [id number]           Cardiology:  ECG 12 lead    by Interface, Ris Results In (06/05 1945)        ECG 12 Lead Documentation Only   Date/Time: 6/5/2025 8:47 PM  Indications / Diagnosis:  Sob  ECG reviewed by me, the ED Provider: yes    Patient location:  ED  Previous ECG:     Previous ECG:  Compared to current    Comparison ECG info:  12-22    Similarity:   Changes noted  Interpretation:     Interpretation: non-specific    Rate:     ECG rate:  126    ECG rate assessment: normal    Rhythm:     Rhythm: sinus rhythm    Ectopy:     Ectopy: none    QRS:     QRS axis:  Normal    QRS intervals:  Normal  Conduction:     Conduction: normal    ST segments:     ST segments:  Normal  T waves:     T waves: normal     GI:  No orders to display     Results from last 7 days   Lab Units 06/05/25 2000   SARS-COV-2  Negative     Results from last 7 days   Lab Units 06/06/25 0450 06/05/25 2000   WBC Thousand/uL 14.21* 13.10*   HEMOGLOBIN g/dL 13.1 14.6   HEMATOCRIT % 39.9 43.7   PLATELETS Thousands/uL 350 410*   TOTAL NEUT ABS Thousands/µL  --  11.19*         Results from last 7 days   Lab Units 06/06/25 0450 06/05/25 2000   SODIUM mmol/L 136 133*   POTASSIUM mmol/L 4.2 4.3   CHLORIDE mmol/L 102 99   CO2 mmol/L 29 29   ANION GAP mmol/L 5 5   BUN mg/dL 15 19   CREATININE mg/dL 0.55* 0.66   EGFR ml/min/1.73sq m 114 105   CALCIUM mg/dL 8.9 9.4   MAGNESIUM mg/dL 2.3 2.1   PHOSPHORUS mg/dL 3.2  --      Results from last 7 days   Lab Units 06/05/25 2000   AST U/L 18   ALT U/L 32   ALK PHOS U/L 63   TOTAL PROTEIN g/dL 6.7   ALBUMIN g/dL 3.9   TOTAL BILIRUBIN mg/dL 0.35     Results from last 7 days   Lab Units 06/06/25 0450 06/05/25 2000   GLUCOSE RANDOM mg/dL 120 187*     Results from last 7 days   Lab Units 06/05/25 2000   PH FRANCIS  7.389   PCO2 FRANCIS mm Hg 47.2   PO2 FRANCIS mm Hg 108.1*   HCO3 FRANCIS mmol/L 27.9   BASE EXC FRANCIS mmol/L 2.2   O2 CONTENT FRANCIS ml/dL 20.7   O2 HGB, VENOUS % 96.2*     Results from last 7 days   Lab Units 06/05/25 2000   HS TNI 0HR ng/L 3     Results from last 7 days   Lab Units 06/05/25 2000   D-DIMER QUANTITATIVE ug/ml FEU 0.29     Results from last 7 days   Lab Units 06/05/25 2000   PROCALCITONIN ng/ml <0.05     Results from last 7 days   Lab Units 06/05/25 2000   BNP pg/mL 26     Results from last 7 days   Lab Units 06/05/25 2000   INFLUENZA A PCR  Negative    INFLUENZA B PCR  Negative   RSV PCR  Negative     Past Medical History[1]  Present on Admission:   Schizophrenia (HCC)   Generalized anxiety disorder   Moderate COPD with acute exacerbation (HCC)   Acute on chronic respiratory failure with hypoxia (HCC)   Dependence on nicotine from cigarettes      Admitting Diagnosis: SOB (shortness of breath) [R06.02]  COPD with acute exacerbation (HCC) [J44.1]  Age/Sex: 60 y.o. male    Network Utilization Review Department  ATTENTION: Please call with any questions or concerns to [phone] and carefully listen to the prompts so that you are directed to the right person. All voicemails are confidential.   For Discharge needs, contact Care Management DC Support Team at [phone] opt. [phone]  Send all requests for admission clinical reviews, approved or denied determinations and any other requests to dedicated fax number below belonging to the campus where the patient is receiving treatment. List of dedicated fax numbers for the Facilities:  FACILITY NAME UR FAX NUMBER   ADMISSION DENIALS (Administrative/Medical Necessity) [phone]   DISCHARGE SUPPORT TEAM (NETWORK) [phone]   PARENT CHILD HEALTH (Maternity/NICU/Pediatrics) [phone]   [facility] [phone]   [facility] [phone]   [facility] [phone]   [facility] [phone]   [facility] [phone]   [facility] [phone]   [facility] [phone]   [facility] [phone]   [facility] [phone]   [facility] [phone]   [facility] [phone]   [facility] [phone]              [1]   Past Medical History:  Diagnosis Date    Asthma     Bipolar 1 disorder  (HCC)     COPD (chronic obstructive pulmonary disease) (HCC)     Emphysema lung (HCC)     Hypertension

## 2025-06-06 NOTE — H&P
H&P - Hospitalist   Name: Bill Haji 60 y.o. male I MRN: [id number]  Unit/Bed#: ED 11 I Date of Admission: 6/5/2025   Date of Service: 6/5/2025 I Hospital Day: 0     Assessment & Plan  COPD with acute exacerbation (HCC)  Acute on chronic respiratory failure with hypoxia (HCC)  Presents with shortness of breath and dyspnea on exertion with wheezing, retractions.  Recently discharged from [facility] days ago.  Started on 3 L oxygen chronically at that time.  Given DuoNebs in ambulance and levy neb in ED but remains hypoxic with oxygen saturation in 80s when ambulating.  VBG largely WNL.  D-dimer WNL.  Respiratory protocol.  Solu-Medrol and DuoNebs.  Azithromycin for exacerbation.  Schizophrenia (HCC)  Continue risperidone.  Generalized anxiety disorder  Continue amitriptyline and Cymbalta.      VTE Pharmacologic Prophylaxis: VTE Score: 4 Moderate Risk (Score 3-4) - Pharmacological DVT Prophylaxis Ordered: enoxaparin (Lovenox).  Code Status: No Order level 1  Discussion with family: Patient declined call to .     Anticipated Length of Stay: Patient will be admitted on an observation basis with an anticipated length of stay of less than 2 midnights secondary to COPD exacerbation.    History of Present Illness   Chief Complaint: Dyspnea    Bill Haji is a 60 y.o. male with a PMH of COPD on 3 L chronically, hypertension, generalized anxiety disorder, schizophrenia who presents to [facility] with dyspnea.  Patient reports he was just discharged from [facility] 2 days ago for a COPD exacerbation.  He states he did not feel significantly better when he was discharged.  He reports his dyspnea worsened significantly today.  He states it is significantly worse when he attempts to walk even a few feet.  He reports adherence with his home medication regimen.  He denies chest pain, fever, chills, N/V/D.    Review of Systems   Constitutional:  Negative for chills and fever.   HENT:   Negative for ear pain and sore throat.    Eyes:  Negative for pain and visual disturbance.   Respiratory:  Positive for shortness of breath. Negative for cough.    Cardiovascular:  Negative for chest pain and palpitations.   Gastrointestinal:  Negative for abdominal pain and vomiting.   Genitourinary:  Negative for dysuria and hematuria.   Musculoskeletal:  Negative for arthralgias and back pain.   Skin:  Negative for color change and rash.   Neurological:  Negative for seizures and syncope.   All other systems reviewed and are negative.    Historical Information   Past Medical History[1]  Past Surgical History[2]  Social History[3]  E-Cigarette/Vaping    E-Cigarette Use Never User      E-Cigarette/Vaping Substances     Family History[4]  Social History:  Marital Status: Single   Occupation:   Patient Pre-hospital Living Situation: Home, With other family member: brother  Patient Pre-hospital Level of Mobility: walks  Patient Pre-hospital Diet Restrictions: None    Meds/Allergies   I have reviewed home medications with patient personally.  Prior to Admission medications    Medication Sig Start Date End Date Taking? Authorizing Provider   albuterol (PROVENTIL HFA,VENTOLIN HFA) 90 mcg/act inhaler Inhale 2 puffs daily as needed every 4 to 6 hours as needed for wheezing 22   Historical Provider, MD   alendronate (FOSAMAX) 70 mg tablet Take 70 mg by mouth once a week 10/20/22   Historical Provider, MD   amitriptyline (ELAVIL) 50 mg tablet Amitriptyline 50m tablet po at night PRN 25   Raysa Lynch PA-C   Bevespi Aerosphere 9-4.8 MCG/ACT inhaler Inhale 2 puffs 2 (two) times a day 10/7/24   Historical Provider, MD   DULoxetine (CYMBALTA) 60 mg delayed release capsule Take 1 capsule (60 mg total) by mouth 2 (two) times a day Duloxetine 60m capsule po twice daily 25   Raysa Lynch PA-C   fluticasone (FLONASE) 50 mcg/act nasal spray 2 sprays 22   Historical Provider, MD    loratadine (CLARITIN) 10 mg tablet Take 10 mg by mouth daily as needed 22   Historical Provider, MD   risperiDONE (RisperDAL) 1 mg tablet Risperidone 1 mg : 1 tablet in the morning 25   Raysa Lynch PA-C   risperiDONE (RisperDAL) 4 mg tablet Risperidone 4m tablet po night 25   Raysa Lynch PA-C   Stelara 45 MG/0.5ML SOSY subcutaneous injection  8/15/24   Historical Provider, MD     Allergies   Allergen Reactions    Bevespi Aerosphere [Glycopyrrolate-Formoterol] Shortness Of Breath    Bupropion Itching and Rash    Oxcarbazepine Rash and Hives     SJS after 4-5 yrs on the Oxcarbazepine (ascending  rash , swollen throat, dizziness, weakness)    Fluoxetine Itching       Objective :  Temp:  [98.6 °F (37 °C)] 98.6 °F (37 °C)  HR:  [108-128] 108  BP: (114-148)/(73-96) 128/75  Resp:  [18-24] 18  SpO2:  [87 %-98 %] 90 %  O2 Device: Nasal cannula  Nasal Cannula O2 Flow Rate (L/min):  [3 L/min-4 L/min] 3 L/min    Physical Exam  Vitals and nursing note reviewed.   Constitutional:       General: He is not in acute distress.     Appearance: He is well-developed.   HENT:      Head: Normocephalic and atraumatic.     Eyes:      Conjunctiva/sclera: Conjunctivae normal.       Cardiovascular:      Rate and Rhythm: Regular rhythm. Tachycardia present.      Heart sounds: No murmur heard.  Pulmonary:      Effort: Pulmonary effort is normal. No respiratory distress.      Breath sounds: Normal breath sounds.   Abdominal:      Palpations: Abdomen is soft.      Tenderness: There is no abdominal tenderness.     Musculoskeletal:         General: No swelling.      Cervical back: Neck supple.     Skin:     General: Skin is warm and dry.     Neurological:      General: No focal deficit present.      Mental Status: He is alert.     Psychiatric:         Mood and Affect: Mood normal.        Lines/Drains:            Lab Results: I have reviewed the following results:  Results from last 7 days   Lab Units  06/05/25 2000   WBC Thousand/uL 13.10*   HEMOGLOBIN g/dL 14.6   HEMATOCRIT % 43.7   PLATELETS Thousands/uL 410*   SEGS PCT % 86*   LYMPHO PCT % 8*   MONO PCT % 4   EOS PCT % 0     Results from last 7 days   Lab Units 06/05/25 2000   SODIUM mmol/L 133*   POTASSIUM mmol/L 4.3   CHLORIDE mmol/L 99   CO2 mmol/L 29   BUN mg/dL 19   CREATININE mg/dL 0.66   ANION GAP mmol/L 5   CALCIUM mg/dL 9.4   ALBUMIN g/dL 3.9   TOTAL BILIRUBIN mg/dL 0.35   ALK PHOS U/L 63   ALT U/L 32   AST U/L 18   GLUCOSE RANDOM mg/dL 187*             Lab Results   Component Value Date    HGBA1C 6.2 (H) 10/08/2024    HGBA1C 5.8 (H) 10/19/2023    HGBA1C 5.6 04/10/2023     Results from last 7 days   Lab Units 06/05/25 2000   PROCALCITONIN ng/ml <0.05       Imaging Results Review: I personally reviewed the following image studies in PACS and associated radiology reports: chest xray. My interpretation of the radiology images/reports is: Emphysematous changes with no acute cardiopulmonary abnormality.  Other Study Results Review: EKG was personally reviewed and my interpretation is: Sinus Tachycardia. ..    Administrative Statements   I have spent a total time of 76 minutes in caring for this patient on the day of the visit/encounter including Diagnostic results, Patient and family education, Importance of tx compliance, Documenting in the medical record, Reviewing/placing orders in the medical record (including tests, medications, and/or procedures), and Obtaining or reviewing history  .    ** Please Note: This note has been constructed using a voice recognition system. **         [1]   Past Medical History:  Diagnosis Date    Asthma     Bipolar 1 disorder (HCC)     COPD (chronic obstructive pulmonary disease) (HCC)     Emphysema lung (HCC)     Hypertension    [2]   Past Surgical History:  Procedure Laterality Date    CERVICAL SPINE SURGERY      herniated disc    FOOT SURGERY Left    [3]   Social History  Tobacco Use    Smoking status: Former      Types: Cigars    Smokeless tobacco: Never    Tobacco comments:     06/14/24 Bill Haji , smoke 6-7 cigars daily.    Vaping Use    Vaping status: Never Used   Substance and Sexual Activity    Alcohol use: Yes     Comment: rarely    Drug use: Yes     Types: Marijuana   [4]   Family History  Problem Relation Name Age of Onset    Depression Brother

## 2025-06-06 NOTE — ASSESSMENT & PLAN NOTE
Patient admitted last month in a different hospital with COPD exacerbation.  Reported improvement however not with ambulation.  Patient reported shortness of breath with ambulation since, had to increase his oxygen to 3 L from 2 which is his baseline  Physical exam consistent with poor air entry right and wheezing.  Patient reported dry cough  Start Pulmicort twice daily, Xopenex and Atrovent 3 times daily.  Solu-Medrol 40 mg decreased to every 12 hours per pulmonology.  Continue azithromycin  Patient will need pulmonary rehab on discharge  Appreciate pulmonology recommendations

## 2025-06-06 NOTE — PROGRESS NOTES
Patient:    MRN:  [id number]    Raymond Request ID:  [id number]    Level of care reserved:  Home Health Agency    Partner Reserved:  [facility], [place] [address] [phone]    Clinical needs requested:    Geography searched:  [phone]    Start of Service:    Request sent:  8:54am EDT on 6/6/2025 by David Richards    Partner reserved:  1:32pm EDT on 6/6/2025 by David Richards    Choice list shared:

## 2025-06-06 NOTE — PROGRESS NOTES
Progress Note - Hospitalist   Name: Bill Haji 60 y.o. male I MRN: [id number]  Unit/Bed#: -01 I Date of Admission: 6/5/2025   Date of Service: 6/6/2025 I Hospital Day: 0    Assessment & Plan  Acute on chronic respiratory failure with hypoxia (HCC)  Recently discharged from the hospital last month on nasal cannula oxygen.  Patient reported at baseline he was on 2 L however had to increase to 3 L nasal cannula oxygen  Was briefly on 4 L nasal cannula oxygen in the emergency department as his oxygen was 87% on 3 L.  In the setting of COPD exacerbation  Wean off to maintain saturation greater than 89%    Schizophrenia (HCC)  Continue risperidone.  Generalized anxiety disorder  Continue amitriptyline and Cymbalta.  Dependence on nicotine from cigarettes    Moderate COPD with acute exacerbation (HCC)  Patient admitted last month in a different hospital with COPD exacerbation.  Reported improvement however not with ambulation.  Patient reported shortness of breath with ambulation since, had to increase his oxygen to 3 L from 2 which is his baseline  Physical exam consistent with poor air entry right and wheezing.  Patient reported dry cough  Start Pulmicort twice daily, Xopenex and Atrovent 3 times daily.  Solu-Medrol 40 mg decreased to every 12 hours per pulmonology.  Continue azithromycin  Patient will need pulmonary rehab on discharge  Appreciate pulmonology recommendations    VTE Pharmacologic Prophylaxis: VTE Score: 4 Moderate Risk (Score 3-4) - Pharmacological DVT Prophylaxis Ordered: enoxaparin (Lovenox).    Mobility:   Basic Mobility Inpatient Raw Score: 24  JH-HLM Goal: 8: Walk 250 feet or more  JH-HLM Achieved: 7: Walk 25 feet or more  JH-HLM Goal achieved. Continue to encourage appropriate mobility.    Patient Centered Rounds: I performed bedside rounds with nursing staff today.   Discussions with Specialists or Other Care Team Provider: CM, pulmonology    Education and Discussions with Family /  Patient: Updated  (brother) via phone.    Current Length of Stay: 0 day(s)  Current Patient Status: Observation   Certification Statement: Continue observation  Discharge Plan: Anticipate discharge in 24-48 hrs to home.    Code Status: Level 1 - Full Code    Subjective     Shortness of breath mostly with exertion, chest tightness and dry cough    Objective :  Temp:  [98 °F (36.7 °C)-98.6 °F (37 °C)] 98 °F (36.7 °C)  HR:  [] 89  BP: (114-148)/(73-96) 140/87  Resp:  [18-24] 18  SpO2:  [87 %-98 %] 94 %  O2 Device: Nasal cannula  Nasal Cannula O2 Flow Rate (L/min):  [2 L/min-4 L/min] 2 L/min    Body mass index is 18.71 kg/m².     Input and Output Summary (last 24 hours):     Intake/Output Summary (Last 24 hours) at 6/6/2025 1223  Last data filed at 6/6/2025 1213  Gross per 24 hour   Intake 1470 ml   Output 1200 ml   Net 270 ml       Physical Exam  Vitals and nursing note reviewed.   Constitutional:       General: He is not in acute distress.     Appearance: He is not diaphoretic.   HENT:      Head: Normocephalic.     Eyes:      General:         Right eye: No discharge.         Left eye: No discharge.       Cardiovascular:      Rate and Rhythm: Normal rate and regular rhythm.   Pulmonary:      Effort: Pulmonary effort is normal. No respiratory distress.      Breath sounds: Wheezing present. No rhonchi or rales.      Comments: Decreased air entry  Abdominal:      General: There is no distension.      Palpations: Abdomen is soft.      Tenderness: There is no abdominal tenderness. There is no guarding or rebound.     Musculoskeletal:      Cervical back: Normal range of motion.      Right lower leg: No edema.      Left lower leg: No edema.     Skin:     General: Skin is warm.     Neurological:      Mental Status: He is alert and oriented to person, place, and time.     Psychiatric:         Mood and Affect: Mood normal.         Behavior: Behavior normal.           Lines/Drains:              Lab Results: I  have reviewed the following results:   Results from last 7 days   Lab Units 06/06/25  0450 06/05/25 2000   WBC Thousand/uL 14.21* 13.10*   HEMOGLOBIN g/dL 13.1 14.6   HEMATOCRIT % 39.9 43.7   PLATELETS Thousands/uL 350 410*   SEGS PCT %  --  86*   LYMPHO PCT %  --  8*   MONO PCT %  --  4   EOS PCT %  --  0     Results from last 7 days   Lab Units 06/06/25 0450 06/05/25 2000   SODIUM mmol/L 136 133*   POTASSIUM mmol/L 4.2 4.3   CHLORIDE mmol/L 102 99   CO2 mmol/L 29 29   BUN mg/dL 15 19   CREATININE mg/dL 0.55* 0.66   ANION GAP mmol/L 5 5   CALCIUM mg/dL 8.9 9.4   ALBUMIN g/dL  --  3.9   TOTAL BILIRUBIN mg/dL  --  0.35   ALK PHOS U/L  --  63   ALT U/L  --  32   AST U/L  --  18   GLUCOSE RANDOM mg/dL 120 187*                 Results from last 7 days   Lab Units 06/05/25 2000   PROCALCITONIN ng/ml <0.05       Recent Cultures (last 7 days):         Imaging Results Review: No pertinent imaging studies reviewed.  Other Study Results Review: No additional pertinent studies reviewed.    Last 24 Hours Medication List:     Current Facility-Administered Medications:     acetaminophen (TYLENOL) tablet 650 mg, Q4H PRN    amitriptyline (ELAVIL) tablet 50 mg, HS PRN    azithromycin (ZITHROMAX) tablet 500 mg, Q24H    budesonide (PULMICORT) inhalation solution 0.5 mg, Q12H    DULoxetine (CYMBALTA) delayed release capsule 60 mg, BID    enoxaparin (LOVENOX) subcutaneous injection 40 mg, Daily    fluticasone (FLONASE) 50 mcg/act nasal spray 2 spray, Daily    guaiFENesin (MUCINEX) 12 hr tablet 600 mg, BID    ipratropium (ATROVENT) 0.02 % inhalation solution 0.5 mg, TID    levalbuterol (XOPENEX) inhalation solution 1.25 mg, TID **AND** [DISCONTINUED] sodium chloride 0.9 % inhalation solution 3 mL, TID    loratadine (CLARITIN) tablet 10 mg, Daily PRN    methylPREDNISolone sodium succinate (Solu-MEDROL) injection 40 mg, Q12H ALONDRA    ondansetron (ZOFRAN) injection 4 mg, Q6H PRN    risperiDONE (RisperDAL) tablet 1 mg, QAM    risperiDONE  (RisperDAL) tablet 4 mg, HS    Administrative Statements   Today, Patient Was Seen By: Libby Preston MD      **Please Note: This note may have been constructed using a voice recognition system.**

## 2025-06-06 NOTE — CONSULTS
Consultation - Pulmonology   Name: Bill Haji 60 y.o. male I MRN: [id number]  Unit/Bed#: -01 I Date of Admission: 6/5/2025   Date of Service: 6/6/2025 I Hospital Day: 0   Inpatient consult to Pulmonology  Consult performed by: Nayeli Wells DO  Consult ordered by: Mason Jimenez PA-C        Physician Requesting Evaluation: Libby Preston MD   Reason for Evaluation / Principal Problem: COPD exacerbation    Assessment & Plan  Moderate COPD with acute exacerbation (HCC)  Continue Solu-Medrol, but okay to wean to every 12 hours dosing  Will continue Pulmicort nebulizer twice daily as well as Xopenex/Atrovent 3 times daily.  He has not had any reactions here to albuterol or Xopenex (per recent admission, the allergic reaction was tachycardia to albuterol).  Will monitor clinically.  Continue Zithromax in the setting of COPD exacerbation    Likely at time of discharge we will discharge home on Spiriva daily with Xopenex as needed  Would benefit from pulmonary rehabilitation  Acute on chronic respiratory failure with hypoxia (HCC)  Recently started on 3L O2  Needed 4L briefly in the ED    Plan:  Supplemental oxygen to maintain sats >88%    Dependence on nicotine from cigarettes  Has gone from smoking cigarettes to cigars and most recently vaping  His brother reports none since hospitalization    Needs to remain complete smoking cessation      History of Present Illness   Bill Haji is a 60 y.o. male who presents with shortness of breath and chest tightness.  He states that the symptoms initially started 6 weeks ago.  He had 1 episode of chest tightness and shortness of breath, that resolved spontaneously.  Then approximately a week later he developed chest tightness and shortness of breath that has not improved.  He denied any associated respiratory illness.  He denied any associated edema.    He has COPD and is followed by [facility]. Per their last record: Takes Bevespi 2/2 thrush with both Trelegy AND  Breztri   Severity : GOLD Stage 2B     He was admitted at [facility] earlier in the week with a COPD exacerbation. He was discharged on 3LO2 which was a new oxygen start for him.  He states during that time he started to have a reaction with albuterol.  He developed fast heart rate and throat closing.  No new rashes.  Since discharge she has not been on Bevespi or albuterol.  He has been taking Atrovent nebulizers only.  In the emergency room, prior to knowing this reaction to albuterol, he had a 10 mg albuterol nebulizer without any side effects.  He had Xopenex nebulizer this morning without side effects.    He felt good while he was hospitalized, but states that since being home he has had significant exertional dyspnea.  He was unable to take a shower or even eat because he was so short of breath at home.    He currently denies any fevers or chills.  He has a cough but is not bringing up any mucus.  He has chest tightness and wheezing.  No lower extremity edema.  He did think earlier in the week he had some edema around his left ankle where he had surgery.  No nausea or vomiting.    Review of Systems   Constitutional:  Positive for activity change. Negative for appetite change and fever.   HENT:  Negative for congestion.    Respiratory:  Positive for chest tightness, shortness of breath and wheezing.    Cardiovascular:  Negative for leg swelling.   Gastrointestinal:  Negative for nausea and vomiting.   Musculoskeletal:  Negative for arthralgias and gait problem.   Skin:  Negative for rash.   All other systems reviewed and are negative.      Historical Information   Historical Information   Past Medical History[1]  Past Surgical History[2]  Social History[3]  E-Cigarette/Vaping    E-Cigarette Use Never User      E-Cigarette/Vaping Substances     Family History[4]      Objective :  Temp:  [98 °F (36.7 °C)-98.6 °F (37 °C)] 98 °F (36.7 °C)  HR:  [] 89  BP: (114-148)/(73-96) 140/87  Resp:  [18-24] 18  SpO2:  [87  %-98 %] 94 %  O2 Device: Nasal cannula  Nasal Cannula O2 Flow Rate (L/min):  [2 L/min-4 L/min] 2 L/min    Physical Exam    General:  Patient is awake, alert, non-toxic and in no acute respiratory distress  Eyes:no scleral icterus  Neck: No JVD  CV:  Regular, +S1 and S2, No murmurs, gallops or rubs appreciated  Lungs: Diminished breath sounds throughout with inspiratory wheeze in all lung fields i  Abdomen: Soft, +BS, Non-tender, non-distended  Extremities: No clubbing, cyanosis or edema  Neuro: No focal motor/sensory deficits  Skin: Warm, No rashes or ulcerations      Lab Results: I have reviewed the following results:  .     06/05/25 2000 06/06/25  0450   WBC 13.10* 14.21*   HGB 14.6 13.1   HCT 43.7 39.9   * 350   SODIUM 133* 136   K 4.3 4.2   CL 99 102   CO2 29 29   BUN 19 15   CREATININE 0.66 0.55*   GLUC 187* 120   MG 2.1 2.3   PHOS  --  3.2   AST 18  --    ALT 32  --    ALB 3.9  --    TBILI 0.35  --    ALKPHOS 63  --    HSTNI0 3  --    BNP 26  --      ABG: No new results in last 24 hours.    Imaging Results Review: I personally reviewed the following image studies in PACS and associated radiology reports: chest xray. My interpretation of the radiology images/reports is: Bilateral emphysema, no focal infiltrates.  Formal radiology interpretation pending.  Other Study Results Review: No additional pertinent studies reviewed.  PFT Results Reviewed: reviewed and interpreted  4/17/23  Results:  Moderate COPD with air trapping and diffusion impairment     Spirometry:  FEV1/FVC Ratio is 49%. FEV1 is 1.81 L/53% of predicted. FVC is 3.71 L/86% of predicted.  No significant bronchodilator response  Lung volumes:  RV 3.71 L/183% of predicted, TLC 7.64 L/119% of predicted, RV/TLC ratio 49%  Diffusing capacity:  50 % of predicted      VTE Prophylaxis: Enoxaparin (Lovenox)           [1]   Past Medical History:  Diagnosis Date    Asthma     Bipolar 1 disorder (HCC)     COPD (chronic obstructive pulmonary disease)  (HCC)     Emphysema lung (HCC)     Hypertension    [2]   Past Surgical History:  Procedure Laterality Date    CERVICAL SPINE SURGERY      herniated disc    FOOT SURGERY Left    [3]   Social History  Tobacco Use    Smoking status: Former     Types: Cigars    Smokeless tobacco: Never    Tobacco comments:     06/14/24 Bill Haji , smoke 6-7 cigars daily.    Vaping Use    Vaping status: Never Used   Substance and Sexual Activity    Alcohol use: Yes     Comment: rarely    Drug use: Yes     Types: Marijuana   [4]   Family History  Problem Relation Name Age of Onset    Depression Brother

## 2025-06-07 LAB
ANION GAP SERPL CALCULATED.3IONS-SCNC: 7 MMOL/L (ref 4–13)
BUN SERPL-MCNC: 13 MG/DL (ref 5–25)
CALCIUM SERPL-MCNC: 8.9 MG/DL (ref 8.4–10.2)
CHLORIDE SERPL-SCNC: 103 MMOL/L (ref 96–108)
CO2 SERPL-SCNC: 26 MMOL/L (ref 21–32)
CREAT SERPL-MCNC: 0.56 MG/DL (ref 0.6–1.3)
ERYTHROCYTE [DISTWIDTH] IN BLOOD BY AUTOMATED COUNT: 12.4 % (ref 11.6–15.1)
GFR SERPL CREATININE-BSD FRML MDRD: 113 ML/MIN/1.73SQ M
GLUCOSE P FAST SERPL-MCNC: 133 MG/DL (ref 65–99)
GLUCOSE SERPL-MCNC: 133 MG/DL (ref 65–140)
HCT VFR BLD AUTO: 39.8 % (ref 36.5–49.3)
HGB BLD-MCNC: 12.9 G/DL (ref 12–17)
MCH RBC QN AUTO: 30.9 PG (ref 26.8–34.3)
MCHC RBC AUTO-ENTMCNC: 32.4 G/DL (ref 31.4–37.4)
MCV RBC AUTO: 95 FL (ref 82–98)
PLATELET # BLD AUTO: 337 THOUSANDS/UL (ref 149–390)
PMV BLD AUTO: 8.8 FL (ref 8.9–12.7)
POTASSIUM SERPL-SCNC: 4.3 MMOL/L (ref 3.5–5.3)
RBC # BLD AUTO: 4.17 MILLION/UL (ref 3.88–5.62)
SODIUM SERPL-SCNC: 136 MMOL/L (ref 135–147)
WBC # BLD AUTO: 13.38 THOUSAND/UL (ref 4.31–10.16)

## 2025-06-07 PROCEDURE — 80048 BASIC METABOLIC PNL TOTAL CA: CPT | Performed by: INTERNAL MEDICINE

## 2025-06-07 PROCEDURE — 99232 SBSQ HOSP IP/OBS MODERATE 35: CPT | Performed by: PHYSICIAN ASSISTANT

## 2025-06-07 PROCEDURE — 99232 SBSQ HOSP IP/OBS MODERATE 35: CPT | Performed by: INTERNAL MEDICINE

## 2025-06-07 PROCEDURE — 94760 N-INVAS EAR/PLS OXIMETRY 1: CPT

## 2025-06-07 PROCEDURE — 94640 AIRWAY INHALATION TREATMENT: CPT

## 2025-06-07 PROCEDURE — 85027 COMPLETE CBC AUTOMATED: CPT | Performed by: INTERNAL MEDICINE

## 2025-06-07 RX ORDER — UMECLIDINIUM BROMIDE AND VILANTEROL TRIFENATATE 62.5; 25 UG/1; UG/1
1 POWDER RESPIRATORY (INHALATION) DAILY
Status: DISCONTINUED | OUTPATIENT
Start: 2025-06-07 | End: 2025-06-10 | Stop reason: HOSPADM

## 2025-06-07 RX ADMIN — GUAIFENESIN 600 MG: 600 TABLET ORAL at 09:53

## 2025-06-07 RX ADMIN — METHYLPREDNISOLONE SODIUM SUCCINATE 40 MG: 40 INJECTION, POWDER, FOR SOLUTION INTRAMUSCULAR; INTRAVENOUS at 09:52

## 2025-06-07 RX ADMIN — LEVALBUTEROL HYDROCHLORIDE 1.25 MG: 1.25 SOLUTION RESPIRATORY (INHALATION) at 13:54

## 2025-06-07 RX ADMIN — RISPERIDONE 4 MG: 2 TABLET, FILM COATED ORAL at 21:31

## 2025-06-07 RX ADMIN — AMITRIPTYLINE HYDROCHLORIDE 50 MG: 50 TABLET, FILM COATED ORAL at 21:32

## 2025-06-07 RX ADMIN — GUAIFENESIN 600 MG: 600 TABLET ORAL at 17:14

## 2025-06-07 RX ADMIN — LEVALBUTEROL HYDROCHLORIDE 1.25 MG: 1.25 SOLUTION RESPIRATORY (INHALATION) at 20:00

## 2025-06-07 RX ADMIN — AZITHROMYCIN DIHYDRATE 500 MG: 500 TABLET ORAL at 21:32

## 2025-06-07 RX ADMIN — DULOXETINE HYDROCHLORIDE 60 MG: 30 CAPSULE, DELAYED RELEASE ORAL at 09:53

## 2025-06-07 RX ADMIN — BUDESONIDE 0.5 MG: 0.5 INHALANT RESPIRATORY (INHALATION) at 08:11

## 2025-06-07 RX ADMIN — ENOXAPARIN SODIUM 40 MG: 40 INJECTION SUBCUTANEOUS at 09:52

## 2025-06-07 RX ADMIN — FLUTICASONE PROPIONATE 2 SPRAY: 50 SPRAY, METERED NASAL at 09:54

## 2025-06-07 RX ADMIN — RISPERIDONE 1 MG: 0.5 TABLET, FILM COATED ORAL at 09:53

## 2025-06-07 RX ADMIN — UMECLIDINIUM BROMIDE AND VILANTEROL TRIFENATATE 1 PUFF: 62.5; 25 POWDER RESPIRATORY (INHALATION) at 13:14

## 2025-06-07 RX ADMIN — DULOXETINE HYDROCHLORIDE 60 MG: 30 CAPSULE, DELAYED RELEASE ORAL at 17:15

## 2025-06-07 RX ADMIN — IPRATROPIUM BROMIDE 0.5 MG: 0.5 SOLUTION RESPIRATORY (INHALATION) at 08:11

## 2025-06-07 RX ADMIN — METHYLPREDNISOLONE SODIUM SUCCINATE 40 MG: 40 INJECTION, POWDER, FOR SOLUTION INTRAMUSCULAR; INTRAVENOUS at 21:32

## 2025-06-07 RX ADMIN — LEVALBUTEROL HYDROCHLORIDE 1.25 MG: 1.25 SOLUTION RESPIRATORY (INHALATION) at 08:11

## 2025-06-07 NOTE — PROGRESS NOTES
Progress Note - Hospitalist   Name: Bill Haji 60 y.o. male I MRN: [id number]  Unit/Bed#: -01 I Date of Admission: 6/5/2025   Date of Service: 6/7/2025 I Hospital Day: 0    Assessment & Plan  Moderate COPD with acute exacerbation (HCC)  Patient admitted last month in a different hospital with COPD exacerbation.  Reported improvement however not with ambulation.  Patient reported shortness of breath with ambulation since, had to increase his oxygen to 3 L from 2 which is his baseline  Continue azithromycin x 3 days  Patient will need pulmonary rehab on discharge  Appreciate pulmonology recommendations  Started Anoro 1 puff daily while inpatient -resume Bevespi on discharge  Continue Xopenex 3 times daily  Continue IV Solu-Medrol every 12 hours today and possible transition to prednisone tomorrow  Encourage OOB - discussed with nursing  Acute on chronic respiratory failure with hypoxia (HCC)  Recently discharged from the hospital last month on nasal cannula oxygen.  Patient reported at baseline he was on 2 L however had to increase to 3 L nasal cannula oxygen  Was briefly on 4 L nasal cannula oxygen in the emergency department as his oxygen was 87% on 3 L.  In the setting of COPD exacerbation  Wean off to maintain saturation greater than 89%  Stable on 2 L nasal cannula    Schizophrenia (HCC)  Continue risperidone.  Generalized anxiety disorder  Continue amitriptyline and Cymbalta.    VTE Pharmacologic Prophylaxis: VTE Score: 4 Moderate Risk (Score 3-4) - Pharmacological DVT Prophylaxis Ordered: enoxaparin (Lovenox).    Mobility:   Basic Mobility Inpatient Raw Score: 24  JH-HLM Goal: 8: Walk 250 feet or more  JH-HLM Achieved: 7: Walk 25 feet or more  JH-HLM Goal NOT achieved. Continue with multidisciplinary rounding and encourage appropriate mobility to improve upon JH-HLM goals.    Patient Centered Rounds: I performed bedside rounds with nursing staff today.   Discussions with Specialists or Other Care Team  Provider: Pulmonology    Education and Discussions with Family / Patient: Attempted to update  (brother) via phone. Unable to contact.  Kept getting a google assistant prompt and despite multiple attempts stating intention of phone call, call was blocked and unable to leave .    Current Length of Stay: 0 day(s)  Current Patient Status: Inpatient   Certification Statement: The patient will continue to require additional inpatient hospital stay due to IV steroids  Discharge Plan: Anticipate discharge in 24-48 hrs to home.    Code Status: Level 1 - Full Code    Subjective   Patient overall feeling improved since admission.  Does note dyspnea on exertion.  Reports reproducible chest pain with coughing.  Denies nausea/vomiting, abdominal pain, fever/chills.  Reports good appetite.    Objective :  Temp:  [97.7 °F (36.5 °C)-98.2 °F (36.8 °C)] 97.7 °F (36.5 °C)  HR:  [] 80  BP: ()/(76-79) 114/77  Resp:  [18-20] 20  SpO2:  [88 %-95 %] 92 %  O2 Device: Nasal cannula  Nasal Cannula O2 Flow Rate (L/min):  [2 L/min] 2 L/min    Body mass index is 18.71 kg/m².     Input and Output Summary (last 24 hours):     Intake/Output Summary (Last 24 hours) at 6/7/2025 1301  Last data filed at 6/7/2025 1145  Gross per 24 hour   Intake 2220 ml   Output 2725 ml   Net -505 ml       Physical Exam  Vitals and nursing note reviewed.   Constitutional:       Appearance: Normal appearance.      Interventions: Nasal cannula in place.      Comments: No acute distress   HENT:      Head: Normocephalic.     Eyes:      General: No scleral icterus.     Extraocular Movements: Extraocular movements intact.      Conjunctiva/sclera: Conjunctivae normal.       Cardiovascular:      Rate and Rhythm: Normal rate and regular rhythm.      Heart sounds: Normal heart sounds. No murmur heard.  Pulmonary:      Effort: Pulmonary effort is normal. No respiratory distress.      Breath sounds: Decreased breath sounds present.   Abdominal:       General: Bowel sounds are normal.      Palpations: Abdomen is soft.      Tenderness: There is no abdominal tenderness. There is no guarding or rebound.     Musculoskeletal:      Cervical back: Normal range of motion.      Comments: Able to move upper/lower extremities bilaterally, no edema     Skin:     General: Skin is warm and dry.     Neurological:      Mental Status: He is alert. Mental status is at baseline.     Psychiatric:         Mood and Affect: Mood normal.         Speech: Speech normal.         Behavior: Behavior normal.           Lines/Drains:              Lab Results: I have reviewed the following results:   Results from last 7 days   Lab Units 06/07/25 0331 06/06/25 0450 06/05/25 2000   WBC Thousand/uL 13.38*   < > 13.10*   HEMOGLOBIN g/dL 12.9   < > 14.6   HEMATOCRIT % 39.8   < > 43.7   PLATELETS Thousands/uL 337   < > 410*   SEGS PCT %  --   --  86*   LYMPHO PCT %  --   --  8*   MONO PCT %  --   --  4   EOS PCT %  --   --  0    < > = values in this interval not displayed.     Results from last 7 days   Lab Units 06/07/25 0331 06/06/25 0450 06/05/25 2000   SODIUM mmol/L 136   < > 133*   POTASSIUM mmol/L 4.3   < > 4.3   CHLORIDE mmol/L 103   < > 99   CO2 mmol/L 26   < > 29   BUN mg/dL 13   < > 19   CREATININE mg/dL 0.56*   < > 0.66   ANION GAP mmol/L 7   < > 5   CALCIUM mg/dL 8.9   < > 9.4   ALBUMIN g/dL  --   --  3.9   TOTAL BILIRUBIN mg/dL  --   --  0.35   ALK PHOS U/L  --   --  63   ALT U/L  --   --  32   AST U/L  --   --  18   GLUCOSE RANDOM mg/dL 133   < > 187*    < > = values in this interval not displayed.                 Results from last 7 days   Lab Units 06/05/25 2000   PROCALCITONIN ng/ml <0.05       Recent Cultures (last 7 days):         Imaging Results Review: I reviewed radiology reports from this admission including: chest xray.  Other Study Results Review: No additional pertinent studies reviewed.    Last 24 Hours Medication List:     Current Facility-Administered Medications:      acetaminophen (TYLENOL) tablet 650 mg, Q4H PRN    amitriptyline (ELAVIL) tablet 50 mg, HS PRN    azithromycin (ZITHROMAX) tablet 500 mg, Q24H    DULoxetine (CYMBALTA) delayed release capsule 60 mg, BID    enoxaparin (LOVENOX) subcutaneous injection 40 mg, Daily    fluticasone (FLONASE) 50 mcg/act nasal spray 2 spray, Daily    guaiFENesin (MUCINEX) 12 hr tablet 600 mg, BID    levalbuterol (XOPENEX) inhalation solution 1.25 mg, TID **AND** [DISCONTINUED] sodium chloride 0.9 % inhalation solution 3 mL, TID    loratadine (CLARITIN) tablet 10 mg, Daily PRN    methylPREDNISolone sodium succinate (Solu-MEDROL) injection 40 mg, Q12H ALONDRA    ondansetron (ZOFRAN) injection 4 mg, Q6H PRN    risperiDONE (RisperDAL) tablet 1 mg, QAM    risperiDONE (RisperDAL) tablet 4 mg, HS    umeclidinium-vilanterol 62.5-25 mcg/actuation inhaler 1 puff, Daily    Administrative Statements   Today, Patient Was Seen By: Miracle Warner PA-C  I have spent a total time of 35 minutes in caring for this patient on the day of the visit/encounter including Diagnostic results, Instructions for management, Patient and family education, Importance of tx compliance, Risk factor reductions, Impressions, Counseling / Coordination of care, Documenting in the medical record, Reviewing/placing orders in the medical record (including tests, medications, and/or procedures), Obtaining or reviewing history  , and Communicating with other healthcare professionals .    **Please Note: This note may have been constructed using a voice recognition system.**

## 2025-06-07 NOTE — PROGRESS NOTES
Progress Note - Pulmonology   Name: Bill Haji 60 y.o. male I MRN: [id number]  Unit/Bed#: -01 I Date of Admission: 6/5/2025   Date of Service: 6/7/2025 I Hospital Day: 0     Assessment & Plan  Moderate COPD with acute exacerbation (HCC)  Continue Solu-Medrol every 12 hours today, hopeful to transition to prednisone in a.m.    Appears to not have a true allergy to albuterol, other than tachycardia  Will start Anoro 1 puff daily, continue Xopenex 3 times daily  Continue Zithromax in the setting of COPD exacerbation -plan for 3 days total    At discharge can resume home Bevespi 2 puffs twice daily  Will switch albuterol to Xopenex in the setting of tachycardia  Will need prolonged prednisone taper  Would benefit from outpatient pulmonary rehabilitation  Acute on chronic respiratory failure with hypoxia (HCC)  Recently started on 3L O2  Needed 4L briefly in the ED    Plan:  Supplemental oxygen to maintain sats >88%    Dependence on nicotine from cigarettes  Has gone from smoking cigarettes to cigars and most recently vaping  His brother reports none since hospitalization    Needs to remain complete smoking cessation    24 Hour Events : Slightly better  Subjective : Overall he feels like he is doing slightly better.  He states that he is only been out of the bed minimally.  He continues to have some chest tightness and wheezing.    Objective :  Temp:  [97.7 °F (36.5 °C)-98.2 °F (36.8 °C)] 97.7 °F (36.5 °C)  HR:  [] 80  BP: ()/(76-79) 114/77  Resp:  [18-20] 20  SpO2:  [88 %-95 %] 92 %  O2 Device: Nasal cannula  Nasal Cannula O2 Flow Rate (L/min):  [2 L/min] 2 L/min    Physical Exam    General:  Patient is awake, alert, non-toxic and in no acute respiratory distress  Eyes:  no scleral icterus  CV:  Regular, +S1 and S2, No murmurs, gallops or rubs appreciated  Lungs: Greatly diminished breath sounds bilateral worse on the right with expiratory wheeze  Abdomen: Soft, +BS, Non-tender,  non-distended  Extremities: No clubbing, cyanosis or edema  Neuro: No focal motor/sensory deficits  Skin: Warm, No rashes       Lab Results: I have reviewed the following results:   .     06/07/25  0331   WBC 13.38*   HGB 12.9   HCT 39.8      SODIUM 136   K 4.3      CO2 26   BUN 13   CREATININE 0.56*   GLUC 133     ABG: No new results in last 24 hours.

## 2025-06-07 NOTE — ASSESSMENT & PLAN NOTE
Patient admitted last month in a different hospital with COPD exacerbation.  Reported improvement however not with ambulation.  Patient reported shortness of breath with ambulation since, had to increase his oxygen to 3 L from 2 which is his baseline  Continue azithromycin x 3 days  Patient will need pulmonary rehab on discharge  Appreciate pulmonology recommendations  Started Anoro 1 puff daily while inpatient -resume Bevespi on discharge  Continue Xopenex 3 times daily  Continue IV Solu-Medrol every 12 hours today and possible transition to prednisone tomorrow  Encourage OOB - discussed with nursing

## 2025-06-07 NOTE — UTILIZATION REVIEW
Continued Stay Review  OBSERVATION 6/5/25@2230 CONVERTED TO INPATIENT 6/7/25@1301 DUE TO copd EXACERBATION REQUIRING ABX, STEROIDS, OXYGEN        Start   Ordered   06/07/25 1302  INPATIENT ADMISSION  Once        Transfer Service: Hospitalist   Question Answer Comment   Level of Care Med Surg    Estimated length of stay More than 2 Midnights    Certification I certify that inpatient services are medically necessary for this patient for a duration of greater than two midnights. See H&P and MD Progress Notes for additional information about the patient's course of treatment.                   Date: 6/7                          Current Patient Class: Inpatient  Current Level of Care:   Med/surg  HPI:60 y.o. male initially admitted on 6/7     Assessment/Plan:   Continue azythromycin, iv steroids.  Was briefly on 4LNC.    Pulmonary consult:  Continue solumedrol.  LUngs diminished, expiratory wheezing.     6/8 UPDate: continue with iv steroids.  Expiratory wheezing present.  OOB.  Stable on 2 LNC. Decreased breath sounds.     Medications:   Scheduled Medications:  azithromycin, 500 mg, Oral, Q24H  DULoxetine, 60 mg, Oral, BID  enoxaparin, 40 mg, Subcutaneous, Daily  fluticasone, 2 spray, Each Nare, Daily  guaiFENesin, 600 mg, Oral, BID  levalbuterol, 1.25 mg, Nebulization, TID  methylPREDNISolone sodium succinate, 40 mg, Intravenous, Q12H ALONDRA  risperiDONE, 1 mg, Oral, QAM  risperiDONE, 4 mg, Oral, HS  umeclidinium-vilanterol, 1 puff, Inhalation, Daily      Continuous IV Infusions:     PRN Meds:  acetaminophen, 650 mg, Oral, Q4H PRN  amitriptyline, 50 mg, Oral, HS PRN  loratadine, 10 mg, Oral, Daily PRN  ondansetron, 4 mg, Intravenous, Q6H PRN  polyethylene glycol, 17 g, Oral, Daily PRN      Discharge Plan: TBd    Vital Signs (last 3 days)       Date/Time Temp Pulse Resp BP MAP (mmHg) SpO2 Calculated FIO2 (%) - Nasal Cannula Nasal Cannula O2 Flow Rate (L/min) O2 Device Patient Position - Orthostatic VS Kylie Coma Scale  Score Pain    06/08/25 08:03:26 97.9 °F (36.6 °C) 107 -- 113/78 90 88 % -- -- -- -- -- --    06/08/25 0800 97.9 °F (36.6 °C) -- -- -- -- -- 28 2 L/min Nasal cannula -- 15 No Pain    06/08/25 0728 -- -- -- -- -- -- 28 2 L/min Nasal cannula -- -- --    06/08/25 05:29:21 97.9 °F (36.6 °C) 92 18 114/81 92 91 % 28 2 L/min Nasal cannula Lying -- --    06/08/25 0500 -- 83 -- -- -- 94 % -- -- -- -- -- --    06/08/25 0300 -- 88 -- -- -- 95 % -- -- -- -- -- --    06/08/25 0100 -- 100 -- -- -- 90 % -- -- -- -- -- --    06/07/25 2300 -- 112 -- -- -- 92 % -- -- -- -- -- --    06/07/25 21:23:48 98.4 °F (36.9 °C) 114 20 129/81 97 91 % 28 2 L/min Nasal cannula Lying 15 No Pain    06/07/25 2100 -- 113 -- -- -- 91 % -- -- -- -- -- --    06/07/25 2000 -- 123 20 -- -- 91 % 28 2 L/min Nasal cannula -- -- --    06/07/25 1900 -- 118 -- -- -- 92 % -- -- -- -- -- --    06/07/25 1700 -- 107 -- -- -- 92 % -- -- -- -- -- --    06/07/25 15:00:29 99 °F (37.2 °C) 98 -- 115/76 89 92 % -- -- -- -- -- --    06/07/25 1355 -- -- -- -- -- -- 28 2 L/min Nasal cannula -- -- --    06/07/25 0812 -- -- -- -- -- -- 28 2 L/min Nasal cannula -- -- --    06/07/25 0800 97.7 °F (36.5 °C) -- -- -- -- -- 28 2 L/min Nasal cannula -- 15 No Pain    06/07/25 07:37:53 97.7 °F (36.5 °C) 80 20 114/77 89 92 % -- -- -- -- -- --    06/07/25 0500 -- 78 -- -- -- 95 % -- -- -- -- -- --    06/07/25 03:30:42 98.1 °F (36.7 °C) 104 18 111/79 90 88 % 28 2 L/min Nasal cannula Lying -- --    06/07/25 0300 -- 90 -- -- -- 93 % -- -- -- -- -- --    06/07/25 0100 -- 94 -- -- -- 94 % -- -- -- -- -- --    06/06/25 2300 -- 108 -- -- -- 92 % -- -- -- -- -- --    06/06/25 21:03:55 98.2 °F (36.8 °C) 109 18 111/77 88 93 % 28 2 L/min Nasal cannula Lying 15 No Pain    06/06/25 2100 -- 104 -- -- -- 92 % -- -- -- -- -- --    06/06/25 1935 -- 115 -- -- -- 94 % 28 2 L/min Nasal cannula -- -- --    06/06/25 1900 -- 106 -- -- -- 94 % -- -- -- -- -- --    06/06/25 1700 -- 100 -- -- -- 95 % -- -- -- -- --  --    06/06/25 15:58:53 -- 115 -- 104/77 86 91 % -- -- -- Lying -- --    06/06/25 15:43:07 98 °F (36.7 °C) 111 18 98/76 83 93 % -- -- -- Lying -- --    06/06/25 1351 -- -- -- -- -- 93 % 28 2 L/min Nasal cannula -- -- --    06/06/25 1100 -- -- -- -- -- -- 28 2 L/min Nasal cannula -- 15 No Pain    06/06/25 0912 -- -- -- -- -- 94 % 28 2 L/min Nasal cannula -- -- --    06/06/25 07:55:46 98 °F (36.7 °C) 89 18 140/87 105 94 % -- -- -- -- -- --    06/06/25 04:51:22 98.1 °F (36.7 °C) 101 -- 136/85 102 93 % -- -- -- -- -- --    06/06/25 0423 -- -- -- -- -- -- 28 2 L/min Nasal cannula -- -- --    06/05/25 2345 -- -- -- -- -- -- 32 3 L/min Nasal cannula -- 15 5    06/05/25 2342 98.1 °F (36.7 °C) 108 18 138/84 -- -- -- -- -- -- -- 5    06/05/25 2331 -- -- -- -- -- -- -- -- -- -- -- 5 06/05/25 23:03:41 98.1 °F (36.7 °C) 108 -- 138/84 102 91 % -- -- -- -- -- --    06/05/25 2302 98.1 °F (36.7 °C) 108 18 138/84 -- 91 % -- -- -- -- -- --    06/05/25 2230 -- 108 18 128/75 96 90 % 32 3 L/min Nasal cannula -- -- --    06/05/25 2213 -- -- -- -- -- -- -- -- -- -- -- No Pain    06/05/25 2200 -- 114 20 141/85 105 90 % 32 3 L/min Nasal cannula -- -- --    06/05/25 2144 -- -- -- -- -- 87 % 32 3 L/min Nasal cannula -- -- --    06/05/25 2130 -- 120 24 114/73 -- 90 % 32 3 L/min Nasal cannula -- -- --    06/05/25 2100 -- 121 22 140/82 102 96 % -- -- -- -- -- --    06/05/25 2037 -- -- -- -- -- -- -- -- -- -- -- No Pain    06/05/25 2030 -- 117 22 147/96 117 98 % -- -- -- -- -- --    06/05/25 1955 -- -- -- -- -- -- -- -- -- -- -- Med Not Given for Pain - for MAR use only    06/05/25 1945 -- -- -- -- -- -- -- -- Nasal cannula -- 15 --    06/05/25 1940 98.6 °F (37 °C) 128 20 148/89 111 94 % 36 4 L/min Nasal cannula Sitting -- No Pain          Weight (last 2 days)       None            Pertinent Labs/Diagnostic Results:   Radiology:  XR chest 1 view portable   ED Interpretation by Alberto Rene DO (06/05 2107)   No acute abnl, no ptx, effusion,  "infiltrate, no obvious rib fracture, no widened mediastinum.  Interpreted by me              Final Interpretation by Grupo De La Cruz MD (06/06 1427)      No acute cardiopulmonary disease.            Workstation performed: [id number]           Cardiology:  ECG 12 lead   Final Result by Adalid Ch MD (06/06 2107)   Sinus tachycardia   Otherwise normal ECG   When compared with ECG of 04-Dec-2022 09:44,   Vent. rate has increased by  54 bpm   Confirmed by Adalid Ch ([id number]) on 6/6/2025 9:07:49 PM            Results from last 7 days   Lab Units 06/05/25 2000   SARS-COV-2  Negative     Results from last 7 days   Lab Units 06/08/25 0807 06/07/25 0331 06/06/25 0450 06/05/25 2000   WBC Thousand/uL 12.07* 13.38* 14.21* 13.10*   HEMOGLOBIN g/dL 13.5 12.9 13.1 14.6   HEMATOCRIT % 41.5 39.8 39.9 43.7   PLATELETS Thousands/uL 321 337 350 410*   TOTAL NEUT ABS Thousands/µL 9.68*  --   --  11.19*         Results from last 7 days   Lab Units 06/08/25 0807 06/07/25 0331 06/06/25 0450 06/05/25 2000   SODIUM mmol/L 134* 136 136 133*   POTASSIUM mmol/L 4.0 4.3 4.2 4.3   CHLORIDE mmol/L 101 103 102 99   CO2 mmol/L 28 26 29 29   ANION GAP mmol/L 5 7 5 5   BUN mg/dL 16 13 15 19   CREATININE mg/dL 0.64 0.56* 0.55* 0.66   EGFR ml/min/1.73sq m 107 113 114 105   CALCIUM mg/dL 9.3 8.9 8.9 9.4   MAGNESIUM mg/dL  --   --  2.3 2.1   PHOSPHORUS mg/dL  --   --  3.2  --      Results from last 7 days   Lab Units 06/05/25 2000   AST U/L 18   ALT U/L 32   ALK PHOS U/L 63   TOTAL PROTEIN g/dL 6.7   ALBUMIN g/dL 3.9   TOTAL BILIRUBIN mg/dL 0.35         Results from last 7 days   Lab Units 06/08/25 0807 06/07/25 0331 06/06/25 0450 06/05/25 2000   GLUCOSE RANDOM mg/dL 142* 133 120 187*             No results found for: \"BETA-HYDROXYBUTYRATE\"       Results from last 7 days   Lab Units 06/05/25 2000   PH FRANCIS  7.389   PCO2 FRANCIS mm Hg 47.2   PO2 FRANCIS mm Hg 108.1*   HCO3 FRANCIS mmol/L 27.9   BASE EXC FRANCIS mmol/L 2.2   O2 CONTENT FRANCIS ml/dL " 20.7   O2 HGB, VENOUS % 96.2*       Results from last 7 days   Lab Units 06/05/25 2000   HS TNI 0HR ng/L 3     Results from last 7 days   Lab Units 06/05/25 2000   D-DIMER QUANTITATIVE ug/ml FEU 0.29             Results from last 7 days   Lab Units 06/05/25 2000   PROCALCITONIN ng/ml <0.05       Results from last 7 days   Lab Units 06/05/25 2000   BNP pg/mL 26       Results from last 7 days   Lab Units 06/05/25 2000   INFLUENZA A PCR  Negative   INFLUENZA B PCR  Negative   RSV PCR  Negative         Network Utilization Review Department  ATTENTION: Please call with any questions or concerns to [phone] and carefully listen to the prompts so that you are directed to the right person. All voicemails are confidential.   For Discharge needs, contact Care Management DC Support Team at [phone] opt. 2  Send all requests for admission clinical reviews, approved or denied determinations and any other requests to dedicated fax number below belonging to the campus where the patient is receiving treatment. List of dedicated fax numbers for the Facilities:  FACILITY NAME UR FAX NUMBER   ADMISSION DENIALS (Administrative/Medical Necessity) [phone]   DISCHARGE SUPPORT TEAM (NETWORK) [phone]   PARENT CHILD HEALTH (Maternity/NICU/Pediatrics) [phone]   [facility] [phone]   [facility] [phone]   [facility] [phone]   [facility] [phone]   [facility] [phone]   [facility] [phone]   [facility] [phone]   [facility] [phone]   [facility] [phone]   [facility] [phone]   [facility] [phone]   [facility] [phone]

## 2025-06-07 NOTE — ASSESSMENT & PLAN NOTE
Continue Solu-Medrol every 12 hours today, hopeful to transition to prednisone in a.m.    Appears to not have a true allergy to albuterol, other than tachycardia  Will start Anoro 1 puff daily, continue Xopenex 3 times daily  Continue Zithromax in the setting of COPD exacerbation -plan for 3 days total    At discharge can resume home Bevespi 2 puffs twice daily  Will switch albuterol to Xopenex in the setting of tachycardia  Will need prolonged prednisone taper  Would benefit from outpatient pulmonary rehabilitation

## 2025-06-07 NOTE — PLAN OF CARE
Problem: PAIN - ADULT  Goal: Verbalizes/displays adequate comfort level or baseline comfort level  Description: Interventions:  - Encourage patient to monitor pain and request assistance  - Assess pain using appropriate pain scale  - Administer analgesics as ordered based on type and severity of pain and evaluate response  - Implement non-pharmacological measures as appropriate and evaluate response  - Consider cultural and social influences on pain and pain management  - Notify physician/advanced practitioner if interventions unsuccessful or patient reports new pain  - Educate patient/family on pain management process including their role and importance of  reporting pain   - Provide non-pharmacologic/complimentary pain relief interventions  Outcome: Progressing     Problem: INFECTION - ADULT  Goal: Absence of fever/infection during neutropenic period  Description: INTERVENTIONS:  - Monitor WBC  - Perform strict hand hygiene  - Limit to healthy visitors only  - No plants, dried, fresh or silk flowers with rice in patient room  Outcome: Progressing     Problem: DISCHARGE PLANNING  Goal: Discharge to home or other facility with appropriate resources  Description: INTERVENTIONS:  - Identify barriers to discharge w/patient and caregiver  - Arrange for needed discharge resources and transportation as appropriate  - Identify discharge learning needs (meds, wound care, etc.)  - Arrange for interpretive services to assist at discharge as needed  - Refer to Case Management Department for coordinating discharge planning if the patient needs post-hospital services based on physician/advanced practitioner order or complex needs related to functional status, cognitive ability, or social support system  Outcome: Progressing

## 2025-06-07 NOTE — ASSESSMENT & PLAN NOTE
Recently discharged from the hospital last month on nasal cannula oxygen.  Patient reported at baseline he was on 2 L however had to increase to 3 L nasal cannula oxygen  Was briefly on 4 L nasal cannula oxygen in the emergency department as his oxygen was 87% on 3 L.  In the setting of COPD exacerbation  Wean off to maintain saturation greater than 89%  Stable on 2 L nasal cannula

## 2025-06-08 LAB
ANION GAP SERPL CALCULATED.3IONS-SCNC: 5 MMOL/L (ref 4–13)
BASOPHILS # BLD AUTO: 0.02 THOUSANDS/ÂΜL (ref 0–0.1)
BASOPHILS NFR BLD AUTO: 0 % (ref 0–1)
BUN SERPL-MCNC: 16 MG/DL (ref 5–25)
CALCIUM SERPL-MCNC: 9.3 MG/DL (ref 8.4–10.2)
CHLORIDE SERPL-SCNC: 101 MMOL/L (ref 96–108)
CO2 SERPL-SCNC: 28 MMOL/L (ref 21–32)
CREAT SERPL-MCNC: 0.64 MG/DL (ref 0.6–1.3)
EOSINOPHIL # BLD AUTO: 0 THOUSAND/ÂΜL (ref 0–0.61)
EOSINOPHIL NFR BLD AUTO: 0 % (ref 0–6)
ERYTHROCYTE [DISTWIDTH] IN BLOOD BY AUTOMATED COUNT: 12.3 % (ref 11.6–15.1)
GFR SERPL CREATININE-BSD FRML MDRD: 107 ML/MIN/1.73SQ M
GLUCOSE SERPL-MCNC: 142 MG/DL (ref 65–140)
HCT VFR BLD AUTO: 41.5 % (ref 36.5–49.3)
HGB BLD-MCNC: 13.5 G/DL (ref 12–17)
IMM GRANULOCYTES # BLD AUTO: 0.13 THOUSAND/UL (ref 0–0.2)
IMM GRANULOCYTES NFR BLD AUTO: 1 % (ref 0–2)
LYMPHOCYTES # BLD AUTO: 1.73 THOUSANDS/ÂΜL (ref 0.6–4.47)
LYMPHOCYTES NFR BLD AUTO: 14 % (ref 14–44)
MCH RBC QN AUTO: 30.5 PG (ref 26.8–34.3)
MCHC RBC AUTO-ENTMCNC: 32.5 G/DL (ref 31.4–37.4)
MCV RBC AUTO: 94 FL (ref 82–98)
MONOCYTES # BLD AUTO: 0.51 THOUSAND/ÂΜL (ref 0.17–1.22)
MONOCYTES NFR BLD AUTO: 4 % (ref 4–12)
NEUTROPHILS # BLD AUTO: 9.68 THOUSANDS/ÂΜL (ref 1.85–7.62)
NEUTS SEG NFR BLD AUTO: 81 % (ref 43–75)
NRBC BLD AUTO-RTO: 0 /100 WBCS
PLATELET # BLD AUTO: 321 THOUSANDS/UL (ref 149–390)
PMV BLD AUTO: 8.6 FL (ref 8.9–12.7)
POTASSIUM SERPL-SCNC: 4 MMOL/L (ref 3.5–5.3)
RBC # BLD AUTO: 4.43 MILLION/UL (ref 3.88–5.62)
SODIUM SERPL-SCNC: 134 MMOL/L (ref 135–147)
WBC # BLD AUTO: 12.07 THOUSAND/UL (ref 4.31–10.16)

## 2025-06-08 PROCEDURE — 99232 SBSQ HOSP IP/OBS MODERATE 35: CPT | Performed by: PHYSICIAN ASSISTANT

## 2025-06-08 PROCEDURE — 80048 BASIC METABOLIC PNL TOTAL CA: CPT | Performed by: PHYSICIAN ASSISTANT

## 2025-06-08 PROCEDURE — 94760 N-INVAS EAR/PLS OXIMETRY 1: CPT

## 2025-06-08 PROCEDURE — 85025 COMPLETE CBC W/AUTO DIFF WBC: CPT | Performed by: PHYSICIAN ASSISTANT

## 2025-06-08 PROCEDURE — 94640 AIRWAY INHALATION TREATMENT: CPT

## 2025-06-08 RX ORDER — SENNOSIDES 8.6 MG
1 TABLET ORAL
Status: DISCONTINUED | OUTPATIENT
Start: 2025-06-08 | End: 2025-06-10 | Stop reason: HOSPADM

## 2025-06-08 RX ORDER — DOCUSATE SODIUM 100 MG/1
100 CAPSULE, LIQUID FILLED ORAL 2 TIMES DAILY
Status: DISCONTINUED | OUTPATIENT
Start: 2025-06-08 | End: 2025-06-10 | Stop reason: HOSPADM

## 2025-06-08 RX ORDER — POLYETHYLENE GLYCOL 3350 17 G/17G
17 POWDER, FOR SOLUTION ORAL DAILY PRN
Status: DISCONTINUED | OUTPATIENT
Start: 2025-06-08 | End: 2025-06-10 | Stop reason: HOSPADM

## 2025-06-08 RX ADMIN — METHYLPREDNISOLONE SODIUM SUCCINATE 40 MG: 40 INJECTION, POWDER, FOR SOLUTION INTRAMUSCULAR; INTRAVENOUS at 22:08

## 2025-06-08 RX ADMIN — DOCUSATE SODIUM 100 MG: 100 CAPSULE, LIQUID FILLED ORAL at 12:17

## 2025-06-08 RX ADMIN — LEVALBUTEROL HYDROCHLORIDE 1.25 MG: 1.25 SOLUTION RESPIRATORY (INHALATION) at 19:41

## 2025-06-08 RX ADMIN — DOCUSATE SODIUM 100 MG: 100 CAPSULE, LIQUID FILLED ORAL at 17:18

## 2025-06-08 RX ADMIN — RISPERIDONE 1 MG: 0.5 TABLET, FILM COATED ORAL at 09:22

## 2025-06-08 RX ADMIN — RISPERIDONE 4 MG: 2 TABLET, FILM COATED ORAL at 22:07

## 2025-06-08 RX ADMIN — AMITRIPTYLINE HYDROCHLORIDE 50 MG: 50 TABLET, FILM COATED ORAL at 22:08

## 2025-06-08 RX ADMIN — FLUTICASONE PROPIONATE 2 SPRAY: 50 SPRAY, METERED NASAL at 09:23

## 2025-06-08 RX ADMIN — LEVALBUTEROL HYDROCHLORIDE 1.25 MG: 1.25 SOLUTION RESPIRATORY (INHALATION) at 13:32

## 2025-06-08 RX ADMIN — SENNOSIDES 8.6 MG: 8.6 TABLET, FILM COATED ORAL at 22:08

## 2025-06-08 RX ADMIN — METHYLPREDNISOLONE SODIUM SUCCINATE 40 MG: 40 INJECTION, POWDER, FOR SOLUTION INTRAMUSCULAR; INTRAVENOUS at 09:22

## 2025-06-08 RX ADMIN — DULOXETINE HYDROCHLORIDE 60 MG: 30 CAPSULE, DELAYED RELEASE ORAL at 17:18

## 2025-06-08 RX ADMIN — UMECLIDINIUM BROMIDE AND VILANTEROL TRIFENATATE 1 PUFF: 62.5; 25 POWDER RESPIRATORY (INHALATION) at 09:24

## 2025-06-08 RX ADMIN — DULOXETINE HYDROCHLORIDE 60 MG: 30 CAPSULE, DELAYED RELEASE ORAL at 09:22

## 2025-06-08 RX ADMIN — GUAIFENESIN 600 MG: 600 TABLET ORAL at 09:22

## 2025-06-08 RX ADMIN — GUAIFENESIN 600 MG: 600 TABLET ORAL at 17:18

## 2025-06-08 RX ADMIN — POLYETHYLENE GLYCOL 3350 17 G: 17 POWDER, FOR SOLUTION ORAL at 12:17

## 2025-06-08 RX ADMIN — LEVALBUTEROL HYDROCHLORIDE 1.25 MG: 1.25 SOLUTION RESPIRATORY (INHALATION) at 07:27

## 2025-06-08 RX ADMIN — ENOXAPARIN SODIUM 40 MG: 40 INJECTION SUBCUTANEOUS at 09:22

## 2025-06-08 NOTE — ASSESSMENT & PLAN NOTE
Patient admitted last month in a different hospital with COPD exacerbation.  Reported improvement however not with ambulation.  Patient reported shortness of breath with ambulation since, had to increase his oxygen to 3 L from 2 which is his baseline  Continue azithromycin x 3 days  Patient will need pulmonary rehab on discharge  Appreciate pulmonology recommendations  Started Anoro 1 puff daily while inpatient -resume Bevespi on discharge  Continue Xopenex 3 times daily  Start p.o. prednisone taper 40 mg daily x 3 days, taper by 10 mg every 3 days until complete.

## 2025-06-08 NOTE — ASSESSMENT & PLAN NOTE
Patient admitted last month in a different hospital with COPD exacerbation.  Reported improvement however not with ambulation.  Patient reported shortness of breath with ambulation since, had to increase his oxygen to 3 L from 2 which is his baseline  Continue azithromycin x 3 days  Patient will need pulmonary rehab on discharge  Appreciate pulmonology recommendations  Started Anoro 1 puff daily while inpatient -resume Bevespi on discharge  Continue Xopenex 3 times daily  Continue IV Solu-Medrol every 12 hours today pending pulm re-eval - noted expiratory wheezing on exam  Encourage OOB - discussed with nursing

## 2025-06-08 NOTE — PROGRESS NOTES
Progress Note - Hospitalist   Name: Bill Haji 60 y.o. male I MRN: [id number]  Unit/Bed#: -01 I Date of Admission: 6/5/2025   Date of Service: 6/8/2025 I Hospital Day: 1    Assessment & Plan  Moderate COPD with acute exacerbation (HCC)  Patient admitted last month in a different hospital with COPD exacerbation.  Reported improvement however not with ambulation.  Patient reported shortness of breath with ambulation since, had to increase his oxygen to 3 L from 2 which is his baseline  Continue azithromycin x 3 days  Patient will need pulmonary rehab on discharge  Appreciate pulmonology recommendations  Started Anoro 1 puff daily while inpatient -resume Bevespi on discharge  Continue Xopenex 3 times daily  Continue IV Solu-Medrol every 12 hours today pending pulm re-eval - noted expiratory wheezing on exam  Encourage OOB - discussed with nursing  Acute on chronic respiratory failure with hypoxia (HCC)  Recently discharged from the hospital last month on nasal cannula oxygen.  Patient reported at baseline he was on 2 L however had to increase to 3 L nasal cannula oxygen  Was briefly on 4 L nasal cannula oxygen in the emergency department as his oxygen was 87% on 3 L.  In the setting of COPD exacerbation  Wean off to maintain saturation greater than 89%  Stable on 2 L nasal cannula    Schizophrenia (HCC)  Continue risperidone.  Generalized anxiety disorder  Continue amitriptyline and Cymbalta.    VTE Pharmacologic Prophylaxis: VTE Score: 4 Moderate Risk (Score 3-4) - Pharmacological DVT Prophylaxis Ordered: enoxaparin (Lovenox).    Mobility:   Basic Mobility Inpatient Raw Score: 24  JH-HLM Goal: 8: Walk 250 feet or more  JH-HLM Achieved: 7: Walk 25 feet or more  JH-HLM Goal NOT achieved. Continue with multidisciplinary rounding and encourage appropriate mobility to improve upon JH-HLM goals.    Patient Centered Rounds: I performed bedside rounds with nursing staff today.   Discussions with Specialists or Other  Care Team Provider: Pulmonology    Education and Discussions with Family / Patient: Updated  (brother) via phone.    Current Length of Stay: 1 day(s)  Current Patient Status: Inpatient   Certification Statement: The patient will continue to require additional inpatient hospital stay due to weaning IV steroids  Discharge Plan: Anticipate discharge tomorrow to home.    Code Status: Level 1 - Full Code    Subjective   Patient continues to feel well, does note shortness of breath on exertion.  Reproducible chest wall pain with coughing.  Denies palpitations, nausea/vomiting, abdominal pain, fever/chills.    Objective :  Temp:  [97.9 °F (36.6 °C)-99 °F (37.2 °C)] 97.9 °F (36.6 °C)  HR:  [] 107  BP: (113-129)/(76-81) 113/78  Resp:  [18-20] 18  SpO2:  [88 %-95 %] 88 %  O2 Device: Nasal cannula  Nasal Cannula O2 Flow Rate (L/min):  [2 L/min] 2 L/min    Body mass index is 18.71 kg/m².     Input and Output Summary (last 24 hours):     Intake/Output Summary (Last 24 hours) at 6/8/2025 0820  Last data filed at 6/8/2025 0613  Gross per 24 hour   Intake 2375 ml   Output 4125 ml   Net -1750 ml       Physical Exam  Vitals and nursing note reviewed.   Constitutional:       Appearance: Normal appearance.      Comments: No acute distress   HENT:      Head: Normocephalic.     Eyes:      General: No scleral icterus.     Extraocular Movements: Extraocular movements intact.      Conjunctiva/sclera: Conjunctivae normal.       Cardiovascular:      Rate and Rhythm: Normal rate and regular rhythm.      Heart sounds: Normal heart sounds. No murmur heard.  Pulmonary:      Effort: Pulmonary effort is normal. No respiratory distress.      Breath sounds: Decreased breath sounds and wheezing present.   Abdominal:      General: Bowel sounds are normal.      Palpations: Abdomen is soft.      Tenderness: There is no abdominal tenderness. There is no guarding or rebound.     Musculoskeletal:      Cervical back: Normal range of  motion.      Comments: Able to move upper/lower extremities bilaterally, no edema     Skin:     General: Skin is warm and dry.     Neurological:      Mental Status: He is alert and oriented to person, place, and time.     Psychiatric:         Mood and Affect: Mood normal.         Speech: Speech normal.         Behavior: Behavior normal.           Lines/Drains:              Lab Results: I have reviewed the following results:   Results from last 7 days   Lab Units 06/08/25  0807   WBC Thousand/uL 12.07*   HEMOGLOBIN g/dL 13.5   HEMATOCRIT % 41.5   PLATELETS Thousands/uL 321   SEGS PCT % 81*   LYMPHO PCT % 14   MONO PCT % 4   EOS PCT % 0     Results from last 7 days   Lab Units 06/07/25  0331 06/06/25  0450 06/05/25 2000   SODIUM mmol/L 136   < > 133*   POTASSIUM mmol/L 4.3   < > 4.3   CHLORIDE mmol/L 103   < > 99   CO2 mmol/L 26   < > 29   BUN mg/dL 13   < > 19   CREATININE mg/dL 0.56*   < > 0.66   ANION GAP mmol/L 7   < > 5   CALCIUM mg/dL 8.9   < > 9.4   ALBUMIN g/dL  --   --  3.9   TOTAL BILIRUBIN mg/dL  --   --  0.35   ALK PHOS U/L  --   --  63   ALT U/L  --   --  32   AST U/L  --   --  18   GLUCOSE RANDOM mg/dL 133   < > 187*    < > = values in this interval not displayed.                 Results from last 7 days   Lab Units 06/05/25 2000   PROCALCITONIN ng/ml <0.05       Recent Cultures (last 7 days):         Imaging Results Review: No pertinent imaging studies reviewed.  Other Study Results Review: No additional pertinent studies reviewed.    Last 24 Hours Medication List:     Current Facility-Administered Medications:     acetaminophen (TYLENOL) tablet 650 mg, Q4H PRN    amitriptyline (ELAVIL) tablet 50 mg, HS PRN    DULoxetine (CYMBALTA) delayed release capsule 60 mg, BID    enoxaparin (LOVENOX) subcutaneous injection 40 mg, Daily    fluticasone (FLONASE) 50 mcg/act nasal spray 2 spray, Daily    guaiFENesin (MUCINEX) 12 hr tablet 600 mg, BID    levalbuterol (XOPENEX) inhalation solution 1.25 mg, TID **AND**  [DISCONTINUED] sodium chloride 0.9 % inhalation solution 3 mL, TID    loratadine (CLARITIN) tablet 10 mg, Daily PRN    methylPREDNISolone sodium succinate (Solu-MEDROL) injection 40 mg, Q12H ALONDRA    ondansetron (ZOFRAN) injection 4 mg, Q6H PRN    risperiDONE (RisperDAL) tablet 1 mg, QAM    risperiDONE (RisperDAL) tablet 4 mg, HS    umeclidinium-vilanterol 62.5-25 mcg/actuation inhaler 1 puff, Daily    Administrative Statements   Today, Patient Was Seen By: Miracle Warner PA-C  I have spent a total time of 35 minutes in caring for this patient on the day of the visit/encounter including Diagnostic results, Instructions for management, Patient and family education, Importance of tx compliance, Risk factor reductions, Impressions, Counseling / Coordination of care, Documenting in the medical record, Reviewing/placing orders in the medical record (including tests, medications, and/or procedures), and Communicating with other healthcare professionals .    **Please Note: This note may have been constructed using a voice recognition system.**

## 2025-06-08 NOTE — DISCHARGE SUMMARY
Discharge Summary - Hospitalist   Name: Bill Haji 60 y.o. male I MRN: [id number]  Unit/Bed#: -01 I Date of Admission: 6/5/2025   Date of Service: 6/10/2025 I Hospital Day: 3     Assessment & Plan  Moderate COPD with acute exacerbation (HCC)  Patient admitted last month in a different hospital with COPD exacerbation.  Reported improvement however not with ambulation.  Patient reported shortness of breath with ambulation since, had to increase his oxygen to 3 L from 2 which is his baseline  Continue azithromycin x 3 days  Patient will need pulmonary rehab on discharge  Appreciate pulmonology recommendations  Started Anoro 1 puff daily while inpatient -resume Bevespi on discharge  Continue Xopenex 3 times daily  Start p.o. prednisone taper 40 mg daily x 3 days, taper by 10 mg every 3 days until complete.  Acute on chronic respiratory failure with hypoxia (HCC)  Recently discharged from the hospital last month on nasal cannula oxygen.  Patient reported at baseline he was on 2 L however had to increase to 3 L nasal cannula oxygen  Was briefly on 4 L nasal cannula oxygen in the emergency department as his oxygen was 87% on 3 L.  In the setting of COPD exacerbation  Wean off to maintain saturation greater than 89%  Stable on 2 L nasal cannula  Schizophrenia (HCC)  Continue risperidone.  Generalized anxiety disorder  Continue amitriptyline and Cymbalta.  Leukocytosis  Per chart review, noted elevation of WBC count at 13.08, but likely not infectious and related to steroid use.  Continue to monitor leukocytosis.  Patient denying any fever or chills.  Continue to monitor off IV antibiotics.     Medical Problems       Resolved Problems  Date Reviewed: 4/18/2025   None       Discharging Physician / Practitioner: Lorenzo Almanza PA-C  PCP: Maria T Turner DO  Admission Date:   Admission Orders (From admission, onward)       Ordered        06/07/25 1301  INPATIENT ADMISSION  Once            06/05/25 2230  Place in  Observation  Once                          Discharge Date: 06/10/25    Next Steps for Physician/AP Assuming Care:  Follow up with pulmonology and pulmonary rehab on discharge    Test Results Pending at Discharge (will require follow up):  None    Medication Changes for Discharge & Rationale:   Switched albuterol to levalbuterol  2-week course of prednisone  See after visit summary for reconciled discharge medications provided to patient and/or family.     Consultations During Hospital Stay:  Pulmonology    Procedures Performed:   None    Significant Findings / Test Results:   CXR no acute cardiopulmonary disease  COVID/influenza/RSV negative    Incidental Findings:   None    Hospital Course:   Bill Haji is a 60 y.o. male patient who originally presented to the hospital on 6/5/2025 due to shortness of breath.    Past medical history significant for COPD chronically on 2-3 L nasal cannula, hypertension, anxiety, schizophrenia.  Patient presented to the emergency department due to worsening shortness of breath, found to be in COPD exacerbation.  Patient was started on scheduled nebulizer treatments and IV Solu-Medrol.  Pulmonology was consulted.  Briefly required up titration and oxygen to 4 L nasal cannula.  Completed 3 days of azithromycin.  IV Solu-Medrol was able to be weaned prior to discharge, and patient was transitioned to baseline oxygen requirement.  Patient will be discharged on prolonged prednisone taper.  Pulmonology recommended referral to pulmonary rehab.  On day of discharge patient was afebrile, hemodynamically stable and verbalized understanding for requested outpatient follow-up.    Please see above list of diagnoses and related plan for additional information.     Discharge Day Visit / Exam:   Subjective:  no wheezing, on home O2, stable today  Vitals: Blood Pressure: 96/70 (06/10/25 0347)  Pulse: 85 (06/10/25 0347)  Temperature: 98.3 °F (36.8 °C) (06/10/25 0347)  Temp Source: Oral (06/09/25  "9443)  Respirations: 20 (06/09/25 1448)  Height: 5' 3\" (160 cm) (06/05/25 2342)  Weight - Scale: 47.9 kg (105 lb 9.6 oz) (06/05/25 2342)  SpO2: 93 % (06/10/25 0347)  Physical Exam  Vitals and nursing note reviewed.   Constitutional:       General: He is not in acute distress.     Appearance: Normal appearance. He is not ill-appearing.      Interventions: Nasal cannula in place.   HENT:      Head: Normocephalic and atraumatic.     Eyes:      General: No scleral icterus.        Right eye: No discharge.         Left eye: No discharge.       Cardiovascular:      Rate and Rhythm: Normal rate and regular rhythm.      Pulses: Normal pulses.      Heart sounds: No murmur heard.     No friction rub. No gallop.   Pulmonary:      Effort: Pulmonary effort is normal. No respiratory distress.      Breath sounds: Normal breath sounds. No wheezing, rhonchi or rales.   Abdominal:      General: Bowel sounds are normal. There is no distension.      Palpations: Abdomen is soft.      Tenderness: There is no abdominal tenderness. There is no guarding or rebound.     Musculoskeletal:         General: No swelling or deformity.      Cervical back: Neck supple.      Right lower leg: No edema.      Left lower leg: No edema.     Skin:     General: Skin is warm and dry.      Capillary Refill: Capillary refill takes less than 2 seconds.      Coloration: Skin is not jaundiced or pale.      Findings: No erythema or rash.     Neurological:      General: No focal deficit present.      Mental Status: He is alert and oriented to person, place, and time. Mental status is at baseline.     Psychiatric:         Mood and Affect: Mood normal.         Behavior: Behavior normal.          Discussion with Family: Patient declined call to .     Discharge instructions/Information to patient and family:   See after visit summary for information provided to patient and family.      Provisions for Follow-Up Care:  See after visit summary for information " related to follow-up care and any pertinent home health orders.      Mobility at time of Discharge:   Basic Mobility Inpatient Raw Score: 24  JH-HLM Goal: 8: Walk 250 feet or more  JH-HLM Achieved: 8: Walk 250 feet ot more  HLM Goal achieved. Continue to encourage appropriate mobility.     Disposition:   Home with VNA Services (Reminder: Complete face to face encounter)    Planned Readmission: none    Administrative Statements   Discharge Statement:  I have spent a total time of 45 minutes in caring for this patient on the day of the visit/encounter. >30 minutes of time was spent on: Diagnostic results, Prognosis, Risks and benefits of tx options, Instructions for management, Patient and family education, Importance of tx compliance, Risk factor reductions, Impressions, Counseling / Coordination of care, Documenting in the medical record, Reviewing / ordering tests, medicine, procedures  , and Communicating with other healthcare professionals .    **Please Note: This note may have been constructed using a voice recognition system**

## 2025-06-08 NOTE — PLAN OF CARE
Problem: PAIN - ADULT  Goal: Verbalizes/displays adequate comfort level or baseline comfort level  Description: Interventions:  - Encourage patient to monitor pain and request assistance  - Assess pain using appropriate pain scale  - Administer analgesics as ordered based on type and severity of pain and evaluate response  - Implement non-pharmacological measures as appropriate and evaluate response  - Consider cultural and social influences on pain and pain management  - Notify physician/advanced practitioner if interventions unsuccessful or patient reports new pain  - Educate patient/family on pain management process including their role and importance of  reporting pain   - Provide non-pharmacologic/complimentary pain relief interventions  Outcome: Progressing     Problem: INFECTION - ADULT  Goal: Absence of fever/infection during neutropenic period  Description: INTERVENTIONS:  - Monitor WBC  - Perform strict hand hygiene  - Limit to healthy visitors only  - No plants, dried, fresh or silk flowers with rice in patient room  Outcome: Progressing     Problem: Knowledge Deficit  Goal: Patient/family/caregiver demonstrates understanding of disease process, treatment plan, medications, and discharge instructions  Description: Complete learning assessment and assess knowledge base.  Interventions:  - Provide teaching at level of understanding  - Provide teaching via preferred learning methods  Outcome: Progressing

## 2025-06-09 PROBLEM — D72.829 LEUKOCYTOSIS: Status: ACTIVE | Noted: 2025-06-09

## 2025-06-09 LAB
ANION GAP SERPL CALCULATED.3IONS-SCNC: 7 MMOL/L (ref 4–13)
BASOPHILS # BLD AUTO: 0.01 THOUSANDS/ÂΜL (ref 0–0.1)
BASOPHILS NFR BLD AUTO: 0 % (ref 0–1)
BUN SERPL-MCNC: 15 MG/DL (ref 5–25)
CALCIUM SERPL-MCNC: 9.8 MG/DL (ref 8.4–10.2)
CHLORIDE SERPL-SCNC: 101 MMOL/L (ref 96–108)
CO2 SERPL-SCNC: 28 MMOL/L (ref 21–32)
CREAT SERPL-MCNC: 0.62 MG/DL (ref 0.6–1.3)
EOSINOPHIL # BLD AUTO: 0 THOUSAND/ÂΜL (ref 0–0.61)
EOSINOPHIL NFR BLD AUTO: 0 % (ref 0–6)
ERYTHROCYTE [DISTWIDTH] IN BLOOD BY AUTOMATED COUNT: 12.5 % (ref 11.6–15.1)
GFR SERPL CREATININE-BSD FRML MDRD: 108 ML/MIN/1.73SQ M
GLUCOSE SERPL-MCNC: 150 MG/DL (ref 65–140)
HCT VFR BLD AUTO: 41.9 % (ref 36.5–49.3)
HGB BLD-MCNC: 13.5 G/DL (ref 12–17)
IMM GRANULOCYTES # BLD AUTO: 0.15 THOUSAND/UL (ref 0–0.2)
IMM GRANULOCYTES NFR BLD AUTO: 1 % (ref 0–2)
LYMPHOCYTES # BLD AUTO: 0.98 THOUSANDS/ÂΜL (ref 0.6–4.47)
LYMPHOCYTES NFR BLD AUTO: 8 % (ref 14–44)
MCH RBC QN AUTO: 30.7 PG (ref 26.8–34.3)
MCHC RBC AUTO-ENTMCNC: 32.2 G/DL (ref 31.4–37.4)
MCV RBC AUTO: 95 FL (ref 82–98)
MONOCYTES # BLD AUTO: 0.39 THOUSAND/ÂΜL (ref 0.17–1.22)
MONOCYTES NFR BLD AUTO: 3 % (ref 4–12)
NEUTROPHILS # BLD AUTO: 11.55 THOUSANDS/ÂΜL (ref 1.85–7.62)
NEUTS SEG NFR BLD AUTO: 88 % (ref 43–75)
NRBC BLD AUTO-RTO: 0 /100 WBCS
PLATELET # BLD AUTO: 317 THOUSANDS/UL (ref 149–390)
PMV BLD AUTO: 8.7 FL (ref 8.9–12.7)
POTASSIUM SERPL-SCNC: 4.6 MMOL/L (ref 3.5–5.3)
RBC # BLD AUTO: 4.4 MILLION/UL (ref 3.88–5.62)
SODIUM SERPL-SCNC: 136 MMOL/L (ref 135–147)
WBC # BLD AUTO: 13.08 THOUSAND/UL (ref 4.31–10.16)

## 2025-06-09 PROCEDURE — 99232 SBSQ HOSP IP/OBS MODERATE 35: CPT

## 2025-06-09 PROCEDURE — 80048 BASIC METABOLIC PNL TOTAL CA: CPT | Performed by: PHYSICIAN ASSISTANT

## 2025-06-09 PROCEDURE — 94640 AIRWAY INHALATION TREATMENT: CPT

## 2025-06-09 PROCEDURE — 99232 SBSQ HOSP IP/OBS MODERATE 35: CPT | Performed by: INTERNAL MEDICINE

## 2025-06-09 PROCEDURE — 94760 N-INVAS EAR/PLS OXIMETRY 1: CPT

## 2025-06-09 PROCEDURE — 85025 COMPLETE CBC W/AUTO DIFF WBC: CPT | Performed by: PHYSICIAN ASSISTANT

## 2025-06-09 RX ORDER — CALCIUM CARBONATE 500 MG/1
500 TABLET, CHEWABLE ORAL DAILY PRN
Status: DISCONTINUED | OUTPATIENT
Start: 2025-06-09 | End: 2025-06-10 | Stop reason: HOSPADM

## 2025-06-09 RX ORDER — PANTOPRAZOLE SODIUM 40 MG/1
40 TABLET, DELAYED RELEASE ORAL
Status: DISCONTINUED | OUTPATIENT
Start: 2025-06-10 | End: 2025-06-10 | Stop reason: HOSPADM

## 2025-06-09 RX ADMIN — RISPERIDONE 1 MG: 0.5 TABLET, FILM COATED ORAL at 09:09

## 2025-06-09 RX ADMIN — FLUTICASONE PROPIONATE 2 SPRAY: 50 SPRAY, METERED NASAL at 09:17

## 2025-06-09 RX ADMIN — RISPERIDONE 4 MG: 2 TABLET, FILM COATED ORAL at 21:39

## 2025-06-09 RX ADMIN — LEVALBUTEROL HYDROCHLORIDE 1.25 MG: 1.25 SOLUTION RESPIRATORY (INHALATION) at 19:29

## 2025-06-09 RX ADMIN — DOCUSATE SODIUM 100 MG: 100 CAPSULE, LIQUID FILLED ORAL at 18:20

## 2025-06-09 RX ADMIN — CALCIUM CARBONATE (ANTACID) CHEW TAB 500 MG 500 MG: 500 CHEW TAB at 15:14

## 2025-06-09 RX ADMIN — METHYLPREDNISOLONE SODIUM SUCCINATE 40 MG: 40 INJECTION, POWDER, FOR SOLUTION INTRAMUSCULAR; INTRAVENOUS at 09:08

## 2025-06-09 RX ADMIN — METHYLPREDNISOLONE SODIUM SUCCINATE 40 MG: 40 INJECTION, POWDER, FOR SOLUTION INTRAMUSCULAR; INTRAVENOUS at 21:39

## 2025-06-09 RX ADMIN — LEVALBUTEROL HYDROCHLORIDE 1.25 MG: 1.25 SOLUTION RESPIRATORY (INHALATION) at 14:14

## 2025-06-09 RX ADMIN — ENOXAPARIN SODIUM 40 MG: 40 INJECTION SUBCUTANEOUS at 09:08

## 2025-06-09 RX ADMIN — DULOXETINE HYDROCHLORIDE 60 MG: 30 CAPSULE, DELAYED RELEASE ORAL at 09:08

## 2025-06-09 RX ADMIN — UMECLIDINIUM BROMIDE AND VILANTEROL TRIFENATATE 1 PUFF: 62.5; 25 POWDER RESPIRATORY (INHALATION) at 09:16

## 2025-06-09 RX ADMIN — DULOXETINE HYDROCHLORIDE 60 MG: 30 CAPSULE, DELAYED RELEASE ORAL at 18:20

## 2025-06-09 RX ADMIN — DOCUSATE SODIUM 100 MG: 100 CAPSULE, LIQUID FILLED ORAL at 09:08

## 2025-06-09 RX ADMIN — GUAIFENESIN 600 MG: 600 TABLET ORAL at 18:20

## 2025-06-09 RX ADMIN — SENNOSIDES 8.6 MG: 8.6 TABLET, FILM COATED ORAL at 21:40

## 2025-06-09 RX ADMIN — GUAIFENESIN 600 MG: 600 TABLET ORAL at 09:09

## 2025-06-09 RX ADMIN — LEVALBUTEROL HYDROCHLORIDE 1.25 MG: 1.25 SOLUTION RESPIRATORY (INHALATION) at 07:33

## 2025-06-09 NOTE — ASSESSMENT & PLAN NOTE
Per chart review, patient admitted last month at different hospital with COPD exacerbation.  Patient did report improvement, but but did not have improvement with ambulation.  Per review, patient with reported shortness of breath with exertion, where he had to increase oxygen to 3 L.    Appreciate pulmonology recommendations.  Continue Anoro 1 puff, daily-while inpatient   Will resume Bevespi on discharge.    Continue Xopenex, 3 times daily.  Continue IV Solu-Medrol, every 12 hours.    Will anticipate switching to  PO prednisone 40 mg, then tapering by 10 mg, every 3 days starting tomorrow.   Patient need outpatient follow-up pulmonology.  Patient will need outpatient follow-up for pulmonary rehab.  Continue to encourage ambulation, as tolerated.

## 2025-06-09 NOTE — PLAN OF CARE
Problem: PAIN - ADULT  Goal: Verbalizes/displays adequate comfort level or baseline comfort level  Description: Interventions:  - Encourage patient to monitor pain and request assistance  - Assess pain using appropriate pain scale  - Administer analgesics as ordered based on type and severity of pain and evaluate response  - Implement non-pharmacological measures as appropriate and evaluate response  - Consider cultural and social influences on pain and pain management  - Notify physician/advanced practitioner if interventions unsuccessful or patient reports new pain  - Educate patient/family on pain management process including their role and importance of  reporting pain   - Provide non-pharmacologic/complimentary pain relief interventions  Outcome: Progressing     Problem: INFECTION - ADULT  Goal: Absence or prevention of progression during hospitalization  Description: INTERVENTIONS:  - Assess and monitor for signs and symptoms of infection  - Monitor lab/diagnostic results  - Monitor all insertion sites, i.e. indwelling lines, tubes, and drains  - Monitor endotracheal if appropriate and nasal secretions for changes in amount and color  - [place] appropriate cooling/warming therapies per order  - Administer medications as ordered  - Instruct and encourage patient and family to use good hand hygiene technique  - Identify and instruct in appropriate isolation precautions for identified infection/condition  Outcome: Progressing

## 2025-06-09 NOTE — ASSESSMENT & PLAN NOTE
Transition to oral prednisone starting at 40 mg a day decreasing by 10 mg every 3 days    Appears to not have a true allergy to albuterol, other than tachycardia  Will start Anoro 1 puff daily, continue Xopenex 3 times daily  Finish Zithromax    At discharge can resume home Bevespi 2 puffs twice daily  Will switch albuterol to Xopenex in the setting of tachycardia  Will need prolonged prednisone taper  Would benefit from outpatient pulmonary rehabilitation

## 2025-06-09 NOTE — PROGRESS NOTES
Progress Note - Pulmonology   Name: Bill Haji 60 y.o. male I MRN: [id number]  Unit/Bed#: -01 I Date of Admission: 6/5/2025   Date of Service: 6/9/2025 I Hospital Day: 2    Assessment & Plan  Moderate COPD with acute exacerbation (HCC)  Transition to oral prednisone starting at 40 mg a day decreasing by 10 mg every 3 days    Appears to not have a true allergy to albuterol, other than tachycardia  Will start Anoro 1 puff daily, continue Xopenex 3 times daily  Finish Zithromax    At discharge can resume home Bevespi 2 puffs twice daily  Will switch albuterol to Xopenex in the setting of tachycardia  Will need prolonged prednisone taper  Would benefit from outpatient pulmonary rehabilitation  Acute on chronic respiratory failure with hypoxia (HCC)  Recently started on 3L O2  Needed 4L briefly in the ED    Plan:  Supplemental oxygen to maintain sats >88%    Dependence on nicotine from cigarettes  Has gone from smoking cigarettes to cigars and most recently vaping  His brother reports none since hospitalization    Needs to remain complete smoking cessation    24 Hour Events : I feel better  Subjective : Patient feels improved back to baseline oxygen able to ambulate, no cough or wheeze    Objective :  Temp:  [97.7 °F (36.5 °C)-98.9 °F (37.2 °C)] 97.7 °F (36.5 °C)  HR:  [] 108  BP: (100-128)/(75-88) 100/75  Resp:  [18-20] 20  SpO2:  [89 %-95 %] 93 %  O2 Device: Nasal cannula  Nasal Cannula O2 Flow Rate (L/min):  [2 L/min] 2 L/min    Physical Exam  Constitutional:       Appearance: He is well-developed.   HENT:      Head: Normocephalic and atraumatic.     Eyes:      Pupils: Pupils are equal, round, and reactive to light.       Cardiovascular:      Rate and Rhythm: Normal rate and regular rhythm.      Heart sounds: No murmur heard.  Pulmonary:      Effort: Pulmonary effort is normal. No respiratory distress.      Breath sounds: Normal breath sounds. No wheezing or rales.   Abdominal:      Palpations: Abdomen  is soft.     Musculoskeletal:      Cervical back: Normal range of motion and neck supple.     Skin:     General: Skin is warm and dry.     Neurological:      Mental Status: He is alert and oriented to person, place, and time.           Lab Results: I have reviewed the following results:   .     06/09/25  0437   WBC 13.08*   HGB 13.5   HCT 41.9      SODIUM 136   K 4.6      CO2 28   BUN 15   CREATININE 0.62   GLUC 150*     ABG: No new results in last 24 hours.    Imaging Results Review: I personally reviewed the following image studies in PACS and associated radiology reports: chest xray. My interpretation of the radiology images/reports is:  .  Other Study Results Review: No additional pertinent studies reviewed.  PFT Results Reviewed: None available

## 2025-06-09 NOTE — CASE MANAGEMENT
"   Case Management Discharge Planning Note    Patient name Bill Haji  Location /-01 MRN [id number]  : 1965 Date 2025       Current Admission Date: 2025  Current Admission Diagnosis:Moderate COPD with acute exacerbation (HCC)   Patient Active Problem List    Diagnosis Date Noted    Leukocytosis 2025    Acute on chronic respiratory failure with hypoxia (HCC) 2025    Psoriasis 10/25/2024    Tobacco use disorder, mild, abuse 2023    Trauma and stressor-related disorder 2023    Schizophrenia (HCC) 2023    Generalized anxiety disorder 2023    SOB (shortness of breath)     Depression 2022    Dependence on nicotine from cigarettes 2022    Osteoporosis 2020    Spinal stenosis of cervical region 2020    Moderate COPD with acute exacerbation (HCC) 2015      LOS (days): 2  Geometric Mean LOS (GMLOS) (days):   Days to GMLOS:     OBJECTIVE:  Risk of Unplanned Readmission Score: 11.26         Current admission status: Inpatient   Preferred Pharmacy:   [id number] [place] [place] - [address] [facility]  [address]  [place] [address]  Phone: [phone] Fax: [phone]    Primary Care Provider: Maria T Turner DO    Primary Insurance: [facility]  Secondary Insurance:     DISCHARGE DETAILS:                                          Other Referral/Resources/Interventions Provided:  Interventions: Outpatient   Referral Comments: Per CM admin, \"looks like my co-worker Araseli has the baton on this one so we did receive! She will review and assign follow up if appropriate\" Per [facility], pt could ask for MH CM during therapy sessions. CM broached both dc resources to pt, and pt expressed agreement. CM encourage pt to discuss MH CM resources with therapist when next scheduled for session.                                             IMM Given (Date):: 06/10/25  IMM Given to:: " Patient      IMM reviewed with pt at bedside. Pt is aware of the right to appeal hospital discharge.

## 2025-06-09 NOTE — PROGRESS NOTES
Progress Note - Hospitalist   Name: Bill Haji 60 y.o. male I MRN: [id number]  Unit/Bed#: -01 I Date of Admission: 6/5/2025   Date of Service: 6/9/2025 I Hospital Day: 2    Assessment & Plan  Moderate COPD with acute exacerbation (HCC)  Per chart review, patient admitted last month at different hospital with COPD exacerbation.  Patient did report improvement, but but did not have improvement with ambulation.  Per review, patient with reported shortness of breath with exertion, where he had to increase oxygen to 3 L.    Appreciate pulmonology recommendations.  Continue Anoro 1 puff, daily-while inpatient   Will resume Bevespi on discharge.    Continue Xopenex, 3 times daily.  Continue IV Solu-Medrol, every 12 hours.    Will anticipate switching to  PO prednisone 40 mg, then tapering by 10 mg, every 3 days starting tomorrow.   Patient need outpatient follow-up pulmonology.  Patient will need outpatient follow-up for pulmonary rehab.  Continue to encourage ambulation, as tolerated.  Acute on chronic respiratory failure with hypoxia (HCC)  Per chart review, patient requiring additional supplemental oxygenation, where baseline is 2 L via nasal cannula.    In ED, noted patient requiring 4 L via nasal cannula, but now improved.    Patient with acute on chronic respiratory failure   secondary to COPD exacerbation.    Continue to titrate oxygen, maintaining O2 sats > 89%.   Patient now stable on baseline 2 L via nasal cannula, but still with noted wheezing secondary to resolving COPD exacerbation.  Schizophrenia (HCC)  Home Regimen:   Risperidone 1 mg, daily  Risperidone 4 mg, daily at bedtime     Continue home regimen.   Generalized anxiety disorder  Continue amitriptyline 50 mg, daily at bedtime PRN.   Continue duloxetine 60 mg, twice daily.    Leukocytosis  Per chart review, noted elevation of WBC count at 13.08, but likely not infectious and related to steroid use.  Continue to monitor leukocytosis.  Patient  denying any fever or chills.  Continue to monitor off IV antibiotics.    VTE Pharmacologic Prophylaxis: VTE Score: 4 Moderate Risk (Score 3-4) - Pharmacological DVT Prophylaxis Ordered: enoxaparin (Lovenox).    Mobility:   Basic Mobility Inpatient Raw Score: 24  JH-HLM Goal: 8: Walk 250 feet or more  JH-HLM Achieved: 8: Walk 250 feet ot more  JH-HLM Goal achieved. Continue to encourage appropriate mobility.    Patient Centered Rounds: I performed bedside rounds with nursing staff today.   Discussions with Specialists or Other Care Team Provider: Pulmonology     Education and Discussions with Family / Patient: Updated  (brother) via phone.    Current Length of Stay: 2 day(s)  Current Patient Status: Inpatient   Certification Statement: The patient will continue to require additional inpatient hospital stay due to resolving COPD exacerbation, IV steroids, and clinical improvement.  Discharge Plan: Anticipate discharge tomorrow to home.    Code Status: Level 1 - Full Code    Subjective   The patient was seen and examined at the bedside this morning. The patient expressing that he is feeling much better, where he notes that he is not as SOB compared to prior. The patient does express that he has been getting up and OOB, but has not been up much yet today. The patient expressing that he is eating and drinking     Objective :  Temp:  [97.7 °F (36.5 °C)-98.9 °F (37.2 °C)] 97.7 °F (36.5 °C)  HR:  [] 108  BP: (100-128)/(75-88) 100/75  Resp:  [18-20] 20  SpO2:  [89 %-95 %] 93 %  O2 Device: Nasal cannula  Nasal Cannula O2 Flow Rate (L/min):  [2 L/min] 2 L/min    Body mass index is 18.71 kg/m².     Input and Output Summary (last 24 hours):     Intake/Output Summary (Last 24 hours) at 6/9/2025 1301  Last data filed at 6/9/2025 1033  Gross per 24 hour   Intake 780 ml   Output 2960 ml   Net -2180 ml       Physical Exam  Vitals and nursing note reviewed.   Constitutional:       General: He is awake. He is not in  acute distress.     Appearance: He is not ill-appearing.   HENT:      Head: Normocephalic and atraumatic.     Eyes:      General: No scleral icterus.     Conjunctiva/sclera: Conjunctivae normal.       Cardiovascular:      Rate and Rhythm: Normal rate and regular rhythm.      Heart sounds: Normal heart sounds, S1 normal and S2 normal. No murmur heard.  Pulmonary:      Effort: Pulmonary effort is normal. No respiratory distress.      Breath sounds: No decreased air movement. Examination of the right-lower field reveals wheezing. Examination of the left-lower field reveals wheezing. Wheezing (bilat expiratory wheezing) present. No decreased breath sounds, rhonchi or rales.   Abdominal:      General: Bowel sounds are normal. There is no distension.      Palpations: Abdomen is soft.      Tenderness: There is no abdominal tenderness.     Musculoskeletal:      Right lower leg: No edema.      Left lower leg: No edema.     Skin:     General: Skin is warm and dry.      Comments: On exam, no evidence of open lesions/wounds on exposed skin.      Neurological:      Mental Status: He is alert and oriented to person, place, and time.      Sensory: Sensation is intact.      Motor: Motor function is intact.     Psychiatric:         Attention and Perception: Attention normal.         Mood and Affect: Affect is flat.         Speech: Speech normal.         Behavior: Behavior is cooperative.       Lines/Drains:      Lab Results: I have reviewed the following results:   Results from last 7 days   Lab Units 06/09/25  0437   WBC Thousand/uL 13.08*   HEMOGLOBIN g/dL 13.5   HEMATOCRIT % 41.9   PLATELETS Thousands/uL 317   SEGS PCT % 88*   LYMPHO PCT % 8*   MONO PCT % 3*   EOS PCT % 0     Results from last 7 days   Lab Units 06/09/25  0437 06/06/25  0450 06/05/25 2000   SODIUM mmol/L 136   < > 133*   POTASSIUM mmol/L 4.6   < > 4.3   CHLORIDE mmol/L 101   < > 99   CO2 mmol/L 28   < > 29   BUN mg/dL 15   < > 19   CREATININE mg/dL 0.62   < >  0.66   ANION GAP mmol/L 7   < > 5   CALCIUM mg/dL 9.8   < > 9.4   ALBUMIN g/dL  --   --  3.9   TOTAL BILIRUBIN mg/dL  --   --  0.35   ALK PHOS U/L  --   --  63   ALT U/L  --   --  32   AST U/L  --   --  18   GLUCOSE RANDOM mg/dL 150*   < > 187*    < > = values in this interval not displayed.                 Results from last 7 days   Lab Units 06/05/25 2000   PROCALCITONIN ng/ml <0.05       Recent Cultures (last 7 days):               Last 24 Hours Medication List:     Current Facility-Administered Medications:     acetaminophen (TYLENOL) tablet 650 mg, Q4H PRN    amitriptyline (ELAVIL) tablet 50 mg, HS PRN    docusate sodium (COLACE) capsule 100 mg, BID    DULoxetine (CYMBALTA) delayed release capsule 60 mg, BID    enoxaparin (LOVENOX) subcutaneous injection 40 mg, Daily    fluticasone (FLONASE) 50 mcg/act nasal spray 2 spray, Daily    guaiFENesin (MUCINEX) 12 hr tablet 600 mg, BID    levalbuterol (XOPENEX) inhalation solution 1.25 mg, TID **AND** [DISCONTINUED] sodium chloride 0.9 % inhalation solution 3 mL, TID    loratadine (CLARITIN) tablet 10 mg, Daily PRN    methylPREDNISolone sodium succinate (Solu-MEDROL) injection 40 mg, Q12H ALONDRA    ondansetron (ZOFRAN) injection 4 mg, Q6H PRN    polyethylene glycol (MIRALAX) packet 17 g, Daily PRN    risperiDONE (RisperDAL) tablet 1 mg, QAM    risperiDONE (RisperDAL) tablet 4 mg, HS    senna (SENOKOT) tablet 8.6 mg, HS    umeclidinium-vilanterol 62.5-25 mcg/actuation inhaler 1 puff, Daily    Administrative Statements   Today, Patient Was Seen By: ASHLEY Villalta      **Please Note: This note may have been constructed using a voice recognition system.**

## 2025-06-09 NOTE — UTILIZATION REVIEW
Continued Stay Review    Date: 6/9/25                          Current Patient Class: Inpatient  Current Level of Care: MS    HPI:60 y.o. male initially admitted on 6/5/25 to observation and converted to inpatient on 6/7/25   Current Diagnosis: COPD exacerbation/acute on chronic respiratory failure with hypoxia.      Assessment/Plan:     6/9/2025 .  Patient presents with  improving shortness of breath  On exam:  bilateral expiratory wheezes.  On oxygen 2 liters.   Affect flat.   Abnormal labs or imaging:  wbc 13.08.  glucose 150  Plan  Continue Anoro, scheduled Xopenex nebs,  continued IV Steroids and possible transition to prednisone tomorrow.   Continue to titrate oxygen, maintaining O2 sats > 89%.     Medications:   Scheduled Medications:  docusate sodium, 100 mg, Oral, BID  DULoxetine, 60 mg, Oral, BID  enoxaparin, 40 mg, Subcutaneous, Daily  fluticasone, 2 spray, Each Nare, Daily  guaiFENesin, 600 mg, Oral, BID  levalbuterol, 1.25 mg, Nebulization, TID  methylPREDNISolone sodium succinate, 40 mg, Intravenous, Q12H ALONDRA  risperiDONE, 1 mg, Oral, QAM  risperiDONE, 4 mg, Oral, HS  senna, 1 tablet, Oral, HS  umeclidinium-vilanterol, 1 puff, Inhalation, Daily    Continuous IV Infusions:     PRN Meds: not used.   acetaminophen, 650 mg, Oral, Q4H PRN  amitriptyline, 50 mg, Oral, HS PRN  loratadine, 10 mg, Oral, Daily PRN  ondansetron, 4 mg, Intravenous, Q6H PRN  polyethylene glycol, 17 g, Oral, Daily PRN      Discharge Plan: to be determined.     Vital Signs (last 3 days)       Date/Time Temp Pulse Resp BP MAP (mmHg) SpO2 Calculated FIO2 (%) - Nasal Cannula Nasal Cannula O2 Flow Rate (L/min) O2 Device Patient Position - Orthostatic VS Kylie Coma Scale Score Pain    06/09/25 1420 -- -- -- -- -- 94 % -- -- -- -- -- --    06/09/25 0800 -- -- -- -- -- -- 28 2 L/min Nasal cannula -- 15 No Pain    06/09/25 0743 -- -- -- -- -- 93 % -- -- -- -- -- --    06/09/25 07:25:44 97.7 °F (36.5 °C) 108 20 100/75 83 89 % 28 2 L/min  Nasal cannula Lying -- --    06/09/25 0500 -- 94 -- -- -- 92 % -- -- -- -- -- --    06/09/25 04:32:18 98.2 °F (36.8 °C) 85 18 126/88 101 90 % 28 2 L/min Nasal cannula Lying -- --    06/09/25 0300 -- 79 -- -- -- 95 % -- -- -- -- -- --    06/09/25 0100 -- 96 -- -- -- 91 % -- -- -- -- -- --    06/08/25 2300 -- 101 -- -- -- 94 % -- -- -- -- -- --    06/08/25 2100 -- 120 -- -- -- 91 % -- -- -- -- 15 No Pain    06/08/25 20:31:40 98.9 °F (37.2 °C) 125 18 127/86 100 91 % 28 2 L/min Nasal cannula Lying -- --    06/08/25 1942 -- 121 -- -- -- 93 % 28 2 L/min Nasal cannula -- -- --    06/08/25 1900 -- 114 -- -- -- 92 % -- -- -- -- -- --    06/08/25 1700 -- 105 -- -- -- 92 % -- -- -- -- -- --    06/08/25 15:22:21 98.4 °F (36.9 °C) 127 -- 128/86 100 91 % -- -- -- -- -- --    06/08/25 1333 -- -- -- -- -- 91 % -- -- -- -- -- --    06/08/25 1332 -- -- -- -- -- 91 % 28 2 L/min Nasal cannula -- -- --    06/08/25 08:03:26 97.9 °F (36.6 °C) 107 -- 113/78 90 88 % -- -- -- -- -- --    06/08/25 0800 97.9 °F (36.6 °C) -- -- -- -- -- 28 2 L/min Nasal cannula -- 15 No Pain    06/08/25 0728 -- -- -- -- -- -- 28 2 L/min Nasal cannula -- -- --    06/08/25 05:29:21 97.9 °F (36.6 °C) 92 18 114/81 92 91 % 28 2 L/min Nasal cannula Lying -- --    06/08/25 0500 -- 83 -- -- -- 94 % -- -- -- -- -- --    06/08/25 0300 -- 88 -- -- -- 95 % -- -- -- -- -- --    06/08/25 0100 -- 100 -- -- -- 90 % -- -- -- -- -- --    06/07/25 2300 -- 112 -- -- -- 92 % -- -- -- -- -- --    06/07/25 21:23:48 98.4 °F (36.9 °C) 114 20 129/81 97 91 % 28 2 L/min Nasal cannula Lying 15 No Pain    06/07/25 2100 -- 113 -- -- -- 91 % -- -- -- -- -- --    06/07/25 2000 -- 123 20 -- -- 91 % 28 2 L/min Nasal cannula -- -- --    06/07/25 1900 -- 118 -- -- -- 92 % -- -- -- -- -- --    06/07/25 1700 -- 107 -- -- -- 92 % -- -- -- -- -- --    06/07/25 15:00:29 99 °F (37.2 °C) 98 -- 115/76 89 92 % -- -- -- -- -- --    06/07/25 1355 -- -- -- -- -- -- 28 2 L/min Nasal cannula -- -- --     06/07/25 0812 -- -- -- -- -- -- 28 2 L/min Nasal cannula -- -- --    06/07/25 0800 97.7 °F (36.5 °C) -- -- -- -- -- 28 2 L/min Nasal cannula -- 15 No Pain    06/07/25 07:37:53 97.7 °F (36.5 °C) 80 20 114/77 89 92 % -- -- -- -- -- --    06/07/25 0500 -- 78 -- -- -- 95 % -- -- -- -- -- --    06/07/25 03:30:42 98.1 °F (36.7 °C) 104 18 111/79 90 88 % 28 2 L/min Nasal cannula Lying -- --    06/07/25 0300 -- 90 -- -- -- 93 % -- -- -- -- -- --    06/07/25 0100 -- 94 -- -- -- 94 % -- -- -- -- -- --    06/06/25 2300 -- 108 -- -- -- 92 % -- -- -- -- -- --    06/06/25 21:03:55 98.2 °F (36.8 °C) 109 18 111/77 88 93 % 28 2 L/min Nasal cannula Lying 15 No Pain    06/06/25 2100 -- 104 -- -- -- 92 % -- -- -- -- -- --    06/06/25 1935 -- 115 -- -- -- 94 % 28 2 L/min Nasal cannula -- -- --    06/06/25 1900 -- 106 -- -- -- 94 % -- -- -- -- -- --    06/06/25 1700 -- 100 -- -- -- 95 % -- -- -- -- -- --    06/06/25 15:58:53 -- 115 -- 104/77 86 91 % -- -- -- Lying -- --    06/06/25 15:43:07 98 °F (36.7 °C) 111 18 98/76 83 93 % -- -- -- Lying -- --    06/06/25 1351 -- -- -- -- -- 93 % 28 2 L/min Nasal cannula -- -- --    06/06/25 1100 -- -- -- -- -- -- 28 2 L/min Nasal cannula -- 15 No Pain    06/06/25 0912 -- -- -- -- -- 94 % 28 2 L/min Nasal cannula -- -- --    06/06/25 07:55:46 98 °F (36.7 °C) 89 18 140/87 105 94 % -- -- -- -- -- --    06/06/25 04:51:22 98.1 °F (36.7 °C) 101 -- 136/85 102 93 % -- -- -- -- -- --    06/06/25 0423 -- -- -- -- -- -- 28 2 L/min Nasal cannula -- -- --          Weight (last 2 days)       None            Pertinent Labs/Diagnostic Results:   Radiology:  XR chest 1 view portable   ED Interpretation by Alberto Rene DO (06/05 2107)   No acute abnl, no ptx, effusion, infiltrate, no obvious rib fracture, no widened mediastinum.  Interpreted by me              Final Interpretation by Grupo De La Cruz MD (06/06 [id number])      No acute cardiopulmonary disease.            Workstation performed: [id number]            Cardiology:  ECG 12 lead   Final Result by Adalid Ch MD (06/06 2107)   Sinus tachycardia   Otherwise normal ECG   When compared with ECG of 04-Dec-2022 09:44,   Vent. rate has increased by  54 bpm   Confirmed by Adalid Ch ([id number]) on 6/6/2025 9:07:49 PM        GI:  No orders to display     Results from last 7 days   Lab Units 06/05/25 2000   SARS-COV-2  Negative     Results from last 7 days   Lab Units 06/09/25 0437 06/08/25  0807 06/07/25  0331 06/06/25 0450 06/05/25 2000   WBC Thousand/uL 13.08* 12.07* 13.38* 14.21* 13.10*   HEMOGLOBIN g/dL 13.5 13.5 12.9 13.1 14.6   HEMATOCRIT % 41.9 41.5 39.8 39.9 43.7   PLATELETS Thousands/uL 317 321 337 350 410*   TOTAL NEUT ABS Thousands/µL 11.55* 9.68*  --   --  11.19*     Results from last 7 days   Lab Units 06/09/25 0437 06/08/25 0807 06/07/25  0331 06/06/25 0450 06/05/25 2000   SODIUM mmol/L 136 134* 136 136 133*   POTASSIUM mmol/L 4.6 4.0 4.3 4.2 4.3   CHLORIDE mmol/L 101 101 103 102 99   CO2 mmol/L 28 28 26 29 29   ANION GAP mmol/L 7 5 7 5 5   BUN mg/dL 15 16 13 15 19   CREATININE mg/dL 0.62 0.64 0.56* 0.55* 0.66   EGFR ml/min/1.73sq m 108 107 113 114 105   CALCIUM mg/dL 9.8 9.3 8.9 8.9 9.4   MAGNESIUM mg/dL  --   --   --  2.3 2.1   PHOSPHORUS mg/dL  --   --   --  3.2  --      Results from last 7 days   Lab Units 06/05/25 2000   AST U/L 18   ALT U/L 32   ALK PHOS U/L 63   TOTAL PROTEIN g/dL 6.7   ALBUMIN g/dL 3.9   TOTAL BILIRUBIN mg/dL 0.35     Results from last 7 days   Lab Units 06/09/25 0437 06/08/25 0807 06/07/25 0331 06/06/25  0450 06/05/25 2000   GLUCOSE RANDOM mg/dL 150* 142* 133 120 187*     Results from last 7 days   Lab Units 06/05/25 2000   PH FRANCIS  7.389   PCO2 FRANCIS mm Hg 47.2   PO2 FRANCIS mm Hg 108.1*   HCO3 FRANCIS mmol/L 27.9   BASE EXC FRANCIS mmol/L 2.2   O2 CONTENT FRANCIS ml/dL 20.7   O2 HGB, VENOUS % 96.2*     Results from last 7 days   Lab Units 06/05/25 2000   HS TNI 0HR ng/L 3     Results from last 7 days   Lab Units  06/05/25 2000   D-DIMER QUANTITATIVE ug/ml FEU 0.29     Results from last 7 days   Lab Units 06/05/25 2000   PROCALCITONIN ng/ml <0.05     Results from last 7 days   Lab Units 06/05/25 2000   BNP pg/mL 26     Results from last 7 days   Lab Units 06/05/25 2000   INFLUENZA A PCR  Negative   INFLUENZA B PCR  Negative   RSV PCR  Negative       Network Utilization Review Department  ATTENTION: Please call with any questions or concerns to [phone] and carefully listen to the prompts so that you are directed to the right person. All voicemails are confidential.   For Discharge needs, contact Care Management DC Support Team at [phone] opt. [phone]  Send all requests for admission clinical reviews, approved or denied determinations and any other requests to dedicated fax number below belonging to the campus where the patient is receiving treatment. List of dedicated fax numbers for the Facilities:  FACILITY NAME UR FAX NUMBER   ADMISSION DENIALS (Administrative/Medical Necessity) [phone]   DISCHARGE SUPPORT TEAM (NETWORK) [phone]   PARENT CHILD HEALTH (Maternity/NICU/Pediatrics) [phone]   General acute hospital [phone]   [facility] [phone]   [facility] [phone]   [facility] [phone]   [facility] [phone]   [facility] [phone]   [facility] [phone]   [facility] [phone]   [facility] [phone]   [facility] [phone]   [facility] [phone]   [facility] [phone]

## 2025-06-09 NOTE — ASSESSMENT & PLAN NOTE
Per chart review, noted elevation of WBC count at 13.08, but likely not infectious and related to steroid use.  Continue to monitor leukocytosis.  Patient denying any fever or chills.  Continue to monitor off IV antibiotics.

## 2025-06-09 NOTE — ASSESSMENT & PLAN NOTE
Home Regimen:   Risperidone 1 mg, daily  Risperidone 4 mg, daily at bedtime     Continue home regimen.

## 2025-06-09 NOTE — ASSESSMENT & PLAN NOTE
Per chart review, patient requiring additional supplemental oxygenation, where baseline is 2 L via nasal cannula.    In ED, noted patient requiring 4 L via nasal cannula, but now improved.    Patient with acute on chronic respiratory failure   secondary to COPD exacerbation.    Continue to titrate oxygen, maintaining O2 sats > 89%.   Patient now stable on baseline 2 L via nasal cannula, but still with noted wheezing secondary to resolving COPD exacerbation.

## 2025-06-10 VITALS
DIASTOLIC BLOOD PRESSURE: 74 MMHG | WEIGHT: 105.6 LBS | SYSTOLIC BLOOD PRESSURE: 115 MMHG | HEIGHT: 63 IN | TEMPERATURE: 97.4 F | RESPIRATION RATE: 20 BRPM | BODY MASS INDEX: 18.71 KG/M2 | HEART RATE: 106 BPM | OXYGEN SATURATION: 89 %

## 2025-06-10 LAB
ANION GAP SERPL CALCULATED.3IONS-SCNC: 5 MMOL/L (ref 4–13)
BUN SERPL-MCNC: 16 MG/DL (ref 5–25)
CALCIUM SERPL-MCNC: 9.3 MG/DL (ref 8.4–10.2)
CHLORIDE SERPL-SCNC: 100 MMOL/L (ref 96–108)
CO2 SERPL-SCNC: 29 MMOL/L (ref 21–32)
CREAT SERPL-MCNC: 0.67 MG/DL (ref 0.6–1.3)
ERYTHROCYTE [DISTWIDTH] IN BLOOD BY AUTOMATED COUNT: 12.5 % (ref 11.6–15.1)
GFR SERPL CREATININE-BSD FRML MDRD: 105 ML/MIN/1.73SQ M
GLUCOSE SERPL-MCNC: 141 MG/DL (ref 65–140)
HCT VFR BLD AUTO: 42 % (ref 36.5–49.3)
HGB BLD-MCNC: 13.7 G/DL (ref 12–17)
MCH RBC QN AUTO: 30.9 PG (ref 26.8–34.3)
MCHC RBC AUTO-ENTMCNC: 32.6 G/DL (ref 31.4–37.4)
MCV RBC AUTO: 95 FL (ref 82–98)
PLATELET # BLD AUTO: 313 THOUSANDS/UL (ref 149–390)
PMV BLD AUTO: 8.7 FL (ref 8.9–12.7)
POTASSIUM SERPL-SCNC: 4.8 MMOL/L (ref 3.5–5.3)
RBC # BLD AUTO: 4.44 MILLION/UL (ref 3.88–5.62)
SODIUM SERPL-SCNC: 134 MMOL/L (ref 135–147)
WBC # BLD AUTO: 12.05 THOUSAND/UL (ref 4.31–10.16)

## 2025-06-10 PROCEDURE — 94640 AIRWAY INHALATION TREATMENT: CPT

## 2025-06-10 PROCEDURE — 94760 N-INVAS EAR/PLS OXIMETRY 1: CPT

## 2025-06-10 PROCEDURE — 85027 COMPLETE CBC AUTOMATED: CPT

## 2025-06-10 PROCEDURE — 99239 HOSP IP/OBS DSCHRG MGMT >30: CPT | Performed by: PHYSICIAN ASSISTANT

## 2025-06-10 PROCEDURE — 94761 N-INVAS EAR/PLS OXIMETRY MLT: CPT

## 2025-06-10 PROCEDURE — 80048 BASIC METABOLIC PNL TOTAL CA: CPT

## 2025-06-10 PROCEDURE — 99232 SBSQ HOSP IP/OBS MODERATE 35: CPT | Performed by: INTERNAL MEDICINE

## 2025-06-10 RX ORDER — PREDNISONE 10 MG/1
TABLET ORAL
Qty: 30 TABLET | Refills: 0 | Status: SHIPPED | OUTPATIENT
Start: 2025-06-11 | End: 2025-06-23

## 2025-06-10 RX ORDER — PREDNISONE 20 MG/1
40 TABLET ORAL DAILY
Status: DISCONTINUED | OUTPATIENT
Start: 2025-06-11 | End: 2025-06-10 | Stop reason: HOSPADM

## 2025-06-10 RX ORDER — PANTOPRAZOLE SODIUM 40 MG/1
40 TABLET, DELAYED RELEASE ORAL DAILY
Qty: 30 TABLET | Refills: 0 | Status: SHIPPED | OUTPATIENT
Start: 2025-06-10

## 2025-06-10 RX ORDER — PREDNISONE 10 MG/1
10 TABLET ORAL DAILY
Status: DISCONTINUED | OUTPATIENT
Start: 2025-06-20 | End: 2025-06-10 | Stop reason: HOSPADM

## 2025-06-10 RX ORDER — PREDNISONE 20 MG/1
20 TABLET ORAL DAILY
Status: DISCONTINUED | OUTPATIENT
Start: 2025-06-17 | End: 2025-06-10 | Stop reason: HOSPADM

## 2025-06-10 RX ORDER — PANTOPRAZOLE SODIUM 40 MG/1
40 TABLET, DELAYED RELEASE ORAL
Qty: 30 TABLET | Refills: 0 | Status: SHIPPED | OUTPATIENT
Start: 2025-06-11 | End: 2025-06-10

## 2025-06-10 RX ORDER — LEVALBUTEROL TARTRATE 45 UG/1
1-2 AEROSOL, METERED ORAL EVERY 4 HOURS PRN
Qty: 15 G | Refills: 0 | Status: SHIPPED | OUTPATIENT
Start: 2025-06-10

## 2025-06-10 RX ORDER — GUAIFENESIN 600 MG/1
600 TABLET, EXTENDED RELEASE ORAL 2 TIMES DAILY
Qty: 20 TABLET | Refills: 0 | Status: SHIPPED | OUTPATIENT
Start: 2025-06-10 | End: 2025-06-20 | Stop reason: SDUPTHER

## 2025-06-10 RX ADMIN — FLUTICASONE PROPIONATE 2 SPRAY: 50 SPRAY, METERED NASAL at 08:00

## 2025-06-10 RX ADMIN — RISPERIDONE 1 MG: 0.5 TABLET, FILM COATED ORAL at 08:00

## 2025-06-10 RX ADMIN — METHYLPREDNISOLONE SODIUM SUCCINATE 40 MG: 40 INJECTION, POWDER, FOR SOLUTION INTRAMUSCULAR; INTRAVENOUS at 08:00

## 2025-06-10 RX ADMIN — UMECLIDINIUM BROMIDE AND VILANTEROL TRIFENATATE 1 PUFF: 62.5; 25 POWDER RESPIRATORY (INHALATION) at 08:00

## 2025-06-10 RX ADMIN — LEVALBUTEROL HYDROCHLORIDE 1.25 MG: 1.25 SOLUTION RESPIRATORY (INHALATION) at 07:39

## 2025-06-10 RX ADMIN — DULOXETINE HYDROCHLORIDE 60 MG: 30 CAPSULE, DELAYED RELEASE ORAL at 08:00

## 2025-06-10 RX ADMIN — GUAIFENESIN 600 MG: 600 TABLET ORAL at 08:00

## 2025-06-10 RX ADMIN — ENOXAPARIN SODIUM 40 MG: 40 INJECTION SUBCUTANEOUS at 08:00

## 2025-06-10 RX ADMIN — DOCUSATE SODIUM 100 MG: 100 CAPSULE, LIQUID FILLED ORAL at 08:00

## 2025-06-10 RX ADMIN — PANTOPRAZOLE SODIUM 40 MG: 40 TABLET, DELAYED RELEASE ORAL at 06:11

## 2025-06-10 NOTE — PLAN OF CARE
Problem: PAIN - ADULT  Goal: Verbalizes/displays adequate comfort level or baseline comfort level  Description: Interventions:  - Encourage patient to monitor pain and request assistance  - Assess pain using appropriate pain scale  - Administer analgesics as ordered based on type and severity of pain and evaluate response  - Implement non-pharmacological measures as appropriate and evaluate response  - Consider cultural and social influences on pain and pain management  - Notify physician/advanced practitioner if interventions unsuccessful or patient reports new pain  - Educate patient/family on pain management process including their role and importance of  reporting pain   - Provide non-pharmacologic/complimentary pain relief interventions  Outcome: Progressing     Problem: INFECTION - ADULT  Goal: Absence or prevention of progression during hospitalization  Description: INTERVENTIONS:  - Assess and monitor for signs and symptoms of infection  - Monitor lab/diagnostic results  - Monitor all insertion sites, i.e. indwelling lines, tubes, and drains  - Monitor endotracheal if appropriate and nasal secretions for changes in amount and color  - [place] appropriate cooling/warming therapies per order  - Administer medications as ordered  - Instruct and encourage patient and family to use good hand hygiene technique  - Identify and instruct in appropriate isolation precautions for identified infection/condition  Outcome: Progressing  Goal: Absence of fever/infection during neutropenic period  Description: INTERVENTIONS:  - Monitor WBC  - Perform strict hand hygiene  - Limit to healthy visitors only  - No plants, dried, fresh or silk flowers with rice in patient room  Outcome: Progressing     Problem: DISCHARGE PLANNING  Goal: Discharge to home or other facility with appropriate resources  Description: INTERVENTIONS:  - Identify barriers to discharge w/patient and caregiver  - Arrange for needed discharge resources  and transportation as appropriate  - Identify discharge learning needs (meds, wound care, etc.)  - Arrange for interpretive services to assist at discharge as needed  - Refer to Case Management Department for coordinating discharge planning if the patient needs post-hospital services based on physician/advanced practitioner order or complex needs related to functional status, cognitive ability, or social support system  Outcome: Progressing     Problem: Knowledge Deficit  Goal: Patient/family/caregiver demonstrates understanding of disease process, treatment plan, medications, and discharge instructions  Description: Complete learning assessment and assess knowledge base.  Interventions:  - Provide teaching at level of understanding  - Provide teaching via preferred learning methods  Outcome: Progressing     Problem: RESPIRATORY - ADULT  Goal: Achieves optimal ventilation and oxygenation  Description: INTERVENTIONS:  - Assess for changes in respiratory status  - Assess for changes in mentation and behavior  - Position to facilitate oxygenation and minimize respiratory effort  - Oxygen administered by appropriate delivery if ordered  - Initiate smoking cessation education as indicated  - Encourage broncho-pulmonary hygiene including cough, deep breathe, Incentive Spirometry  - Assess the need for suctioning and aspirate as needed  - Assess and instruct to report SOB or any respiratory difficulty  - Respiratory Therapy support as indicated  Outcome: Progressing

## 2025-06-10 NOTE — ASSESSMENT & PLAN NOTE
-4/17/2023-FEV1 53%  -Day # 1/3-prednisone 40 mg decrease by 10 mg every 3 days  -Recommend discharge on triple therapy: Breztri 160/9/4.5 MCG 2 puffs twice daily, Xopenex nebulizer every 6 as needed, Xopenex HFA 2 puffs every 6 as needed  -Patient has nebulizer at home  -Patient becomes tachycardic with albuterol  -Completed 3 days of azithromycin  -Will need close pulmonary follow-up May consider pulmonary rehab

## 2025-06-10 NOTE — RESPIRATORY THERAPY NOTE
Home Oxygen Qualifying Test     Patient name: Bill Haji        : 1965   Date of Test:  Jaye 10, 2025  Diagnosis:    Home Oxygen Test:    **Medicare Guidelines require item(s) 1-5 on all ambulatory patients or 1 and 2 on non-ambulatory patients.    1. Baseline SPO2 on Room Air at rest 91 %   If <= 88% on Room Air add O2 via NC to obtain SpO2 >=88%. If LPM needed, document LPM  needed to reach =>88%    SPO2 during exertion on Room Air 91  %  During exertion monitor SPO2. If SPO2 increases >=89%, do not add supplemental oxygen    SPO2 on Oxygen at Rest  % at  LPM    SPO2 during exertion on Oxygen  % at  LPM    Test performed during exertion activity.      []  Supplemental Home Oxygen is indicated.    [x]  Client does not qualify for home oxygen.    Respiratory Additional Notes-     Khari Urbina, RT

## 2025-06-10 NOTE — CASE MANAGEMENT
Case Management Discharge Planning Note    Patient name Bill Haji  Location /-01 MRN [id number]  : 1965 Date 6/10/2025       Current Admission Date: 2025  Current Admission Diagnosis:Moderate COPD with acute exacerbation (HCC)   Patient Active Problem List    Diagnosis Date Noted    Leukocytosis 2025    Acute on chronic respiratory failure with hypoxia (HCC) 2025    Psoriasis 10/25/2024    Tobacco use disorder, mild, abuse 2023    Trauma and stressor-related disorder 2023    Schizophrenia (HCC) 2023    Generalized anxiety disorder 2023    SOB (shortness of breath)     Depression 2022    Dependence on nicotine from cigarettes 2022    Osteoporosis 2020    Spinal stenosis of cervical region 2020    Moderate COPD with acute exacerbation (HCC) 2015      LOS (days): 3  Geometric Mean LOS (GMLOS) (days):   Days to GMLOS:     OBJECTIVE:  Risk of Unplanned Readmission Score: 11.84         Current admission status: Inpatient   Preferred Pharmacy:   [id number] [place] - [address] [facility]  [address] [address]  Phone: [phone] Fax: [phone]    Primary Care Provider: Maria T Turner DO    Primary Insurance: [facility]  Secondary Insurance:     DISCHARGE DETAILS:                                          Other Referral/Resources/Interventions Provided:  Interventions: [facility]  Referral Comments: Faxed AVS/DC summary to [facility]. Per Brianna , MSW has been added to pt's existing referral.         Treatment Team Recommendation: Home with Home Health Care  Expected Discharge Disposition: Home Health Services

## 2025-06-10 NOTE — ASSESSMENT & PLAN NOTE
-Patient quit smoking tobacco in March 2025  - Active vaping  -NRT per primary team  -Will qualify for annual screenings      -Pulmonary will sign off   - Outpatient follow-up per discharge instructions

## 2025-06-10 NOTE — NURSING NOTE
I removed the IV and Masimo from the patient. I went over the after visit summary documents with the patient and he had no questions for me. I did a clean sweep of the room and all the belongings are with the patient. The patient was transported out of the hospital with the use of a wheelchair.

## 2025-06-10 NOTE — PROGRESS NOTES
"Progress Note - Pulmonology   Name: Bill Haji 60 y.o. male I MRN: [id number]  Unit/Bed#: -01 I Date of Admission: 6/5/2025   Date of Service: 6/10/2025 I Hospital Day: 3     Assessment & Plan  Moderate COPD with acute exacerbation (HCC)  -4/17/2023-FEV1 53%  -Day # 1/3-prednisone 40 mg decrease by 10 mg every 3 days  -Recommend discharge on triple therapy: Breztri 160/9/4.5 MCG 2 puffs twice daily, Xopenex nebulizer every 6 as needed, Xopenex HFA 2 puffs every 6 as needed  -Patient has nebulizer at home  -Patient becomes tachycardic with albuterol  -Completed 3 days of azithromycin  -Will need close pulmonary follow-up May consider pulmonary rehab  Acute on chronic respiratory failure with hypoxia (HCC)  -Currently on 3 L 93%, does not wear home O2  - Continue sats greater than 88%  - Pulmonary toileting: Deep breathing cough out of bed as tolerated incentive spirometry  - Will need ambulatory pulse ox prior to discharge    Dependence on nicotine from cigarettes  -Patient quit smoking tobacco in March 2025  - Active vaping  -NRT per primary team  -Will qualify for annual screenings      -Pulmonary will sign off   - Outpatient follow-up per discharge instructions      24 Hour Events : na  Subjective : \"I feel better\"    Objective :  Temp:  [97.4 °F (36.3 °C)-98.5 °F (36.9 °C)] 97.4 °F (36.3 °C)  HR:  [] 106  BP: ()/(70-86) 115/74  Resp:  [20] 20  SpO2:  [89 %-94 %] 89 %  O2 Device: Nasal cannula  Nasal Cannula O2 Flow Rate (L/min):  [2 L/min-3 L/min] 3 L/min    Physical Exam  Constitutional:       General: He is not in acute distress.     Appearance: Normal appearance. He is normal weight.   HENT:      Head: Normocephalic and atraumatic.      Nose: Nose normal. No congestion or rhinorrhea.      Mouth/Throat:      Mouth: Mucous membranes are dry.      Pharynx: Oropharynx is clear. No oropharyngeal exudate or posterior oropharyngeal erythema.     Cardiovascular:      Rate and Rhythm: Normal rate " and regular rhythm.      Pulses: Normal pulses.      Heart sounds: Normal heart sounds. No murmur heard.     No friction rub. No gallop.   Pulmonary:      Effort: Pulmonary effort is normal. No respiratory distress.      Breath sounds: No stridor. No wheezing, rhonchi or rales.      Comments: Diminished aeration  Chest:      Chest wall: No tenderness.   Abdominal:      General: Abdomen is flat. Bowel sounds are normal. There is no distension.      Palpations: Abdomen is soft. There is no mass.     Musculoskeletal:         General: No swelling or tenderness. Normal range of motion.      Cervical back: Normal range of motion. No rigidity or tenderness.     Skin:     General: Skin is warm and dry.      Coloration: Skin is not jaundiced or pale.     Neurological:      General: No focal deficit present.      Mental Status: He is alert and oriented to person, place, and time. Mental status is at baseline.     Psychiatric:         Mood and Affect: Mood normal.         Behavior: Behavior normal.           Lab Results: I have reviewed the following results:   .     06/10/25  0347   WBC 12.05*   HGB 13.7   HCT 42.0      SODIUM 134*   K 4.8      CO2 29   BUN 16   CREATININE 0.67   GLUC 141*     ABG: No new results in last 24 hours.    Imaging Results Review: I personally reviewed the following image studies/reports in PACS and discussed pertinent findings with Radiology: chest xray. My interpretation of the radiology images/reports is:  .  Other Study Results Review: EKG was reviewed.   PFT Results Reviewed: reviewed

## 2025-06-10 NOTE — ASSESSMENT & PLAN NOTE
-Currently on 3 L 93%, does not wear home O2  - Continue sats greater than 88%  - Pulmonary toileting: Deep breathing cough out of bed as tolerated incentive spirometry  - Will need ambulatory pulse ox prior to discharge

## 2025-06-10 NOTE — PLAN OF CARE
Problem: PAIN - ADULT  Goal: Verbalizes/displays adequate comfort level or baseline comfort level  Description: Interventions:  - Encourage patient to monitor pain and request assistance  - Assess pain using appropriate pain scale  - Administer analgesics as ordered based on type and severity of pain and evaluate response  - Implement non-pharmacological measures as appropriate and evaluate response  - Consider cultural and social influences on pain and pain management  - Notify physician/advanced practitioner if interventions unsuccessful or patient reports new pain  - Educate patient/family on pain management process including their role and importance of  reporting pain   - Provide non-pharmacologic/complimentary pain relief interventions  Outcome: Progressing     Problem: INFECTION - ADULT  Goal: Absence or prevention of progression during hospitalization  Description: INTERVENTIONS:  - Assess and monitor for signs and symptoms of infection  - Monitor lab/diagnostic results  - Monitor all insertion sites, i.e. indwelling lines, tubes, and drains  - Monitor endotracheal if appropriate and nasal secretions for changes in amount and color  - [place] appropriate cooling/warming therapies per order  - Administer medications as ordered  - Instruct and encourage patient and family to use good hand hygiene technique  - Identify and instruct in appropriate isolation precautions for identified infection/condition  Outcome: Progressing     Problem: DISCHARGE PLANNING  Goal: Discharge to home or other facility with appropriate resources  Description: INTERVENTIONS:  - Identify barriers to discharge w/patient and caregiver  - Arrange for needed discharge resources and transportation as appropriate  - Identify discharge learning needs (meds, wound care, etc.)  - Arrange for interpretive services to assist at discharge as needed  - Refer to Case Management Department for coordinating discharge planning if the patient needs  post-hospital services based on physician/advanced practitioner order or complex needs related to functional status, cognitive ability, or social support system  Outcome: Progressing     Problem: Knowledge Deficit  Goal: Patient/family/caregiver demonstrates understanding of disease process, treatment plan, medications, and discharge instructions  Description: Complete learning assessment and assess knowledge base.  Interventions:  - Provide teaching at level of understanding  - Provide teaching via preferred learning methods  Outcome: Progressing     Problem: Knowledge Deficit  Goal: Patient/family/caregiver demonstrates understanding of disease process, treatment plan, medications, and discharge instructions  Description: Complete learning assessment and assess knowledge base.  Interventions:  - Provide teaching at level of understanding  - Provide teaching via preferred learning methods  Outcome: Progressing     Problem: RESPIRATORY - ADULT  Goal: Achieves optimal ventilation and oxygenation  Description: INTERVENTIONS:  - Assess for changes in respiratory status  - Assess for changes in mentation and behavior  - Position to facilitate oxygenation and minimize respiratory effort  - Oxygen administered by appropriate delivery if ordered  - Initiate smoking cessation education as indicated  - Encourage broncho-pulmonary hygiene including cough, deep breathe, Incentive Spirometry  - Assess the need for suctioning and aspirate as needed  - Assess and instruct to report SOB or any respiratory difficulty  - Respiratory Therapy support as indicated  Outcome: Progressing

## 2025-06-11 NOTE — UTILIZATION REVIEW
NOTIFICATION OF ADMISSION DISCHARGE   This is a Notification of Discharge from [facility]. Please be advised that this patient has been discharge from our facility. Below you will find the admission and discharge date and time including the patient’s disposition.   UTILIZATION REVIEW CONTACT:  Utilization Review Assistants  Network Utilization Review Department  Phone: [phone] x carefully listen to the prompts. All voicemails are confidential.  Email: [email or web address]     ADMISSION INFORMATION  PRESENTATION DATE: 6/5/2025  7:39 PM  OBERVATION ADMISSION DATE: 06/05/2025 2230  INPATIENT ADMISSION DATE: 6/7/25  1:01 PM   DISCHARGE DATE: 6/10/2025  4:02 PM   DISPOSITION:Home with Home Health Care    Network Utilization Review Department  ATTENTION: Please call with any questions or concerns to [phone] and carefully listen to the prompts so that you are directed to the right person. All voicemails are confidential.   For Discharge needs, contact Care Management DC Support Team at [phone] opt. 2  Send all requests for admission clinical reviews, approved or denied determinations and any other requests to dedicated fax number below belonging to the campus where the patient is receiving treatment. List of dedicated fax numbers for the Facilities:  FACILITY NAME UR FAX NUMBER   ADMISSION DENIALS (Administrative/Medical Necessity) [phone]   DISCHARGE SUPPORT TEAM ([facility]) [phone]   PARENT CHILD HEALTH (Maternity/NICU/Pediatrics) [phone]   [facility] [phone]   [facility] [phone]   [facility] [phone]   [facility] [phone]   [facility] [phone]   [facility] [phone]   [facility] [phone]   [facility]  [phone]   [facility] [phone]   [facility] [phone]   [facility] [phone]   [facility] [phone]

## 2025-06-16 ENCOUNTER — PATIENT OUTREACH (OUTPATIENT)
Dept: CASE MANAGEMENT | Facility: OTHER | Age: 60
End: 2025-06-16

## 2025-06-16 DIAGNOSIS — Z71.89 COORDINATION OF COMPLEX CARE: ICD-10-CM

## 2025-06-16 DIAGNOSIS — J44.1 COPD WITH ACUTE EXACERBATION (HCC): Primary | ICD-10-CM

## 2025-06-16 NOTE — PROGRESS NOTES
OP RT CM received incoming referral from JOE Chandra regarding patient.   .OP RT CM called and spoke with patient regarding their breathing, medications and O2.  Bill stated he doesn't use the O2. His SpO2 92% on room air while we were talking. We discussed the lower limits and use of O2. I encouraged to wear while sleeping and with exertion. Bill did not receive COPD booklet/zone tools. Will send to his home.   He has an  upcoming pulmonary appt. On Friday 6/20/25 and we confirmed. He has a ride. He doesn't drive but has a  taking him. Bill is a non smoker.   He uses Bevespi BID and rinses out mouth and Xopenex inhaler QID. He stated he is allergic to Albuterol. He has a nebulizer but rarely uses. He denies fever/chills and stated he is raising clear sputum easily.    OP RT CM will outreach next week and Bill accepted my contact information.

## 2025-06-20 ENCOUNTER — TELEPHONE (OUTPATIENT)
Age: 60
End: 2025-06-20

## 2025-06-20 ENCOUNTER — OFFICE VISIT (OUTPATIENT)
Age: 60
End: 2025-06-20
Payer: MEDICARE

## 2025-06-20 VITALS
TEMPERATURE: 98.4 F | DIASTOLIC BLOOD PRESSURE: 66 MMHG | HEIGHT: 63 IN | WEIGHT: 107 LBS | HEART RATE: 111 BPM | BODY MASS INDEX: 18.96 KG/M2 | OXYGEN SATURATION: 94 % | SYSTOLIC BLOOD PRESSURE: 124 MMHG

## 2025-06-20 DIAGNOSIS — F17.211 CIGARETTE NICOTINE DEPENDENCE IN REMISSION: ICD-10-CM

## 2025-06-20 DIAGNOSIS — J44.9 COPD, MODERATE (HCC): Primary | ICD-10-CM

## 2025-06-20 DIAGNOSIS — J44.1 COPD WITH ACUTE EXACERBATION (HCC): ICD-10-CM

## 2025-06-20 PROCEDURE — 99213 OFFICE O/P EST LOW 20 MIN: CPT

## 2025-06-20 RX ORDER — GUAIFENESIN 600 MG/1
600 TABLET, EXTENDED RELEASE ORAL 2 TIMES DAILY
Qty: 60 TABLET | Refills: 1 | Status: SHIPPED | OUTPATIENT
Start: 2025-06-20

## 2025-06-20 RX ORDER — OMEPRAZOLE 20 MG/1
1 CAPSULE, DELAYED RELEASE ORAL DAILY
COMMUNITY
Start: 2025-04-26

## 2025-06-20 RX ORDER — CHOLECALCIFEROL (VITAMIN D3) 50 MCG
2000 TABLET ORAL DAILY
COMMUNITY

## 2025-06-20 NOTE — ASSESSMENT & PLAN NOTE
Former smoker, quit smoking March 2025. He has 37 pack year smoking history.  I congratulated him and encouraged continued smoking cessation  I discussed with him that he is a candidate for lung cancer CT screening.   The following Shared Decision-Making points were covered:  Benefits of screening were discussed, including the rates of reduction in death from lung cancer and other causes.  Harms of screening were reviewed, including false positive tests, radiation exposure levels, risks of invasive procedures, risks of complications of screening, and risk of overdiagnosis.  I counseled on the importance of adherence to annual lung cancer LDCT screening, impact of co-morbidities, and ability or willingness to undergo diagnosis and treatment.  I counseled on the importance of maintaining abstinence as a former smoker or was counseled on the importance of smoking cessation if a current smoker  Review of Eligibility Criteria: He meets all of the criteria for Lung Cancer Screening.   He is 60 y.o.   He has 20 pack year tobacco history and is a current smoker or has quit within the past 15 years  He presents no signs or symptoms of lung cancer  After discussion, the patient decided to elect lung cancer screening - Due August 2025  Orders:    CT Lung Screening Program; Future

## 2025-06-20 NOTE — PROGRESS NOTES
Follow-up  Visit - Pulmonary Medicine   Name: Bill Haji      : 1965      MRN: [id number]  Encounter Provider: ASHLEY Gonzáles  Encounter Date: 2025   Encounter department: [facility]  :  Assessment & Plan  COPD, moderate (HCC)  Previously followed with [facility] pulmonology. Would like to establish with [facility].   Reviewed most recent PFTs with FEV1 53%  Continue Bevespi 2 puffs twice daily. Has tried Trelegy and Breztri in the past and developed thrush with both  Continue xopenex inhaler/nebulizer q6h prn. He notes tachycardia with albuterol. Encouraged to increase nebulizer use as needed for chest congestion  Continue prednisone taper through completion  Continue mucinex BID for assistance with expectoration       Cigarette nicotine dependence in remission  Former smoker, quit smoking 2025. He has 37 pack year smoking history.  I congratulated him and encouraged continued smoking cessation  I discussed with him that he is a candidate for lung cancer CT screening.   The following Shared Decision-Making points were covered:  Benefits of screening were discussed, including the rates of reduction in death from lung cancer and other causes.  Harms of screening were reviewed, including false positive tests, radiation exposure levels, risks of invasive procedures, risks of complications of screening, and risk of overdiagnosis.  I counseled on the importance of adherence to annual lung cancer LDCT screening, impact of co-morbidities, and ability or willingness to undergo diagnosis and treatment.  I counseled on the importance of maintaining abstinence as a former smoker or was counseled on the importance of smoking cessation if a current smoker  Review of Eligibility Criteria: He meets all of the criteria for Lung Cancer Screening.   He is 60 y.o.   He has 20 pack year tobacco history and is a current smoker or has quit within the past 15 years  He presents no  signs or symptoms of lung cancer  After discussion, the patient decided to elect lung cancer screening - Due August 2025  Orders:    CT Lung Screening Program; Future    COPD with acute exacerbation (HCC)  Plan as above   Orders:    Ambulatory Referral to Pulmonology    guaiFENesin (MUCINEX) 600 mg 12 hr tablet; Take 1 tablet (600 mg total) by mouth 2 (two) times a day      Return in about 5 months (around 11/20/2025) for Next scheduled follow up.    History of Present Illness   Bill Haji is a 60 y.o. male who presents for hospital follow up. He was admitted 6/5/25 - 6/10/25 with acute COPD exacerbation. He was treated with steroids, oxygen, and azithromycin. Discharged home on prednisone taper and did not require o2 upon discharge.    Patient states he is feeling significantly improved since discharge from the hospital. He denies any significant shortness of breath, chest pain or wheezing. Occasional productive cough bringing up clear sputum. Notes some PND/nasal congestion, which is mostly relieved from mucinex. He is compliant with using his Bevespi inhaler 2 puffs twice daily. Has xopenex inhaler which he uses a varying amount - several times a week to several times a day. He has a nebulizer at home, but rarely uses it.     He is a former smoker. He quit 3 months ago. He has 37 pack year smoking history. He lives at home with his brother & wife.    His brother accompanied him to the visit today, is present during its entirety and offers large portions of history.    Review of Systems   Constitutional:  Negative for chills and fever.   HENT:  Positive for congestion. Negative for sore throat and trouble swallowing.    Respiratory:  Positive for cough. Negative for chest tightness, shortness of breath and wheezing.    Cardiovascular:  Negative for chest pain.   Musculoskeletal:  Negative for arthralgias and back pain.   Neurological:  Negative for dizziness and syncope.   All other systems reviewed and are  "negative.      Aside from what is mentioned in the HPI, ROS is otherwise negative    Medical History Reviewed by provider this encounter:  Tobacco  Allergies  Meds  Problems  Med Hx  Surg Hx  Fam Hx     .  Medications Ordered Prior to Encounter[1]   Social History[2]     Medical History Reviewed by provider this encounter:  Tobacco  Allergies  Meds  Problems  Med Hx  Surg Hx  Fam Hx     .    Objective   /66   Pulse (!) 111   Temp 98.4 °F (36.9 °C) (Tympanic)   Ht 5' 3\" (1.6 m)   Wt 48.5 kg (107 lb)   SpO2 94%   BMI 18.95 kg/m²     Physical Exam  Vitals and nursing note reviewed.   Constitutional:       General: He is not in acute distress.     Appearance: He is well-developed.   HENT:      Head: Normocephalic and atraumatic.      Nose: Nose normal.     Eyes:      Conjunctiva/sclera: Conjunctivae normal.       Cardiovascular:      Rate and Rhythm: Normal rate.   Pulmonary:      Effort: Pulmonary effort is normal. No respiratory distress.      Breath sounds: Wheezing (mild scattered) and rhonchi present.     Musculoskeletal:         General: No swelling. Normal range of motion.      Cervical back: Normal range of motion and neck supple.     Skin:     General: Skin is warm and dry.     Neurological:      Mental Status: He is alert and oriented to person, place, and time.     Psychiatric:         Mood and Affect: Mood normal.           Diagnostic Data:  Labs: I personally reviewed the most recent laboratory data pertinent to today's visit.      Radiology results:  Radiology Results Review: I have reviewed radiology reports from 6/5/25 including: chest xray.  Clear lungs with background emphysematous disease noted. No pneumothorax or pleural effusion.      PFT/spirometry results, 4/17/23:  Spirometry:  FEV1/FVC Ratio is 49%. FEV1 is 1.81 L/53% of predicted. FVC is 3.71 L/86% of predicted.  No significant bronchodilator response     Lung volumes:  RV 3.71 L/183% of predicted, TLC 7.64 L/119% of " predicted, RV/TLC ratio 49%     Diffusing capacity:  50 % of predicted     IMPRESSION:  Moderately severe obstructive airflow imitation on spirometry  No significant but near significant bronchodilator response  Normal total lung capacity but increased residual volume indicating air trapping  Mildly impaired diffusion capacity  Obstructive flow-volume loop      Danyell Almonte, MSN RN FNP-BC  Nurse Practitioner  [facility]           [1]   Current Outpatient Medications on File Prior to Visit   Medication Sig Dispense Refill    alendronate (FOSAMAX) 70 mg tablet Take 70 mg by mouth once a week      amitriptyline (ELAVIL) 50 mg tablet Amitriptyline 50m tablet po at night PRN 90 tablet 1    Bevespi Aerosphere 9-4.8 MCG/ACT inhaler Inhale 2 puffs in the morning and 2 puffs in the evening.      Cholecalciferol 50 MCG ( UT) TABS Take 2,000 Units by mouth daily      DULoxetine (CYMBALTA) 60 mg delayed release capsule Take 1 capsule (60 mg total) by mouth 2 (two) times a day Duloxetine 60m capsule po twice daily 180 capsule 1    fluticasone (FLONASE) 50 mcg/act nasal spray 2 sprays      levalbuterol (Xopenex HFA) 45 mcg/act inhaler Inhale 1-2 puffs every 4 (four) hours as needed for wheezing 15 g 0    loratadine (CLARITIN) 10 mg tablet Take 10 mg by mouth daily as needed      omeprazole (PriLOSEC) 20 mg delayed release capsule Take 1 capsule by mouth in the morning      predniSONE 10 mg tablet Take 4 tablets (40 mg total) by mouth daily for 3 days, THEN 3 tablets (30 mg total) daily for 3 days, THEN 2 tablets (20 mg total) daily for 3 days, THEN 1 tablet (10 mg total) daily for 3 days. Do not start before 2025. 30 tablet 0    risperiDONE (RisperDAL) 1 mg tablet Risperidone 1 mg : 1 tablet in the morning 90 tablet 1    risperiDONE (RisperDAL) 4 mg tablet Risperidone 4m tablet po night 90 tablet 1    Stelara 45 MG/0.5ML SOSY subcutaneous injection       pantoprazole  (PROTONIX) 40 mg tablet Take 1 tablet (40 mg total) by mouth daily (Patient not taking: Reported on 6/20/2025) 30 tablet 0    [DISCONTINUED] guaiFENesin (MUCINEX) 600 mg 12 hr tablet Take 1 tablet (600 mg total) by mouth 2 (two) times a day (Patient not taking: Reported on 6/20/2025) 20 tablet 0     No current facility-administered medications on file prior to visit.   [2]   Social History  Tobacco Use    Smoking status: Former     Types: Cigars    Smokeless tobacco: Never    Tobacco comments:     06/14/24 Bill Haji , smoke 6-7 cigars daily.    Vaping Use    Vaping status: Never Used   Substance and Sexual Activity    Alcohol use: Yes     Comment: rarely    Drug use: Yes     Types: Marijuana

## 2025-06-20 NOTE — TELEPHONE ENCOUNTER
Called Bill and offered him an earlier appt at 230pm with Danyell Almonte, he currently has a 4pm. Stated if he is unable to come at 230 his 4 appt will stand. Requested a CB to confirm or deny early appt time.    Called Pt again, with an offer of 230 or 3pm appt (current 3pm is admitted) today. Left office contact information to CB.

## 2025-06-20 NOTE — ASSESSMENT & PLAN NOTE
Plan as above   Orders:    Ambulatory Referral to Pulmonology    guaiFENesin (MUCINEX) 600 mg 12 hr tablet; Take 1 tablet (600 mg total) by mouth 2 (two) times a day

## 2025-06-23 ENCOUNTER — PATIENT OUTREACH (OUTPATIENT)
Dept: CASE MANAGEMENT | Facility: OTHER | Age: 60
End: 2025-06-23

## 2025-06-23 NOTE — PROGRESS NOTES
.OP RT CM called and spoke with patient regarding his breathing, medications and O2.  Bill stated he is feeling well. Using Bevespi inhaler BID and rinsing out mouth.   He also used his Xopenex inhaler QID.   He is wearing 3lpm nasal cannula on and off throughout the day. He understands the SpO2 limits. SpO2 92% on room air.     Bill is raising clear sputum and denies fever/chills. He requested the number for pulmonary rehab. I gave him the number and he is scheduled to start on 6/26/25.     OP RT CM will outreach in two weeks and Bill has my contact information.

## 2025-06-26 ENCOUNTER — CLINICAL SUPPORT (OUTPATIENT)
Dept: PULMONOLOGY | Facility: HOSPITAL | Age: 60
End: 2025-06-26
Payer: MEDICARE

## 2025-06-26 DIAGNOSIS — J44.1 COPD WITH ACUTE EXACERBATION (HCC): Primary | ICD-10-CM

## 2025-06-26 PROCEDURE — 94625 PHY/QHP OP PULM RHB W/O MNTR: CPT

## 2025-06-26 NOTE — PROGRESS NOTES
Pulmonary Rehabilitation   Assessment and Individualized Treatment Plan  INITIAL       Today's date: 2025  # of Exercise Sessions Completed: 1-evaluation  Patient name: Bill Haji     : 1965       MRN: [id number]  Referring Physician: Dr. Haile  Pulmonologist: Alice Lambert  Provider: Main  Clinician: Paris Ray MS, CEP      Dx:    Encounter Diagnosis   Name Primary?    COPD with acute exacerbation (HCC)      Date of onset: 6/10/2025      Treatment is tailored to this patient's individual needs.  The ITP was reviewed with the patient and all questions were answered to their satisfaction.  Additional ITP documentation can be found electronically including daily and monthly exercise summaries, daily session notes with ECG summaries, education notes, daily medication reconciliation, and daily physician supervision.      INITIAL EVALUATION SUMMARY DATE:  2025    Patient's subjective report of progress/symptoms: Bill is here today for his pulmonary rehabilitation evaluation after recent hospitalization for COPD exacerbation. PFTs show moderate-severe COPD. He denies shortness of breath with exertion and does not feel he is really limited with activities most of the time. He is interested in doing pulmonary rehab to increase his strength.  Home exercise/ADLs: no home exercise, no limitations with ADLs.   Clinical Comments: Bill complete 6 min walk test for his exercise assessment today and tolerated it well. No concerns at this time.    Initial Fitness Assessment: 6MWT:    Resting:  BP: 122/70  HR: 110  SpO2: 91  Dyspnea: 0  Exercise:  BP: 138/72  HR: 128  SpO2: 92%  Dyspnea: 4  Distance walked:  1200ft  METs:  2.74  ECG Summary: ST at rest      ASSESSMENT      Medical History:   Past Medical History[1]    Family History:  Family History[2]    Allergies:   Bevespi aerosphere [glycopyrrolate-formoterol], Bupropion, Oxcarbazepine, Albuterol, and Fluoxetine    Current  Medications:   Current Medications[3]    Physical Limitations: shoulder/neck pain-limitations    Fall Risk: Low   Comments: Ambulates with a steady gait with no assist device    Cultural needs: none    PFT:  Yes     FEV1/FVC ratio of 49%   FEV1 of 53% predicted  moderate obstruction.    Medication compliance:  Comments: Pt reports to be compliant with medications      Pulmonary Disease Risk Factors:  second hand smoke  smoking    Sleep Disorders:   none      EXERCISE ASSESSMENT:      Current Functional Status:  Occupation: unemployed  Recreation/Physical activity: none  ADL’s:No limitations  Carmel: No limitations  Exercise:  none  Other Comments: n/a    SMART Exercise Goals:   improved 6MWT distance by 100-165 ft (30-50m), reduced RPD during exercise, improved peak METs by 0.5 to 1.0 tolerated in pulmonary rehab exercise session, and SpO2 >88% during exercise    Patient Specific EXERCISE GOALS:     increased muscular strength, increased energy, and increased stamina with ADLs    PSYCHOSOCIAL ASSESSMENT:    Date of last assessment: 6/26/2025  Depression screening:  PHQ-9 = 0    Interpretation:  0 =No Depression  Anxiety screening:  DAVIS-7 = 0    Interpretation: 0-4  = Not anxious    Pt self-report of depression and anxiety   Patient reports they are coping well with good social support and denies depression or anxiety  Reports sufficient emotional support     Self-reported stress level:  3  Stress Management: practice relaxation techniques  keep a positive mindset    Quality of Life Screen:  (Higher score indicates disease impact on QOL)  [place] COOP score: 20/40      CAT: 6/40      Shortness of breath questionnaire: 6/120    Social Support:   siblings  Community/Social Activities: none    SMART Goals:    Feelings in [facility] Score < 3  Pain in [facility] Score < 3    Patient Specific PSYCHOCOSOCIAL GOALS:    maintain compliance with medical therapy, continue to have regular visits with the therapist, and  "spend time with family     Psychosocial Assessment as it relates to rehabilitation: Patient denies issues with his/her family or home life that may affect their rehabilitation efforts.       NUTRITION ASSESSMENT:    Initial Weight:  107.8 lb  Current Weight:     Height:   Ht Readings from Last 1 Encounters:   06/20/25 5' 3\" (1.6 m)       Self-reported Current Dietary Habits:  Does not follow a specific eating plan.    ETOH:   Social History     Substance and Sexual Activity   Alcohol Use Yes    Comment: rarely       SMART Goals:   No goals    Patient Specific NUTRITION GOALS:    No goals at this time      OTHER CORE COMPONENT ASSESSMENT:    Hospitalizations in the past year: none    Oxygen needs:   Rest:  room air  Exercise/physical activity:  room air  Sleep:   room air    Does Pt monitor home SpO2? yes   Average SpO2 at rest:  90-92%   Average SpO2 with ADLs/physical activity:  89-91%      Use of Rescue Inhaler: Yes:  3-4 times per day    Use of Maintenance Inhaler: Yes:  1 times per day    Use of Nebulizer Treatments:  rarely uses    Patient practices breathing techniques at home:  No and Reviewed with patient on:  6/26/2025    CAD Risk Factors:  Cholesterol: No  HTN: No  DM: No  Obesity: No     Smoking: Recent user, quit in last 6 months, doing well abstaining    SMART Core Component Goals:   abstain from smoking and demonstrate pursed lipped breathing    Patient Specific CORE COMPONENT GOALS:    medication compliance, Abstain from smoking, and monitor home pulse oximetry      INDIVIDUALIZED TREATMENT PLAN      EXERCISE GOALS AND PLAN    PHYSCIAN PRESCRIBED EXERCISE    Current Aerobic Exercise Prescription       Frequency: 2 days/week   Supplement with home exercise 2+ days/wk as tolerated        Minutes: 20-40         METS: 2.0-3.0              SpO2: >88%              RPD: 4-6                      HR: RHR +30-40bpm   RPE: 4-5         Modalities: Treadmill, UBE, Lifecycle, NuStep, and Recumbent bike      Aerobic " Exercise Prescription Plan for Progression:    Frequency: 2 days/week    Supplement with home exercise 2+ days/wk as tolerated       Minutes: 30-40        METS: 2.5-3.0              SpO2: >88%              RPD: 4-6                      HR:RHR +30-40bpm   RPE: 4-5        Modalities: Treadmill, UBE, Lifecycle, NuStep, and Recumbent bike        Supplemental Oxygen Needs with Exercise:  room air.    Strength trainin-3 days / week  12-15 repetitions  1-2 sets per modality   Will be added following 2-3 weeks of monitored exercise sessions   Modalities: Pull Downs, Lateral Raise, Arm Extension, Arm Curl, Sit to Stands, and Front Raises    Education: pursed lip breathing, diaphragmatic breathing, relaxation breathing, home exercise guidelines, benefits of exercise for pulmonary disease, RPE scale, RPD scale, O2 saturation monitoring, appropriate O2 response to exercise, and energy conservation    Progress toward Exercise Goals:    Reviewed Pt goals and determined plan of care    Exercise Plan:  patient will attend rehab 2-3 times per week to complete 24 exercise sessions, learn to conserve energy with ADLs , reduce time sitting at home, begin a home exercise program , regular attendance in pulmonary rehab, and begin resistance training in rehab session    Readiness to change: Preparation:  (Getting ready to change)       NUTRITION GOALS AND PLAN    Progress toward Nutritional goals:    Reviewed Pt goals and determined plan of care    Nutrition Plan: group class: Reading Food Labels  group Class: Heart Healthy Eating    SMART goals are based Rate Your Plate Dietary Self-Assessment. These are the areas in which the patient could score higher on the assessment.  Goals include recommendations for a heart healthy diet based on American Heart Association.    Nutrition Education: maintaining hydration    Readiness to change: Preparation:  (Getting ready to change)       PSYCHOSOCIAL GOALS AND PLAN    Information to begin  using [employer] was provided as well as contact information for counseling through  [facility].    Progress toward Psychosocial goals:    Reviewed Pt goals and determined plan of care    Psychosocial Plan: Class: Relaxation, Class:  Stress and Pulmonary Disease, and practice relaxation techniques    Psychosocial Education: signs and symptoms of depression  benefits of a positive support system  stress management techniques    Readiness to change: Action:  (Changing behavior)      OTHER CORE COMPONENTS GOALS AND PLAN    Tobacco Use Intervention:     Pt quit 3/2025   and has abstained    Oxygen Goal: Maintain SpO2>88% during exercise or as advised by pulmonolgist    Progress toward Core Component goals:    Reviewed Pt goals and determined plan of care    Core Component Plan: avoid processed foods  engage in regular exercise for BP control  abstain from smoking    Core Component Education:  pathophysiology of pulmonary disease, preventing infections , dangers of smoking, tobacco triggers, smoking relapse education, control coughing, environmental triggers, proper nebulizer use, proper bronchodilator use, airway clearance techniques for bronchial secretions, and pulmonary devices    Readiness to change: Preparation:  (Getting ready to change)          [1]   Past Medical History:  Diagnosis Date    Asthma     Bipolar 1 disorder (HCC)     COPD (chronic obstructive pulmonary disease) (HCC)     Emphysema lung (HCC)     Hypertension    [2]   Family History  Problem Relation Name Age of Onset    Depression Brother     [3]   Current Outpatient Medications   Medication Sig Dispense Refill    alendronate (FOSAMAX) 70 mg tablet Take 70 mg by mouth once a week      amitriptyline (ELAVIL) 50 mg tablet Amitriptyline 50m tablet po at night PRN 90 tablet 1    Bevespi Aerosphere 9-4.8 MCG/ACT inhaler Inhale 2 puffs in the morning and 2 puffs in the evening.      Cholecalciferol 50 MCG (2000 UT) TABS Take 2,000 Units by  mouth daily      DULoxetine (CYMBALTA) 60 mg delayed release capsule Take 1 capsule (60 mg total) by mouth 2 (two) times a day Duloxetine 60m capsule po twice daily 180 capsule 1    fluticasone (FLONASE) 50 mcg/act nasal spray 2 sprays      guaiFENesin (MUCINEX) 600 mg 12 hr tablet Take 1 tablet (600 mg total) by mouth 2 (two) times a day 60 tablet 1    levalbuterol (Xopenex HFA) 45 mcg/act inhaler Inhale 1-2 puffs every 4 (four) hours as needed for wheezing 15 g 0    loratadine (CLARITIN) 10 mg tablet Take 10 mg by mouth daily as needed      omeprazole (PriLOSEC) 20 mg delayed release capsule Take 1 capsule by mouth in the morning      pantoprazole (PROTONIX) 40 mg tablet Take 1 tablet (40 mg total) by mouth daily (Patient not taking: Reported on 2025) 30 tablet 0    risperiDONE (RisperDAL) 1 mg tablet Risperidone 1 mg : 1 tablet in the morning 90 tablet 1    risperiDONE (RisperDAL) 4 mg tablet Risperidone 4m tablet po night 90 tablet 1    Stelara 45 MG/0.5ML SOSY subcutaneous injection        No current facility-administered medications for this visit.

## 2025-07-01 ENCOUNTER — APPOINTMENT (OUTPATIENT)
Dept: PULMONOLOGY | Facility: HOSPITAL | Age: 60
End: 2025-07-01
Payer: MEDICARE

## 2025-07-03 ENCOUNTER — PATIENT OUTREACH (OUTPATIENT)
Dept: CASE MANAGEMENT | Facility: OTHER | Age: 60
End: 2025-07-03

## 2025-07-03 ENCOUNTER — CLINICAL SUPPORT (OUTPATIENT)
Dept: PULMONOLOGY | Facility: HOSPITAL | Age: 60
End: 2025-07-03
Payer: MEDICARE

## 2025-07-03 DIAGNOSIS — J44.1 COPD WITH ACUTE EXACERBATION (HCC): Primary | ICD-10-CM

## 2025-07-03 PROCEDURE — 94625 PHY/QHP OP PULM RHB W/O MNTR: CPT

## 2025-07-03 NOTE — PROGRESS NOTES
.OP RT CM called and spoke with patient regarding his breathing, medications and O2.    Bill stated he is feeling well. He is attending pulmonary rehab regularly and denies fever/chills and is raising clear sputum.   Bill stated his SpO2 on room air is 92% and does not use O2 nor at pulm.rehab.   Bill stated pulm. MD office will call later in the year to schedule next visit.   He received the COPD booklet/zone tools and my business card however would like my contact information also sent thru [employer]. Will do.   Bill is using Bevespi BID and rinsing out mouth and Xopenex rescue inhaler BID.     Bill is agreeable for another outreach in three week. OP RT CM will outreach once more in three weeks and he has my contact information.

## 2025-07-07 DIAGNOSIS — J44.1 COPD WITH ACUTE EXACERBATION (HCC): ICD-10-CM

## 2025-07-07 NOTE — TELEPHONE ENCOUNTER
Reason for call:   [x] Refill   [] Prior Auth  [] Other:     Office:   [] PCP/Provider -   [x] Specialty/Provider - pulmo    Medication: levalbuterol (Xopenex HFA) 45 mcg/act inhaler Inhale 1-2 puffs every 4 (four) hours as needed for wheezing,       Pharmacy: [facility]/pharmacy #[id number] - [place] - [address] - [facility]   Does the patient have enough for 3 days?   [x] Yes   [] No - Send as HP to POD    Mail Away Pharmacy   Does the patient have enough for 10 days?   [] Yes   [] No - Send as HP to POD

## 2025-07-08 ENCOUNTER — CLINICAL SUPPORT (OUTPATIENT)
Dept: PULMONOLOGY | Facility: HOSPITAL | Age: 60
End: 2025-07-08
Payer: MEDICARE

## 2025-07-08 DIAGNOSIS — J44.1 COPD WITH ACUTE EXACERBATION (HCC): Primary | ICD-10-CM

## 2025-07-08 PROCEDURE — 94625 PHY/QHP OP PULM RHB W/O MNTR: CPT

## 2025-07-08 RX ORDER — LEVALBUTEROL TARTRATE 45 UG/1
1-2 AEROSOL, METERED ORAL EVERY 4 HOURS PRN
Qty: 15 G | Refills: 5 | Status: SHIPPED | OUTPATIENT
Start: 2025-07-08

## 2025-07-10 ENCOUNTER — TELEPHONE (OUTPATIENT)
Age: 60
End: 2025-07-10

## 2025-07-10 ENCOUNTER — CLINICAL SUPPORT (OUTPATIENT)
Dept: PULMONOLOGY | Facility: HOSPITAL | Age: 60
End: 2025-07-10
Payer: MEDICARE

## 2025-07-10 DIAGNOSIS — J44.1 COPD WITH ACUTE EXACERBATION (HCC): Primary | ICD-10-CM

## 2025-07-10 LAB
DME PARACHUTE DELIVERY DATE REQUESTED: NORMAL
DME PARACHUTE ITEM DESCRIPTION: NORMAL
DME PARACHUTE ORDER STATUS: NORMAL
DME PARACHUTE SUPPLIER NAME: NORMAL
DME PARACHUTE SUPPLIER PHONE: NORMAL

## 2025-07-10 PROCEDURE — 94625 PHY/QHP OP PULM RHB W/O MNTR: CPT

## 2025-07-10 NOTE — TELEPHONE ENCOUNTER
Needs letter for DME company that he ne longer needs the O2 tanks and that he wants them to come to the home and take them back.

## 2025-07-10 NOTE — TELEPHONE ENCOUNTER
I called and spoke with Bill. He would like all of his oxygen equipment returned to [facility]. He received this equipment from [facility] pulmonology at some point. He recently transitioned his care to our office from them after hospitalization.  Formal oxygen qualification prior to discharge from the hospital did not reveal any need for supplemental oxygen and at his last visit it was commented in the notes that he was not wearing supplemental oxygen.  Order placed to discontinue oxygen supplies.

## 2025-07-10 NOTE — TELEPHONE ENCOUNTER
Patient called regarding the discontinuation of oxygen services. He stated that [facility] informed him they received an order to deliver oxygen, not to remove it.  Please contact [facility] to clarify the order. Thank you.

## 2025-07-11 NOTE — TELEPHONE ENCOUNTER
Called [facility] and spoke to their Oxygen Department rep, Dominic, and verified the Oxygen Discontinuation. They do have the order and are processing it. Dominic stated there is no record of an Oxygen Delivery being/to be made as it has been marked for discontinuation and . States Marjan reached out to Pt this morning and LM on  as well in an attempt to schedule a p/u for O2. Pt needs to speak with them to schedule the p/u.    Called Bill and VESTA on , stating the O2 is discontinued and [facility] is attempting to contact him to schedule a p/u. Left their CB number [phone] (Pt Support) and instructed them to call them to get O2 retrieved.   [Time Spent: ___ minutes] : I have spent [unfilled] minutes of time on the encounter which excludes teaching and separately reported services.

## 2025-07-15 ENCOUNTER — OFFICE VISIT (OUTPATIENT)
Dept: PSYCHIATRY | Facility: CLINIC | Age: 60
End: 2025-07-15
Payer: COMMERCIAL

## 2025-07-15 ENCOUNTER — APPOINTMENT (OUTPATIENT)
Dept: PULMONOLOGY | Facility: HOSPITAL | Age: 60
End: 2025-07-15
Payer: MEDICARE

## 2025-07-15 DIAGNOSIS — F20.9 SCHIZOPHRENIA, UNSPECIFIED TYPE (HCC): Primary | ICD-10-CM

## 2025-07-15 DIAGNOSIS — F41.1 GENERALIZED ANXIETY DISORDER: ICD-10-CM

## 2025-07-15 PROCEDURE — 99214 OFFICE O/P EST MOD 30 MIN: CPT

## 2025-07-17 ENCOUNTER — CLINICAL SUPPORT (OUTPATIENT)
Dept: PULMONOLOGY | Facility: HOSPITAL | Age: 60
End: 2025-07-17
Payer: MEDICARE

## 2025-07-17 DIAGNOSIS — J44.1 COPD WITH ACUTE EXACERBATION (HCC): Primary | ICD-10-CM

## 2025-07-17 PROCEDURE — 94625 PHY/QHP OP PULM RHB W/O MNTR: CPT

## 2025-07-21 ENCOUNTER — SOCIAL WORK (OUTPATIENT)
Dept: BEHAVIORAL/MENTAL HEALTH CLINIC | Facility: CLINIC | Age: 60
End: 2025-07-21
Payer: COMMERCIAL

## 2025-07-21 DIAGNOSIS — F20.9 SCHIZOPHRENIA, UNSPECIFIED TYPE (HCC): ICD-10-CM

## 2025-07-21 DIAGNOSIS — F32.A DEPRESSION, UNSPECIFIED DEPRESSION TYPE: ICD-10-CM

## 2025-07-21 DIAGNOSIS — F41.1 GENERALIZED ANXIETY DISORDER: Primary | ICD-10-CM

## 2025-07-21 PROCEDURE — 90837 PSYTX W PT 60 MINUTES: CPT | Performed by: COUNSELOR

## 2025-07-22 ENCOUNTER — CLINICAL SUPPORT (OUTPATIENT)
Dept: PULMONOLOGY | Facility: HOSPITAL | Age: 60
End: 2025-07-22
Payer: MEDICARE

## 2025-07-22 DIAGNOSIS — J44.1 COPD WITH ACUTE EXACERBATION (HCC): Primary | ICD-10-CM

## 2025-07-22 PROCEDURE — 94625 PHY/QHP OP PULM RHB W/O MNTR: CPT

## 2025-07-23 ENCOUNTER — PATIENT OUTREACH (OUTPATIENT)
Dept: CASE MANAGEMENT | Facility: OTHER | Age: 60
End: 2025-07-23

## 2025-07-23 NOTE — PROGRESS NOTES
OP RT CM spoke with Bill today for weekly check in and 30 day review. Pt states he is breathing better then ever. Pt is participating in pulmonary rehab. He has received COPD education. Pt states he has all his medication and understands how to take it. Pt oxygen has been discontinued. Pt denies cough fever and chills. Pt states his oxygen is 96% on room air. Pt declines the need for financial assistance for affording medication. At this point OP RT CM feels it is appropriate to close Pt careplan. Pt is aware he can contact OP RT CM anytime with questions and concerns and that we will not be making check in phonecalls going forward. Pt has OP RT CM contact info.

## 2025-07-24 ENCOUNTER — CLINICAL SUPPORT (OUTPATIENT)
Dept: PULMONOLOGY | Facility: HOSPITAL | Age: 60
End: 2025-07-24
Payer: MEDICARE

## 2025-07-24 DIAGNOSIS — J44.1 COPD WITH ACUTE EXACERBATION (HCC): Primary | ICD-10-CM

## 2025-07-24 PROCEDURE — 94625 PHY/QHP OP PULM RHB W/O MNTR: CPT

## 2025-07-24 NOTE — PROGRESS NOTES
Pulmonary Rehabilitation   Assessment and Individualized Treatment Plan  30 DAY REASSESSMENT      Today's date: 2025  # of Exercise Sessions Completed: 7  Patient name: Bill Haji     : 1965       MRN: [id number]  Referring Physician: Dr. Haile  Pulmonologist: Alice Lambert  Provider: Main  Clinician: Whitley Sanford MS, CEP      Dx:    Encounter Diagnosis   Name Primary?    COPD with acute exacerbation (HCC) Yes     Date of onset: 6/10/2025      Treatment is tailored to this patient's individual needs.  The ITP was reviewed with the patient and all questions were answered to their satisfaction.  Additional ITP documentation can be found electronically including daily and monthly exercise summaries, daily session notes with ECG summaries, education notes, daily medication reconciliation, and daily physician supervision.      REASSESSMENT SUMMARY   DATE:  2025    See ITP below for further details including patient goals and plan of care for progression.      Resting BP  106/68 - 122/68,  HR 83 - 110  Exercise HR 94 - 122  Exercise session details:  30-35 minutes,  1.6 - 2.3 METs  LO2:   Rest:   room air, Exercise:   room air  SpO2: Rest:  89-96 %,  Exercise:  89-95 %  Dyspnea:   Rest:  0/10,  Exercise: 0-2/10  Home exercise/ADLs: no limitations with ADLs, no formal home exercise  Patient's subjective report of progress: Bill reports feeling improvement over the past 30 days. He reports increased strength and endurance.   Clinical Comments: Bill has been progressing appropriately in rehab increasing exercise durations and intensities as tolerated. No to minimal SOB noted with exercise.     Initial Fitness Assessment: 6MWT:    Resting:  BP: 122/70  HR: 110  SpO2: 91  Dyspnea: 0  Exercise:  BP: 138/72  HR: 128  SpO2: 92%  Dyspnea: 4  Distance walked:  1200ft  METs:  2.74  ECG Summary: ST at rest      ASSESSMENT      Medical History:   Past Medical History[1]    Family  History:  Family History[2]    Allergies:   Bevespi aerosphere [glycopyrrolate-formoterol], Bupropion, Oxcarbazepine, Albuterol, and Fluoxetine    Current Medications:   Current Medications[3]    Physical Limitations: shoulder/neck pain-limitations    Fall Risk: Low   Comments: Ambulates with a steady gait with no assist device    Cultural needs: none    PFT:  Yes     FEV1/FVC ratio of 49%   FEV1 of 53% predicted  moderate obstruction.    Medication compliance:  Comments: Pt reports to be compliant with medications      Pulmonary Disease Risk Factors:  second hand smoke  smoking    Sleep Disorders:   none      EXERCISE ASSESSMENT:      Current Functional Status:  Occupation: unemployed  Recreation/Physical activity: none  ADL’s:No limitations  [place]: No limitations  Exercise:  none  Other Comments: n/a    SMART Exercise Goals:   improved 6MWT distance by 100-165 ft (30-50m), reduced RPD during exercise, improved peak METs by 0.5 to 1.0 tolerated in pulmonary rehab exercise session, and SpO2 >88% during exercise    Patient Specific EXERCISE GOALS:     increased muscular strength, increased energy, and increased stamina with ADLs    PSYCHOSOCIAL ASSESSMENT:    Date of last assessment: 6/26/2025  Depression screening:  PHQ-9 = 0    Interpretation:  0 =No Depression  Anxiety screening:  DAVIS-7 = 0    Interpretation: 0-4  = Not anxious    Pt self-report of depression and anxiety   Patient reports they are coping well with good social support and denies depression or anxiety  Reports sufficient emotional support     Self-reported stress level:  3  Stress Management: practice relaxation techniques  keep a positive mindset    Quality of Life Screen:  (Higher score indicates disease impact on QOL)  [facility] COOP score: 20/40      CAT: 6/40      Shortness of breath questionnaire: 6/120    Social Support:   siblings  Community/Social Activities: none    SMART Goals:    Feelings in [facility] Score < 3  Pain in [facility]  "Score < 3    Patient Specific PSYCHOCOSOCIAL GOALS:    maintain compliance with medical therapy, continue to have regular visits with the therapist, and spend time with family     Psychosocial Assessment as it relates to rehabilitation: Patient denies issues with his/her family or home life that may affect their rehabilitation efforts.       NUTRITION ASSESSMENT:    Initial Weight:  107.8 lb  Current Weight: 109.8 lbs    Height:   Ht Readings from Last 1 Encounters:   06/20/25 5' 3\" (1.6 m)       Self-reported Current Dietary Habits:  Does not follow a specific eating plan.    ETOH:   Social History     Substance and Sexual Activity   Alcohol Use Yes    Comment: rarely       SMART Goals:   No goals    Patient Specific NUTRITION GOALS:    No goals at this time      OTHER CORE COMPONENT ASSESSMENT:    Hospitalizations in the past year: none    Oxygen needs:   Rest:  room air  Exercise/physical activity:  room air  Sleep:   room air    Does Pt monitor home SpO2? yes   Average SpO2 at rest:  90-92%   Average SpO2 with ADLs/physical activity:  89-91%      Use of Rescue Inhaler: Yes:  3-4 times per day    Use of Maintenance Inhaler: Yes:  1 times per day    Use of Nebulizer Treatments:  rarely uses    Patient practices breathing techniques at home:  No and Reviewed with patient on:  6/26/2025    CAD Risk Factors:  Cholesterol: No  HTN: No  DM: No  Obesity: No     Smoking: Recent user, quit in last 6 months, doing well abstaining    SMART Core Component Goals:   abstain from smoking and demonstrate pursed lipped breathing    Patient Specific CORE COMPONENT GOALS:    medication compliance, Abstain from smoking, and monitor home pulse oximetry      INDIVIDUALIZED TREATMENT PLAN      EXERCISE GOALS AND PLAN    PHYSCIAN PRESCRIBED EXERCISE    Current Aerobic Exercise Prescription       Frequency: 2 days/week   Supplement with home exercise 2+ days/wk as tolerated        Minutes: 30-35         METS: 1.6-2.3              SpO2: " >88%              RPD: 4-6                      HR: RHR +30-40bpm   RPE: 4-5         Modalities: Treadmill, UBE, Lifecycle, NuStep, and Recumbent bike      Aerobic Exercise Prescription Plan for Progression:    Frequency: 2 days/week    Supplement with home exercise 2+ days/wk as tolerated       Minutes: 35-45        METS: 2.2-2.8              SpO2: >88%              RPD: 4-6                      HR:RHR +30-40bpm   RPE: 4-5        Modalities: Treadmill, UBE, Lifecycle, NuStep, and Recumbent bike        Supplemental Oxygen Needs with Exercise:  room air.    Strength trainin-3 days / week  12-15 repetitions  1-2 sets per modality   Will be added following 2-3 weeks of monitored exercise sessions   Modalities: Pull Downs, Lateral Raise, Arm Extension, Arm Curl, Sit to Stands, and Front Raises    Education: pursed lip breathing, diaphragmatic breathing, relaxation breathing, home exercise guidelines, benefits of exercise for pulmonary disease, RPE scale, RPD scale, O2 saturation monitoring, appropriate O2 response to exercise, and energy conservation    Progress toward Exercise Goals:    Pt is progressing and showing improvement  toward the following goals:  reduced RPD during exercise, improving peak METs in rehab, SpO2 >88% with exercise, increased muscular strength, increased energy, increased stamina with ADLs.  , Will continue to educate and progress as tolerated.    Exercise Plan:  patient will attend rehab 2-3 times per week to complete 24 exercise sessions, learn to conserve energy with ADLs , reduce time sitting at home, begin a home exercise program , regular attendance in pulmonary rehab, and begin resistance training in rehab session    Readiness to change: Action:  (Changing behavior)      NUTRITION GOALS AND PLAN    Progress toward Nutritional goals:    Will continue to educate and progress as tolerated., no reported changes to diet    Nutrition Plan: group class: Reading Food Labels  group Class:  Heart Healthy Eating    SMART goals are based Rate Your Plate Dietary Self-Assessment. These are the areas in which the patient could score higher on the assessment.  Goals include recommendations for a heart healthy diet based on American Heart Association.    Nutrition Education: maintaining hydration  education: Heart Healthy Eating    Readiness to change: Preparation:  (Getting ready to change)       PSYCHOSOCIAL GOALS AND PLAN    Information to begin using [employer] was provided as well as contact information for counseling through  [facility].    Progress toward Psychosocial goals:    Will continue to educate and progress as tolerated., no reported changes in psychosocial status    Psychosocial Plan: Class: Relaxation, Class:  Stress and Pulmonary Disease, and practice relaxation techniques    Psychosocial Education: signs and symptoms of depression  benefits of a positive support system  stress management techniques    Readiness to change: Action:  (Changing behavior)      OTHER CORE COMPONENTS GOALS AND PLAN    Tobacco Use Intervention:     Pt quit 3/2025   and has abstained    Oxygen Goal: Maintain SpO2>88% during exercise or as advised by pulmonolgist    Progress toward Core Component goals:    Pt is progressing and showing improvement  toward the following goals:  demonstrate PLB, medication compliance, monitor home pulse ox, abstaining from smoking.  , Will continue to educate and progress as tolerated.    Core Component Plan: avoid processed foods  engage in regular exercise for BP control  abstain from smoking    Core Component Education:  pathophysiology of pulmonary disease, preventing infections , dangers of smoking, tobacco triggers, smoking relapse education, control coughing, environmental triggers, proper nebulizer use, proper bronchodilator use, airway clearance techniques for bronchial secretions, and pulmonary devices    Readiness to change: Action:  (Changing behavior)          [1]   Past Medical History:  Diagnosis Date    Asthma     Bipolar 1 disorder (HCC)     COPD (chronic obstructive pulmonary disease) (HCC)     Emphysema lung (HCC)     Hypertension    [2]   Family History  Problem Relation Name Age of Onset    Depression Brother     [3]   Current Outpatient Medications   Medication Sig Dispense Refill    alendronate (FOSAMAX) 70 mg tablet Take 70 mg by mouth once a week      amitriptyline (ELAVIL) 50 mg tablet Amitriptyline 50m tablet po at night PRN 90 tablet 1    Bevespi Aerosphere 9-4.8 MCG/ACT inhaler Inhale 2 puffs in the morning and 2 puffs in the evening.      Cholecalciferol 50 MCG (2000 UT) TABS Take 2,000 Units by mouth daily      DULoxetine (CYMBALTA) 60 mg delayed release capsule Take 1 capsule (60 mg total) by mouth 2 (two) times a day Duloxetine 60m capsule po twice daily 180 capsule 1    fluticasone (FLONASE) 50 mcg/act nasal spray 2 sprays      guaiFENesin (MUCINEX) 600 mg 12 hr tablet Take 1 tablet (600 mg total) by mouth 2 (two) times a day 60 tablet 1    levalbuterol (Xopenex HFA) 45 mcg/act inhaler Inhale 1-2 puffs every 4 (four) hours as needed for wheezing 15 g 5    loratadine (CLARITIN) 10 mg tablet Take 10 mg by mouth daily as needed      omeprazole (PriLOSEC) 20 mg delayed release capsule Take 1 capsule by mouth in the morning      pantoprazole (PROTONIX) 40 mg tablet Take 1 tablet (40 mg total) by mouth daily (Patient not taking: Reported on 2025) 30 tablet 0    risperiDONE (RisperDAL) 1 mg tablet Risperidone 1 mg : 1 tablet in the morning 90 tablet 1    risperiDONE (RisperDAL) 4 mg tablet Risperidone 4m tablet po night 90 tablet 1    Stelara 45 MG/0.5ML SOSY subcutaneous injection        No current facility-administered medications for this visit.

## 2025-07-27 PROBLEM — F17.201 NICOTINE DEPENDENCE IN REMISSION: Status: ACTIVE | Noted: 2025-07-27

## 2025-07-29 ENCOUNTER — CLINICAL SUPPORT (OUTPATIENT)
Dept: PULMONOLOGY | Facility: HOSPITAL | Age: 60
End: 2025-07-29
Payer: MEDICARE

## 2025-07-29 DIAGNOSIS — J44.1 COPD WITH ACUTE EXACERBATION (HCC): Primary | ICD-10-CM

## 2025-07-29 PROCEDURE — 94625 PHY/QHP OP PULM RHB W/O MNTR: CPT

## 2025-07-31 ENCOUNTER — CLINICAL SUPPORT (OUTPATIENT)
Dept: PULMONOLOGY | Facility: HOSPITAL | Age: 60
End: 2025-07-31
Payer: MEDICARE

## 2025-07-31 DIAGNOSIS — J44.1 COPD WITH ACUTE EXACERBATION (HCC): Primary | ICD-10-CM

## 2025-07-31 PROCEDURE — 94625 PHY/QHP OP PULM RHB W/O MNTR: CPT

## 2025-08-01 ENCOUNTER — VBI (OUTPATIENT)
Dept: ADMINISTRATIVE | Facility: OTHER | Age: 60
End: 2025-08-01

## 2025-08-02 PROBLEM — R91.1 PULMONARY NODULE: Status: ACTIVE | Noted: 2025-08-02

## 2025-08-02 PROBLEM — E87.1 CHRONIC HYPONATREMIA: Status: ACTIVE | Noted: 2025-08-02

## 2025-08-02 PROBLEM — N52.9 ERECTILE DYSFUNCTION: Status: ACTIVE | Noted: 2025-08-02

## 2025-08-02 PROBLEM — D84.821 IMMUNOSUPPRESSION DUE TO DRUG THERAPY  (HCC): Status: ACTIVE | Noted: 2025-08-02

## 2025-08-02 PROBLEM — E78.00 HYPERCHOLESTEROLEMIA: Status: ACTIVE | Noted: 2025-08-02

## 2025-08-02 PROBLEM — E46 PROTEIN CALORIE MALNUTRITION (HCC): Status: ACTIVE | Noted: 2025-08-02

## 2025-08-02 PROBLEM — Z86.0100 HISTORY OF COLONIC POLYPS: Status: ACTIVE | Noted: 2025-08-02

## 2025-08-02 PROBLEM — Z79.899 IMMUNOSUPPRESSION DUE TO DRUG THERAPY  (HCC): Status: ACTIVE | Noted: 2025-08-02

## 2025-08-02 PROBLEM — G56.03 CARPAL TUNNEL SYNDROME ON BOTH SIDES: Status: ACTIVE | Noted: 2025-08-02

## 2025-08-02 PROBLEM — F31.9 BIPOLAR DISORDER (HCC): Status: ACTIVE | Noted: 2025-08-02

## 2025-08-05 ENCOUNTER — CLINICAL SUPPORT (OUTPATIENT)
Dept: PULMONOLOGY | Facility: HOSPITAL | Age: 60
End: 2025-08-05
Payer: MEDICARE

## 2025-08-05 DIAGNOSIS — J44.1 COPD WITH ACUTE EXACERBATION (HCC): Primary | ICD-10-CM

## 2025-08-05 PROCEDURE — 94625 PHY/QHP OP PULM RHB W/O MNTR: CPT

## 2025-08-12 ENCOUNTER — CLINICAL SUPPORT (OUTPATIENT)
Dept: PULMONOLOGY | Facility: HOSPITAL | Age: 60
End: 2025-08-12
Payer: MEDICARE

## 2025-08-13 ENCOUNTER — TELEPHONE (OUTPATIENT)
Dept: PSYCHIATRY | Facility: CLINIC | Age: 60
End: 2025-08-13

## 2025-08-14 ENCOUNTER — CLINICAL SUPPORT (OUTPATIENT)
Dept: PULMONOLOGY | Facility: HOSPITAL | Age: 60
End: 2025-08-14
Payer: MEDICARE

## 2025-08-21 ENCOUNTER — CLINICAL SUPPORT (OUTPATIENT)
Dept: PULMONOLOGY | Facility: HOSPITAL | Age: 60
End: 2025-08-21
Payer: MEDICARE

## 2025-08-21 DIAGNOSIS — J44.1 COPD WITH ACUTE EXACERBATION (HCC): Primary | ICD-10-CM

## 2025-08-21 PROCEDURE — 94625 PHY/QHP OP PULM RHB W/O MNTR: CPT
